# Patient Record
Sex: MALE | Race: WHITE | NOT HISPANIC OR LATINO | Employment: FULL TIME | URBAN - NONMETROPOLITAN AREA
[De-identification: names, ages, dates, MRNs, and addresses within clinical notes are randomized per-mention and may not be internally consistent; named-entity substitution may affect disease eponyms.]

---

## 2022-01-18 ENCOUNTER — OFFICE VISIT (OUTPATIENT)
Dept: FAMILY MEDICINE CLINIC | Facility: CLINIC | Age: 47
End: 2022-01-18

## 2022-01-18 VITALS
DIASTOLIC BLOOD PRESSURE: 75 MMHG | HEIGHT: 60 IN | OXYGEN SATURATION: 99 % | BODY MASS INDEX: 43.27 KG/M2 | RESPIRATION RATE: 18 BRPM | TEMPERATURE: 97.3 F | HEART RATE: 79 BPM | SYSTOLIC BLOOD PRESSURE: 119 MMHG | WEIGHT: 220.4 LBS

## 2022-01-18 DIAGNOSIS — Z00.00 ROUTINE GENERAL MEDICAL EXAMINATION AT A HEALTH CARE FACILITY: Primary | ICD-10-CM

## 2022-01-18 DIAGNOSIS — J45.30 MILD PERSISTENT REACTIVE AIRWAY DISEASE WITHOUT COMPLICATION: ICD-10-CM

## 2022-01-18 PROCEDURE — 99386 PREV VISIT NEW AGE 40-64: CPT | Performed by: FAMILY MEDICINE

## 2022-01-18 RX ORDER — ALBUTEROL SULFATE 90 UG/1
2 AEROSOL, METERED RESPIRATORY (INHALATION) EVERY 4 HOURS PRN
Qty: 18 G | Refills: 2 | Status: SHIPPED | OUTPATIENT
Start: 2022-01-18 | End: 2022-06-09

## 2022-01-18 RX ORDER — TRIAMCINOLONE ACETONIDE 1 MG/G
CREAM TOPICAL DAILY PRN
COMMUNITY
Start: 2021-12-23 | End: 2022-12-14

## 2022-01-18 RX ORDER — MONTELUKAST SODIUM 10 MG/1
10 TABLET ORAL NIGHTLY
Qty: 30 TABLET | Refills: 2 | Status: SHIPPED | OUTPATIENT
Start: 2022-01-18 | End: 2022-06-09

## 2022-01-18 NOTE — PATIENT INSTRUCTIONS
Preventive Care 21-39 Years Old, Male  Preventive care refers to lifestyle choices and visits with your health care provider that can promote health and wellness. This includes:  · A yearly physical exam. This is also called an annual wellness visit.  · Regular dental and eye exams.  · Immunizations.  · Screening for certain conditions.  · Healthy lifestyle choices, such as:  ? Eating a healthy diet.  ? Getting regular exercise.  ? Not using drugs or products that contain nicotine and tobacco.  ? Limiting alcohol use.  What can I expect for my preventive care visit?  Physical exam  Your health care provider may check your:  · Height and weight. These may be used to calculate your BMI (body mass index). BMI is a measurement that tells if you are at a healthy weight.  · Heart rate and blood pressure.  · Body temperature.  · Skin for abnormal spots.  Counseling  Your health care provider may ask you questions about your:  · Past medical problems.  · Family's medical history.  · Alcohol, tobacco, and drug use.  · Emotional well-being.  · Home life and relationship well-being.  · Sexual activity.  · Diet, exercise, and sleep habits.  · Work and work environment.  · Access to firearms.  What immunizations do I need?    Vaccines are usually given at various ages, according to a schedule. Your health care provider will recommend vaccines for you based on your age, medical history, and lifestyle or other factors, such as travel or where you work.  What tests do I need?  Blood tests  · Lipid and cholesterol levels. These may be checked every 5 years starting at age 20.  · Hepatitis C test.  · Hepatitis B test.  Screening    · Diabetes screening. This is done by checking your blood sugar (glucose) after you have not eaten for a while (fasting).  · Genital exam to check for testicular cancer or hernias.  · STD (sexually transmitted disease) testing, if you are at risk.  Talk with your health care provider about your test  results, treatment options, and if necessary, the need for more tests.  Follow these instructions at home:  Eating and drinking    · Eat a healthy diet that includes fresh fruits and vegetables, whole grains, lean protein, and low-fat dairy products.  · Drink enough fluid to keep your urine pale yellow.  · Take vitamin and mineral supplements as recommended by your health care provider.  · Do not drink alcohol if your health care provider tells you not to drink.  · If you drink alcohol:  ? Limit how much you have to 0-2 drinks a day.  ? Be aware of how much alcohol is in your drink. In the [place] one drink equals one 12 oz bottle of beer (355 mL), one 5 oz glass of wine (148 mL), or one 1½ oz glass of hard liquor (44 mL).    Lifestyle  · Take daily care of your teeth and gums. Brush your teeth every morning and night with fluoride toothpaste. Floss one time each day.  · Stay active. Exercise for at least 30 minutes 5 or more days each week.  · Do not use any products that contain nicotine or tobacco, such as cigarettes, e-cigarettes, and chewing tobacco. If you need help quitting, ask your health care provider.  · Do not use drugs.  · If you are sexually active, practice safe sex. Use a condom or other form of protection to prevent STIs (sexually transmitted infections).  · Find healthy ways to cope with stress, such as:  ? Meditation, yoga, or listening to music.  ? Journaling.  ? Talking to a trusted person.  ? Spending time with friends and family.  Safety  · Always wear your seat belt while driving or riding in a vehicle.  · Do not drive:  ? If you have been drinking alcohol. Do not ride with someone who has been drinking.  ? When you are tired or distracted.  ? While texting.  · Wear a helmet and other protective equipment during sports activities.  · If you have firearms in your house, make sure you follow all gun safety procedures.  · Seek help if you have been physically or sexually abused.  What's  next?  · Go to your health care provider once a year for an annual wellness visit.  · Ask your health care provider how often you should have your eyes and teeth checked.  · Stay up to date on all vaccines.  This information is not intended to replace advice given to you by your health care provider. Make sure you discuss any questions you have with your health care provider.  Document Revised: 09/02/2020 Document Reviewed: 12/12/2019  [employer] Patient Education © 2021 [employer]    Asthma, Adult    Asthma is a long-term (chronic) condition in which the airways get tight and narrow. The airways are the breathing passages that lead from the nose and mouth down into the lungs. A person with asthma will have times when symptoms get worse. These are called asthma attacks. They can cause coughing, whistling sounds when you breathe (wheezing), shortness of breath, and chest pain. They can make it hard to breathe. There is no cure for asthma, but medicines and lifestyle changes can help control it.  There are many things that can bring on an asthma attack or make asthma symptoms worse (triggers). Common triggers include:  · Mold.  · Dust.  · Cigarette smoke.  · Cockroaches.  · Things that can cause allergy symptoms (allergens). These include animal skin flakes (dander) and pollen from trees or grass.  · Things that pollute the air. These may include household , wood smoke, smog, or chemical odors.  · Cold air, weather changes, and wind.  · Crying or laughing hard.  · Stress.  · Certain medicines or drugs.  · Certain foods such as dried fruit, potato chips, and grape juice.  · Infections, such as a cold or the flu.  · Certain medical conditions or diseases.  · Exercise or tiring activities.  Asthma may be treated with medicines and by staying away from the things that cause asthma attacks. Types of medicines may include:  · Controller medicines. These help prevent asthma symptoms. They are usually taken every  day.  · Fast-acting reliever or rescue medicines. These quickly relieve asthma symptoms. They are used as needed and provide short-term relief.  · Allergy medicines if your attacks are brought on by allergens.  · Medicines to help control the body's defense (immune) system.  Follow these instructions at home:  Avoiding triggers in your home  · Change your heating and air conditioning filter often.  · Limit your use of fireplaces and wood stoves.  · Get rid of pests (such as roaches and mice) and their droppings.  · Throw away plants if you see mold on them.  · Clean your floors. Dust regularly. Use cleaning products that do not smell.  · Have someone vacuum when you are not home. Use a vacuum  with a HEPA filter if possible.  · Replace carpet with wood, tile, or vinyl mary alice. Carpet can trap animal skin flakes and dust.  · Use allergy-proof pillows, mattress covers, and box spring covers.  · Wash bed sheets and blankets every week in hot water. Dry them in a dryer.  · Keep your bedroom free of any triggers.  · Avoid pets and keep windows closed when things that cause allergy symptoms are in the air.  · Use blankets that are made of polyester or cotton.  · Clean bathrooms and viet with bleach. If possible, have someone repaint the walls in these rooms with mold-resistant paint. Keep out of the rooms that are being cleaned and painted.  · Wash your hands often with soap and water. If soap and water are not available, use hand .  · Do not allow anyone to smoke in your home.  General instructions  · Take over-the-counter and prescription medicines only as told by your doctor.  ? Talk with your doctor if you have questions about how or when to take your medicines.  ? Make note if you need to use your medicines more often than usual.  · Do not use any products that contain nicotine or tobacco, such as cigarettes and e-cigarettes. If you need help quitting, ask your doctor.  · Stay away from  secondhand smoke.  · Avoid doing things outdoors when allergen counts are high and when air quality is low.  · Wear a ski mask when doing outdoor activities in the winter. The mask should cover your nose and mouth. Exercise indoors on cold days if you can.  · Warm up before you exercise. Take time to cool down after exercise.  · Use a peak flow meter as told by your doctor. A peak flow meter is a tool that measures how well the lungs are working.  · Keep track of the peak flow meter's readings. Write them down.  · Follow your asthma action plan. This is a written plan for taking care of your asthma and treating your attacks.  · Make sure you get all the shots (vaccines) that your doctor recommends. Ask your doctor about a flu shot and a pneumonia shot.  · Keep all follow-up visits as told by your doctor. This is important.  Contact a doctor if:  · You have wheezing, shortness of breath, or a cough even while taking medicine to prevent attacks.  · The mucus you cough up (sputum) is thicker than usual.  · The mucus you cough up changes from clear or white to yellow, green, gray, or bloody.  · You have problems from the medicine you are taking, such as:  ? A rash.  ? Itching.  ? Swelling.  ? Trouble breathing.  · You need reliever medicines more than 2-3 times a week.  · Your peak flow reading is still at 50-79% of your personal best after following the action plan for 1 hour.  · You have a fever.  Get help right away if:  · You seem to be worse and are not responding to medicine during an asthma attack.  · You are short of breath even at rest.  · You get short of breath when doing very little activity.  · You have trouble eating, drinking, or talking.  · You have chest pain or tightness.  · You have a fast heartbeat.  · Your lips or fingernails start to turn blue.  · You are light-headed or dizzy, or you faint.  · Your peak flow is less than 50% of your personal best.  · You feel too tired to breathe  normally.  Summary  · Asthma is a long-term (chronic) condition in which the airways get tight and narrow. An asthma attack can make it hard to breathe.  · Asthma cannot be cured, but medicines and lifestyle changes can help control it.  · Make sure you understand how to avoid triggers and how and when to use your medicines.  This information is not intended to replace advice given to you by your health care provider. Make sure you discuss any questions you have with your health care provider.  Document Revised: 04/21/2021 Document Reviewed: 04/21/2021  [employer] Patient Education © 2021 [employer]

## 2022-01-18 NOTE — PROGRESS NOTES
"       New Patient History and Physical      Referring Physician: No ref. provider found    Chief Complaint:    Chief Complaint   Patient presents with   • Establish Care   • Annual Exam     William Maria is a 46-year-old male who is here today for an establishment with a new primary care provider, as well as routine annual/preventative healthcare needs.     The patient has consented to being recorded using [facility].    History of Present Illness:   The patient reports he has not seen a doctor since Dr. Tavarez who retired from [facility]. He states he has had a nagging cough since 05/2021 which comes and goes. The patient thinks its allergy related.  He reports to still running and training.    He states summer 2021 he had a eczema outbreak in which he saw Dr. Ayala at [facility] in [place]. The patient states from summer 2021 to September it was under control, but since then his skin has been real irritated. He has been using some topical medication as needed which helps. The patient reports occasionally his skin has what looks like \"chill bumps\" that gets reactive and when he goes to sleep he scratches them. He reports to having an allergic component but it unable to remember. The patient states he is not a huge dairy consumer. He reports he does not have a lot sugars to feed anything. The patient has been taking Zyrtec for 4 weeks which has helped.     The patient reports to having a health screening yearly.  He states he has lower cholesterol. The patient reports he has started eating real butter and staying away from vegetable oils. He denies any trouble with urination or bowel movements.      Subjective     Review of Systems     1. Constitutional: Negative for fever. Negative for chills, diaphoresis, fatigue and unexpected weight change.   2. HENT: No dysphagia; no changes to vision/hearing/smell/taste; no epistaxis  3. Eyes: Negative for redness and visual disturbance.   4. Respiratory: As per " "above.  5. Cardiovascular: Negative for chest pain and palpitations.   6. Gastrointestinal: Negative for abdominal distention, abdominal pain and blood in stool.   7. Endocrine: Negative for cold intolerance and heat intolerance.   8. Genitourinary: Negative for difficulty urinating, dysuria and frequency.   9. Musculoskeletal: Negative for arthralgias, back pain and myalgias.   10. Skin: As per above.  11. Neurological: Negative for syncope, weakness and headaches.   12. Hematological: Negative for adenopathy. Does not bruise/bleed easily.   13. Psychiatric/Behavioral: Negative for confusion. The patient is not nervous/anxious.     The following portions of the patient's history were reviewed and updated as appropriate: allergies, current medications, past family history, past medical history, past social history, past surgical history and problem list.    Past Medical History: History reviewed. No pertinent past medical history.    Past Surgical History:History reviewed. No pertinent surgical history.    Family History: family history includes No Known Problems in his father and mother.    Social History:  reports that he has never smoked. He has never used smokeless tobacco. He reports that he does not drink alcohol and does not use drugs.    Medications:    Current Outpatient Medications:   •  triamcinolone (KENALOG) 0.1 % cream, APPLY TO AFFECTED AREA TWO TIMES A DAY X2 WEEKS THEN JUST AS NEEDED FOR FLARES, Disp: , Rfl:   •  albuterol sulfate  (90 Base) MCG/ACT inhaler, Inhale 2 puffs Every 4 (Four) Hours As Needed for Wheezing or Shortness of Air., Disp: 18 g, Rfl: 2  •  montelukast (Singulair) 10 MG tablet, Take 1 tablet by mouth Every Night., Disp: 30 tablet, Rfl: 2    Allergies:  No Known Allergies    Objective     Physical Exam:  Vital Signs: /75   Pulse 79   Temp 97.3 °F (36.3 °C)   Resp 18   Ht (!) 15.7 cm (6.2\")   Wt 100 kg (220 lb 6.4 oz)   SpO2 99%   BMI 4031.17 kg/m²      General " Appearance: alert, oriented x 3, no acute distress.  Skin: warm and dry.  Areas of fine maculopapular rash to the upper extremities, consistent with patchy areas that demonstrate qualities of eczema.  Occasional areas of excoriation noted.  HEENT: Atraumatic.  pupils round and reactive to light and accommodation, oral mucosa pink and moist.  Nares patent without epistaxis.  External auditory canals are patent tympanic membranes intact.  Neck: supple, no JVD, trachea midline.  No thyromegaly  Lungs: CTA, unlabored breathing effort.  Heart: RRR, normal S1 and S2, no S3, no rub.  Abdomen: soft, non-tender, no palpable bladder, present bowel sounds to auscultation ×4.  No guarding or rigidity.  Extremities: no clubbing, cyanosis or edema.  Good range of motion actively and passively.  Symmetric muscle strength and development  Neuro: normal speech and mental status.  Cranial nerves II through XII intact.  No anosmia. DTR 2+; proprioception intact.  No focal motor/sensory deficits.    Advance Care Planning   ACP discussion was held with the patient during this visit. Patient does not have an advance directive, information provided.    Assessment / Plan     Assessment/Plan:   Diagnoses and all orders for this visit:    1. Routine general medical examination at a health care facility (Primary)    2. Mild persistent reactive airway disease without complication  -     montelukast (Singulair) 10 MG tablet; Take 1 tablet by mouth Every Night.  Dispense: 30 tablet; Refill: 2  -     albuterol sulfate  (90 Base) MCG/ACT inhaler; Inhale 2 puffs Every 4 (Four) Hours As Needed for Wheezing or Shortness of Air.  Dispense: 18 g; Refill: 2      I have recommended trial of once daily montelukast.  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  Also dealing with mild persistent reactive airway disease.  I am hopeful that montelukast will also aid in controlling the symptoms as well.    He is  provided a short acting bronchodilator for as needed use.  He verbalized understanding of the correct timing of this medication.  Should he develop any ill effects he is asked to notify the office immediately with either of the 2 new medications.    Vital signs demonstrate hemodynamic stability.    Discussed need for reduction in sodium/salt/caffeine intake; improve sleep habits as able; inc formal CV exercise program with goal of vigorous activity most, if not all, days of the week; goal of 250 min of sustained HR CV exercise total per week.    I have discussed age/gender specific preventative healthcare issues in detail with patient today.  I have answered all of the questions.      Discussion Summary:    I spent 30 minutes caring for William on this date of service. This time includes time spent by me in the following activities:preparing for the visit, performing a medically appropriate examination and/or evaluation , counseling and educating the patient/family/caregiver, ordering medications, tests, or procedures, documenting information in the medical record and care coordination    Discussed plan of care in detail with pt today; pt verb understanding and agrees.  Follow up:  No follow-ups on file.     Patient Instructions       Preventive Care 21-39 Years Old, Male  Preventive care refers to lifestyle choices and visits with your health care provider that can promote health and wellness. This includes:  · A yearly physical exam. This is also called an annual wellness visit.  · Regular dental and eye exams.  · Immunizations.  · Screening for certain conditions.  · Healthy lifestyle choices, such as:  ? Eating a healthy diet.  ? Getting regular exercise.  ? Not using drugs or products that contain nicotine and tobacco.  ? Limiting alcohol use.  What can I expect for my preventive care visit?  Physical exam  Your health care provider may check your:  · Height and weight. These may be used to calculate your BMI  (body mass index). BMI is a measurement that tells if you are at a healthy weight.  · Heart rate and blood pressure.  · Body temperature.  · Skin for abnormal spots.  Counseling  Your health care provider may ask you questions about your:  · Past medical problems.  · Family's medical history.  · Alcohol, tobacco, and drug use.  · Emotional well-being.  · Home life and relationship well-being.  · Sexual activity.  · Diet, exercise, and sleep habits.  · Work and work environment.  · Access to firearms.  What immunizations do I need?    Vaccines are usually given at various ages, according to a schedule. Your health care provider will recommend vaccines for you based on your age, medical history, and lifestyle or other factors, such as travel or where you work.  What tests do I need?  Blood tests  · Lipid and cholesterol levels. These may be checked every 5 years starting at age 20.  · Hepatitis C test.  · Hepatitis B test.  Screening    · Diabetes screening. This is done by checking your blood sugar (glucose) after you have not eaten for a while (fasting).  · Genital exam to check for testicular cancer or hernias.  · STD (sexually transmitted disease) testing, if you are at risk.  Talk with your health care provider about your test results, treatment options, and if necessary, the need for more tests.  Follow these instructions at home:  Eating and drinking    · Eat a healthy diet that includes fresh fruits and vegetables, whole grains, lean protein, and low-fat dairy products.  · Drink enough fluid to keep your urine pale yellow.  · Take vitamin and mineral supplements as recommended by your health care provider.  · Do not drink alcohol if your health care provider tells you not to drink.  · If you drink alcohol:  ? Limit how much you have to 0-2 drinks a day.  ? Be aware of how much alcohol is in your drink. In the [place] one drink equals one 12 oz bottle of beer (355 mL), one 5 oz glass of wine (148 mL), or one 1½  oz glass of hard liquor (44 mL).    Lifestyle  · Take daily care of your teeth and gums. Brush your teeth every morning and night with fluoride toothpaste. Floss one time each day.  · Stay active. Exercise for at least 30 minutes 5 or more days each week.  · Do not use any products that contain nicotine or tobacco, such as cigarettes, e-cigarettes, and chewing tobacco. If you need help quitting, ask your health care provider.  · Do not use drugs.  · If you are sexually active, practice safe sex. Use a condom or other form of protection to prevent STIs (sexually transmitted infections).  · Find healthy ways to cope with stress, such as:  ? Meditation, yoga, or listening to music.  ? Journaling.  ? Talking to a trusted person.  ? Spending time with friends and family.  Safety  · Always wear your seat belt while driving or riding in a vehicle.  · Do not drive:  ? If you have been drinking alcohol. Do not ride with someone who has been drinking.  ? When you are tired or distracted.  ? While texting.  · Wear a helmet and other protective equipment during sports activities.  · If you have firearms in your house, make sure you follow all gun safety procedures.  · Seek help if you have been physically or sexually abused.  What's next?  · Go to your health care provider once a year for an annual wellness visit.  · Ask your health care provider how often you should have your eyes and teeth checked.  · Stay up to date on all vaccines.  This information is not intended to replace advice given to you by your health care provider. Make sure you discuss any questions you have with your health care provider.  Document Revised: 09/02/2020 Document Reviewed: 12/12/2019  [email or web address] Patient Education © 2021 [employer]        Alex Flores DO  01/18/22  16:28 EST      Transcribed from ambient dictation for Alex Flores DO by Shayy Sultana.  01/18/22   18:20 EST    Patient verbalized consent to the visit recording.  I have  personally performed the services described in this document as transcribed by the above individual, and it is both accurate and complete.  Alex Flores, DO  1/18/2022  18:44 EST      Please note that portions of this note may have been completed with a voice recognition program. Efforts were made to edit the dictations, but occasionally words are mistranscribed.

## 2022-06-02 ENCOUNTER — OFFICE VISIT (OUTPATIENT)
Dept: FAMILY MEDICINE CLINIC | Facility: CLINIC | Age: 47
End: 2022-06-02

## 2022-06-02 VITALS
HEART RATE: 76 BPM | SYSTOLIC BLOOD PRESSURE: 120 MMHG | OXYGEN SATURATION: 96 % | WEIGHT: 224 LBS | RESPIRATION RATE: 15 BRPM | HEIGHT: 74 IN | BODY MASS INDEX: 28.75 KG/M2 | TEMPERATURE: 97.8 F | DIASTOLIC BLOOD PRESSURE: 72 MMHG

## 2022-06-02 DIAGNOSIS — D50.9 MICROCYTIC ANEMIA: Primary | ICD-10-CM

## 2022-06-02 DIAGNOSIS — D75.839 THROMBOCYTOSIS: ICD-10-CM

## 2022-06-02 DIAGNOSIS — D50.9 IRON DEFICIENCY ANEMIA, UNSPECIFIED IRON DEFICIENCY ANEMIA TYPE: ICD-10-CM

## 2022-06-02 PROCEDURE — 99214 OFFICE O/P EST MOD 30 MIN: CPT | Performed by: FAMILY MEDICINE

## 2022-06-03 LAB
ALBUMIN SERPL-MCNC: 3.2 G/DL (ref 3.5–5.2)
ALBUMIN/GLOB SERPL: 0.8 G/DL
ALP SERPL-CCNC: 79 U/L (ref 39–117)
ALT SERPL-CCNC: 16 U/L (ref 1–41)
ANA SER QL: NEGATIVE
AST SERPL-CCNC: 18 U/L (ref 1–40)
BILIRUB SERPL-MCNC: 0.3 MG/DL (ref 0–1.2)
BUN SERPL-MCNC: 16 MG/DL (ref 6–20)
BUN/CREAT SERPL: 18.2 (ref 7–25)
CALCIUM SERPL-MCNC: 9.1 MG/DL (ref 8.6–10.5)
CHLORIDE SERPL-SCNC: 99 MMOL/L (ref 98–107)
CO2 SERPL-SCNC: 25.5 MMOL/L (ref 22–29)
CREAT SERPL-MCNC: 0.88 MG/DL (ref 0.76–1.27)
CRP SERPL-MCNC: 13.43 MG/DL (ref 0–0.5)
EGFRCR SERPLBLD CKD-EPI 2021: 108.7 ML/MIN/1.73
GLOBULIN SER CALC-MCNC: 4.1 GM/DL
GLUCOSE SERPL-MCNC: 85 MG/DL (ref 65–99)
POTASSIUM SERPL-SCNC: 4.2 MMOL/L (ref 3.5–5.2)
PROT SERPL-MCNC: 7.3 G/DL (ref 6–8.5)
SODIUM SERPL-SCNC: 141 MMOL/L (ref 136–145)

## 2022-06-09 ENCOUNTER — OFFICE VISIT (OUTPATIENT)
Dept: GASTROENTEROLOGY | Facility: CLINIC | Age: 47
End: 2022-06-09

## 2022-06-09 VITALS
WEIGHT: 207.6 LBS | BODY MASS INDEX: 26.64 KG/M2 | TEMPERATURE: 97.7 F | DIASTOLIC BLOOD PRESSURE: 62 MMHG | HEIGHT: 74 IN | SYSTOLIC BLOOD PRESSURE: 116 MMHG

## 2022-06-09 DIAGNOSIS — Z01.812 PRE-PROCEDURE LAB EXAM: Primary | ICD-10-CM

## 2022-06-09 DIAGNOSIS — R63.4 WEIGHT LOSS: Chronic | ICD-10-CM

## 2022-06-09 DIAGNOSIS — D64.9 ANEMIA, UNSPECIFIED TYPE: Primary | Chronic | ICD-10-CM

## 2022-06-09 PROCEDURE — 99203 OFFICE O/P NEW LOW 30 MIN: CPT | Performed by: NURSE PRACTITIONER

## 2022-06-09 RX ORDER — POLYETHYLENE GLYCOL 3350, SODIUM SULFATE, SODIUM CHLORIDE, POTASSIUM CHLORIDE, ASCORBIC ACID, SODIUM ASCORBATE 140-9-5.2G
1 KIT ORAL TAKE AS DIRECTED
Qty: 1 EACH | Refills: 0 | Status: SHIPPED | OUTPATIENT
Start: 2022-06-09 | End: 2022-06-24 | Stop reason: HOSPADM

## 2022-06-09 RX ORDER — SODIUM CHLORIDE 9 MG/ML
70 INJECTION, SOLUTION INTRAVENOUS CONTINUOUS PRN
Status: CANCELLED | OUTPATIENT
Start: 2022-06-09

## 2022-06-09 NOTE — PROGRESS NOTES
New Patient Consult      Date: 2022   Patient Name: William Maria  MRN: [id number]  : 1975     Primary Care Provider: Alex Flores DO    Chief Complaint   Patient presents with   • Consult   • Anemia     History of Present Illness: William Maria is a 46 y.o. male who is here today to establish care with gastroenterology for anemia.    The patient was told to have anemia about 3 months ago and had low iron levels. Unfortunately, we do not have any lab results. He had labs drawn at [facility] in [place] off [address]. The patient denies GI bleeding. There is no history of hematuria. No nosebleeds. He is not vegetarian. He is not taking iron supplements. He has lost about 40 pounds in the past 10 months that started out intentional, but then he continued to lose weight after he stopped trying.      The patient denies recent change in bowel habits. There is no diarrhea or constipation. There is no history of abdominal pain. There is no history of overt GI bleed (hematemesis melena or hematochezia). The patient denies nausea or vomiting. There is no history of reflux. The patient denies dysphagia or odynophagia. There is no history of liver disease in the past. There is no family history of GI malignancy. The patient has not had a colonoscopy in the past.    Subjective      Past Medical History:   Diagnosis Date   • Anemia      History reviewed. No pertinent surgical history.  Family History   Problem Relation Age of Onset   • No Known Problems Mother    • No Known Problems Father    • Diverticulitis Maternal Grandmother    • Colon cancer Neg Hx      Social History     Socioeconomic History   • Marital status:    Tobacco Use   • Smoking status: Never Smoker   • Smokeless tobacco: Never Used   Vaping Use   • Vaping Use: Never used   Substance and Sexual Activity   • Alcohol use: Never   • Drug use: Never   • Sexual activity: Defer       Current Outpatient Medications:   •   "PEG-KCl-NaCl-NaSulf-Na Asc-C (Plenvu) 140 g reconstituted solution solution, Take 140 g by mouth Take As Directed. Take 1 kit orally as directed for colonoscopy prep., Disp: 1 each, Rfl: 0  •  triamcinolone (KENALOG) 0.1 % cream, APPLY TO AFFECTED AREA TWO TIMES A DAY X2 WEEKS THEN JUST AS NEEDED FOR FLARES, Disp: , Rfl:      No Known Allergies     The following portions of the patient's history were reviewed and updated as appropriate: allergies, current medications, past family history, past medical history, past social history, past surgical history and problem list.    Objective     Physical Exam  Vitals and nursing note reviewed.   Constitutional:       General: He is not in acute distress.     Appearance: Normal appearance. He is well-developed.   HENT:      Head: Normocephalic and atraumatic.      Mouth/Throat:      Mouth: Mucous membranes are not pale, not dry and not cyanotic.   Eyes:      General: Lids are normal.   Neck:      Trachea: Trachea normal.   Cardiovascular:      Rate and Rhythm: Normal rate.   Pulmonary:      Effort: Pulmonary effort is normal. No respiratory distress.      Breath sounds: Normal breath sounds.   Abdominal:      General: Bowel sounds are normal.      Palpations: Abdomen is soft. There is no mass.      Tenderness: There is no abdominal tenderness.      Hernia: No hernia is present.   Skin:     General: Skin is warm and dry.   Neurological:      Mental Status: He is alert and oriented to person, place, and time.   Psychiatric:         Mood and Affect: Mood normal.         Speech: Speech normal.         Behavior: Behavior normal. Behavior is cooperative.       Vitals:    06/09/22 0854   BP: 116/62   Temp: 97.7 °F (36.5 °C)   TempSrc: Infrared   Weight: 94.2 kg (207 lb 9.6 oz)   Height: 188 cm (74\")     Body mass index is 26.65 kg/m².     Results Review:   I have reviewed the patient's new clinical and imaging results.    Office Visit on 06/02/2022   Component Date Value Ref Range " Status   • C-Reactive Protein 06/02/2022 13.43 (A) 0.00 - 0.50 mg/dL Final   • ETHEL Direct 06/02/2022 Negative  Negative Final   • Glucose 06/02/2022 85  65 - 99 mg/dL Final   • BUN 06/02/2022 16  6 - 20 mg/dL Final   • Creatinine 06/02/2022 0.88  0.76 - 1.27 mg/dL Final   • EGFR Result 06/02/2022 108.7  >60.0 mL/min/1.73 Final    Comment: National Kidney Foundation and American Society of  Nephrology (ASN) Task Force recommended calculation based  on the Chronic Kidney Disease Epidemiology Collaboration  (CKD-EPI) equation refit without adjustment for race.  GFR Normal >60  Chronic Kidney Disease <60  Kidney Failure <15     • BUN/Creatinine Ratio 06/02/2022 18.2  7.0 - 25.0 Final   • Sodium 06/02/2022 141  136 - 145 mmol/L Final   • Potassium 06/02/2022 4.2  3.5 - 5.2 mmol/L Final   • Chloride 06/02/2022 99  98 - 107 mmol/L Final   • Total CO2 06/02/2022 25.5  22.0 - 29.0 mmol/L Final   • Calcium 06/02/2022 9.1  8.6 - 10.5 mg/dL Final   • Total Protein 06/02/2022 7.3  6.0 - 8.5 g/dL Final   • Albumin 06/02/2022 3.20 (A) 3.50 - 5.20 g/dL Final   • Globulin 06/02/2022 4.1  gm/dL Final   • A/G Ratio 06/02/2022 0.8  g/dL Final   • Total Bilirubin 06/02/2022 0.3  0.0 - 1.2 mg/dL Final   • Alkaline Phosphatase 06/02/2022 79  39 - 117 U/L Final   • AST (SGOT) 06/02/2022 18  1 - 40 U/L Final   • ALT (SGPT) 06/02/2022 16  1 - 41 U/L Final      Dated 7/27/2021 TSH 1.98, vitamin D 28    No radiology results for the last 90 days.     Assessment / Plan      1. Anemia, unspecified type  2. Weight loss  He was told to have anemia with low iron levels about 3 months ago. Unfortunately, we do not have lab results. Denies GI bleeding. No history of hematuria or nosebleeds. He is not vegetarian. He has lost about 40 pounds in the past 10 months that started out intentional, bur he continued to lose weight after he stopped trying. He has not had colonoscopy or EGD in the past. There is no family history of GI malignancy. Labs that are  available show normal liver enzymes. CRP elevated at 13.43, which is nonspecific.  Will obtain copies of labs from [facility].   Colonoscopy and EGD to rule out GI source of anemia.    - PEG-KCl-NaCl-NaSulf-Na Asc-C (Plenvu) 140 g reconstituted solution solution; Take 140 g by mouth Take As Directed. Take 1 kit orally as directed for colonoscopy prep.  Dispense: 1 each; Refill: 0  - Case Request; Standing  - Case Request    Patient Instructions   1. Obtain copies of labs from [facility].   2. Upper endoscopy-EGD: The indications, technique, alternatives and potential risk and complications were discussed with the patient including but not limited to bleeding, perforations, missing lesions and anesthetic complications. The patient understands and wishes to proceed with the procedure and has given their verbal consent. Written patient education information was given to the patient.   3. COVID-19 testing prior to procedure. The patient will need to self-quarantine after testing until the procedure. Instructions given to patient.   4. Colonoscopy: The indications, technique, alternatives and potential risk and complications were discussed with the patient including but not limited to bleeding, perforations, missing lesions and anesthetic complications. The patient understands and wishes to proceed with the procedure and has given their verbal consent. Written patient education information was given to the patient.   5. The patient will call if they have further questions before procedure.       Luke Randolph, APRN  6/9/2022    Please note that portions of this note may have been completed with a voice recognition program. Efforts were made to edit the dictations, but occasionally words are mistranscribed.

## 2022-06-09 NOTE — PATIENT INSTRUCTIONS
Obtain copies of labs from [facility].   Upper endoscopy-EGD: The indications, technique, alternatives and potential risk and complications were discussed with the patient including but not limited to bleeding, perforations, missing lesions and anesthetic complications. The patient understands and wishes to proceed with the procedure and has given their verbal consent. Written patient education information was given to the patient.   COVID-19 testing prior to procedure. The patient will need to self-quarantine after testing until the procedure. Instructions given to patient.   Colonoscopy: The indications, technique, alternatives and potential risk and complications were discussed with the patient including but not limited to bleeding, perforations, missing lesions and anesthetic complications. The patient understands and wishes to proceed with the procedure and has given their verbal consent. Written patient education information was given to the patient.   The patient will call if they have further questions before procedure.

## 2022-06-17 ENCOUNTER — HOSPITAL ENCOUNTER (OUTPATIENT)
Dept: ULTRASOUND IMAGING | Facility: HOSPITAL | Age: 47
Discharge: HOME | End: 2022-06-17
Admitting: FAMILY MEDICINE

## 2022-06-17 ENCOUNTER — TELEPHONE (OUTPATIENT)
Dept: GASTROENTEROLOGY | Facility: CLINIC | Age: 47
End: 2022-06-17

## 2022-06-17 PROCEDURE — 76700 US EXAM ABDOM COMPLETE: CPT

## 2022-06-17 NOTE — TELEPHONE ENCOUNTER
Called patient to confirm colonoscopy and upper endoscopy for 06/24/22. Reached voicemail. Left detailed message for return call.

## 2022-06-23 ENCOUNTER — LAB (OUTPATIENT)
Dept: LAB | Facility: HOSPITAL | Age: 47
End: 2022-06-23

## 2022-06-23 DIAGNOSIS — Z01.812 PRE-PROCEDURE LAB EXAM: ICD-10-CM

## 2022-06-23 LAB — SARS-COV-2 RNA PNL SPEC NAA+PROBE: NOT DETECTED

## 2022-06-23 PROCEDURE — 87635 SARS-COV-2 COVID-19 AMP PRB: CPT

## 2022-06-23 NOTE — PRE-PROCEDURE INSTRUCTIONS
PAT phone history completed with pt for upcoming procedure on 6/24/22, with Dr. Steinberg.    PAT PASS GIVEN/REVIEWED WITH PT.  VERBALIZED UNDERSTANDING OF THE FOLLOWING:  DO NOT EAT, DRINK, SMOKE, USE SMOKELESS TOBACCO OR CHEW GUM AFTER MIDNIGHT THE NIGHT BEFORE SURGERY.  THIS ALSO INCLUDES HARD CANDIES AND MINTS.    DO NOT SHAVE THE AREA TO BE OPERATED ON AT LEAST 48 HOURS PRIOR TO THE PROCEDURE.  DO NOT WEAR MAKE UP OR NAIL POLISH.  DO NOT LEAVE IN ANY PIERCING OR WEAR JEWELRY THE DAY OF SURGERY.      DO NOT USE ADHESIVES IF YOU WEAR DENTURES.    DO NOT WEAR EYE CONTACTS; BRING IN YOUR GLASSES.    ONLY TAKE MEDICATION THE MORNING OF YOUR PROCEDURE IF INSTRUCTED BY YOUR SURGEON WITH ENOUGH WATER TO SWALLOW THE MEDICATION.  IF YOUR SURGEON DID NOT SPECIFY WHICH MEDICATIONS TO TAKE, YOU WILL NEED TO CALL THEIR OFFICE FOR FURTHER INSTRUCTIONS AND DO AS THEY INSTRUCT.    LEAVE ANYTHING YOU CONSIDER VALUABLE AT HOME.    YOU WILL NEED TO ARRANGE FOR SOMEONE TO DRIVE YOU HOME AFTER SURGERY.  IT IS RECOMMENDED THAT YOU DO NOT DRIVE, WORK, DRINK ALCOHOL OR MAKE MAJOR DECISIONS FOR AT LEAST 24 HOURS AFTER YOUR PROCEDURE IS COMPLETE.      THE DAY OF YOUR PROCEDURE, BRING IN THE FOLLOWING IF APPLICABLE:   PICTURE ID AND INSURANCE/MEDICARE OR MEDICAID CARDS/ANY CO-PAY THAT MAY BE DUE   COPY OF ADVANCED DIRECTIVE/LIVING WILL/POWER OR    CPAP/BIPAP/INHALERS   SKIN PREP SHEET   YOUR PREADMISSION TESTING PASS (IF NOT A PHONE HISTORY)           COVID self-quarantine instructions reviewed with the pt.  Verbalized understanding.

## 2022-06-24 ENCOUNTER — ANESTHESIA (OUTPATIENT)
Dept: GASTROENTEROLOGY | Facility: HOSPITAL | Age: 47
End: 2022-06-24

## 2022-06-24 ENCOUNTER — HOSPITAL ENCOUNTER (OUTPATIENT)
Facility: HOSPITAL | Age: 47
Setting detail: HOSPITAL OUTPATIENT SURGERY
Discharge: HOME | End: 2022-06-24
Attending: INTERNAL MEDICINE | Admitting: INTERNAL MEDICINE

## 2022-06-24 ENCOUNTER — ANESTHESIA EVENT (OUTPATIENT)
Dept: GASTROENTEROLOGY | Facility: HOSPITAL | Age: 47
End: 2022-06-24

## 2022-06-24 VITALS
SYSTOLIC BLOOD PRESSURE: 140 MMHG | TEMPERATURE: 97.6 F | RESPIRATION RATE: 20 BRPM | OXYGEN SATURATION: 99 % | BODY MASS INDEX: 25.93 KG/M2 | HEIGHT: 74 IN | HEART RATE: 91 BPM | DIASTOLIC BLOOD PRESSURE: 89 MMHG | WEIGHT: 202 LBS

## 2022-06-24 DIAGNOSIS — R63.4 WEIGHT LOSS: ICD-10-CM

## 2022-06-24 DIAGNOSIS — D50.9 IRON DEFICIENCY ANEMIA, UNSPECIFIED IRON DEFICIENCY ANEMIA TYPE: Primary | ICD-10-CM

## 2022-06-24 DIAGNOSIS — D64.9 ANEMIA, UNSPECIFIED TYPE: ICD-10-CM

## 2022-06-24 PROCEDURE — 25010000002 PROPOFOL 200 MG/20ML EMULSION: Performed by: NURSE ANESTHETIST, CERTIFIED REGISTERED

## 2022-06-24 PROCEDURE — 45385 COLONOSCOPY W/LESION REMOVAL: CPT | Performed by: INTERNAL MEDICINE

## 2022-06-24 PROCEDURE — 43239 EGD BIOPSY SINGLE/MULTIPLE: CPT | Performed by: INTERNAL MEDICINE

## 2022-06-24 PROCEDURE — 45380 COLONOSCOPY AND BIOPSY: CPT | Performed by: INTERNAL MEDICINE

## 2022-06-24 RX ORDER — KETAMINE HYDROCHLORIDE 50 MG/ML
INJECTION, SOLUTION, CONCENTRATE INTRAMUSCULAR; INTRAVENOUS AS NEEDED
Status: DISCONTINUED | OUTPATIENT
Start: 2022-06-24 | End: 2022-06-24 | Stop reason: SURG

## 2022-06-24 RX ORDER — PROPOFOL 10 MG/ML
INJECTION, EMULSION INTRAVENOUS AS NEEDED
Status: DISCONTINUED | OUTPATIENT
Start: 2022-06-24 | End: 2022-06-24 | Stop reason: SURG

## 2022-06-24 RX ORDER — SODIUM CHLORIDE 9 MG/ML
70 INJECTION, SOLUTION INTRAVENOUS CONTINUOUS PRN
Status: DISCONTINUED | OUTPATIENT
Start: 2022-06-24 | End: 2022-06-24 | Stop reason: HOSPADM

## 2022-06-24 RX ORDER — PROPOFOL 10 MG/ML
INJECTION, EMULSION INTRAVENOUS AS NEEDED
Status: DISCONTINUED | OUTPATIENT
Start: 2022-06-24 | End: 2022-06-24

## 2022-06-24 RX ORDER — SODIUM CHLORIDE 9 MG/ML
INJECTION, SOLUTION INTRAVENOUS CONTINUOUS PRN
Status: DISCONTINUED | OUTPATIENT
Start: 2022-06-24 | End: 2022-06-24 | Stop reason: SURG

## 2022-06-24 RX ORDER — PANTOPRAZOLE SODIUM 40 MG/1
40 TABLET, DELAYED RELEASE ORAL DAILY
Qty: 30 TABLET | Refills: 2 | Status: SHIPPED | OUTPATIENT
Start: 2022-06-24 | End: 2022-07-12

## 2022-06-24 RX ORDER — SIMETHICONE 20 MG/.3ML
EMULSION ORAL AS NEEDED
Status: DISCONTINUED | OUTPATIENT
Start: 2022-06-24 | End: 2022-06-24 | Stop reason: HOSPADM

## 2022-06-24 RX ADMIN — PROPOFOL 100 MG: 10 INJECTION, EMULSION INTRAVENOUS at 14:35

## 2022-06-24 RX ADMIN — PROPOFOL 50 MG: 10 INJECTION, EMULSION INTRAVENOUS at 14:50

## 2022-06-24 RX ADMIN — PROPOFOL 100 MG: 10 INJECTION, EMULSION INTRAVENOUS at 14:44

## 2022-06-24 RX ADMIN — SODIUM CHLORIDE: 9 INJECTION, SOLUTION INTRAVENOUS at 14:12

## 2022-06-24 RX ADMIN — PROPOFOL 100 MG: 10 INJECTION, EMULSION INTRAVENOUS at 14:29

## 2022-06-24 RX ADMIN — SODIUM CHLORIDE 70 ML/HR: 9 INJECTION, SOLUTION INTRAVENOUS at 12:01

## 2022-06-24 RX ADMIN — PROPOFOL 100 MG: 10 INJECTION, EMULSION INTRAVENOUS at 14:20

## 2022-06-24 RX ADMIN — KETAMINE HYDROCHLORIDE 50 MG: 50 INJECTION, SOLUTION INTRAMUSCULAR; INTRAVENOUS at 14:20

## 2022-06-24 NOTE — H&P
[facility]  HISTORY AND PHYSICAL    Patient Name: William Maria  : 1975  MRN: [id number]    Chief Complaint:   For EGD/ colonoscopy    History Of Presenting Illness:    Iron deficiency anemia  Wt loss    Past Medical History:   Diagnosis Date   • Anemia        History reviewed. No pertinent surgical history.    Social History     Socioeconomic History   • Marital status:    Tobacco Use   • Smoking status: Never Smoker   • Smokeless tobacco: Never Used   Vaping Use   • Vaping Use: Never used   Substance and Sexual Activity   • Alcohol use: Never   • Drug use: Never   • Sexual activity: Defer       Family History   Problem Relation Age of Onset   • No Known Problems Mother    • No Known Problems Father    • Diverticulitis Maternal Grandmother    • Colon cancer Neg Hx        Prior to Admission Medications:  Medications Prior to Admission   Medication Sig Dispense Refill Last Dose   • PEG-KCl-NaCl-NaSulf-Na Asc-C (Plenvu) 140 g reconstituted solution solution Take 140 g by mouth Take As Directed. Take 1 kit orally as directed for colonoscopy prep. 1 each 0 2022 at 0600   • triamcinolone (KENALOG) 0.1 % cream Daily As Needed.          Allergies:  No Known Allergies     Vitals: Temp:  [97.8 °F (36.6 °C)] 97.8 °F (36.6 °C)  Heart Rate:  [86] 86  Resp:  [16] 16  BP: (131)/(74) 131/74    Review Of Systems:  Constitutional:  Negative for chills, fever, and unexpected weight change.  Respiratory:  Negative for cough, chest tightness, shortness of breath, and wheezing.  Cardiovascular:  Negative for chest pain, palpitations, and leg swelling.  Gastrointestinal:  Negative for abdominal distention, abdominal pain, Nausea, vomiting.  Neurological:  Negative for Weakness, numbness, and headaches.     Physical Exam:    General Appearance:  Alert, cooperative, in no acute distress.   Lungs:   Clear to auscultation, respirations regular, even and                 unlabored.   Heart:  Regular rhythm  and normal rate.   Abdomen:   Normal bowel sounds, no masses, no organomegaly. Soft, non-tender, non-distended   Neurologic: Alert and oriented x 3. Moves all four limbs equally       Plan: ESOPHAGOGASTRODUODENOSCOPY (N/A), COLONOSCOPY (N/A)     Lc Steinberg MD  6/24/2022

## 2022-06-24 NOTE — ANESTHESIA PREPROCEDURE EVALUATION
Anesthesia Evaluation     Patient summary reviewed and Nursing notes reviewed   no history of anesthetic complications:  NPO Solid Status: > 8 hours  NPO Liquid Status: > 8 hours           Airway   Mallampati: I  TM distance: >3 FB  Neck ROM: full  no difficulty expected  Dental - normal exam     Pulmonary - negative pulmonary ROS and normal exam   Cardiovascular - negative cardio ROS and normal exam        Neuro/Psych- negative ROS  GI/Hepatic/Renal/Endo - negative ROS     Musculoskeletal (-) negative ROS    Abdominal    Substance History - negative use     OB/GYN negative ob/gyn ROS         Other - negative ROS                       Anesthesia Plan    ASA 1     MAC     intravenous induction     Anesthetic plan, risks, benefits, and alternatives have been provided, discussed and informed consent has been obtained with: patient.        CODE STATUS:

## 2022-06-24 NOTE — ANESTHESIA POSTPROCEDURE EVALUATION
Patient: William Maria    Procedure Summary     Date: 06/24/22 Room / Location: [facility] ENDOSCOPY 2 / [facility] ENDOSCOPY    Anesthesia Start: 1412 Anesthesia Stop: 1452    Procedures:       ESOPHAGOGASTRODUODENOSCOPY with biopsy (N/A Esophagus)      COLONOSCOPY with biopsy and polypectomy (N/A Anus) Diagnosis:       Anemia, unspecified type      (Anemia, unspecified type [D64.9])    Surgeons: Lc Steinberg MD Provider: Zion Rowley CRNA    Anesthesia Type: MAC ASA Status: 1          Anesthesia Type: MAC    Vitals  Vitals Value Taken Time   /89 06/24/22 1525   Temp 97.6 °F (36.4 °C) 06/24/22 1455   Pulse 91 06/24/22 1525   Resp 20 06/24/22 1525   SpO2 99 % 06/24/22 1525           Post Anesthesia Care and Evaluation    Patient location during evaluation: PHASE II  Patient participation: complete - patient participated  Level of consciousness: awake and alert  Pain score: 0  Pain management: adequate    Airway patency: patent  Anesthetic complications: No anesthetic complications  PONV Status: none  Cardiovascular status: acceptable  Respiratory status: acceptable  Hydration status: acceptable  No anesthesia care post op

## 2022-06-24 NOTE — DISCHARGE INSTRUCTIONS
- Discharge patient to home (ambulatory).   - Resume previous diet.   - Continue present medications.   - Avoid NSAIDS  - Await pathology results.   - Proceed with colonoscopy    No pushing, pulling, tugging,  heavy lifting, or strenuous activity.  No major decision making, driving, or drinking alcoholic beverages for 24 hours. ( due to the medications you have  received)  Always use good hand hygiene/washing techniques.  NO driving while taking pain medications.    Rest today    To assist you in voiding:  Drink plenty of fluids  Listen to running water while attempting to void.    If you are unable to urinate and you have an uncomfortable urge to void or it has been   6 hours since you were discharged, return to the Emergency Room

## 2022-06-29 LAB — REF LAB TEST METHOD: NORMAL

## 2022-06-30 ENCOUNTER — TELEPHONE (OUTPATIENT)
Dept: GASTROENTEROLOGY | Facility: CLINIC | Age: 47
End: 2022-06-30

## 2022-06-30 NOTE — TELEPHONE ENCOUNTER
Spoke with patient's wife regarding a follow-up appointment.  At this point, they do not wish to schedule an appointment but will call back in the future if needed.

## 2022-07-09 NOTE — PROGRESS NOTES
"  Subjective     PROBLEM LIST:  1. Iron deficiency anemia      CHIEF COMPLAINT: anemia      HISTORY OF PRESENT ILLNESS:  The patient is a 46 y.o. male, referred for evaluation of anemia.    Labs 22: hgb 10.3, mcv 74.2, plt 457, wbc 12.7, iron 11, tibc 225, iron sat 5%, ferritin 726    22: , hgb 10.5, mcv 74.2, iron 18, iron sat 6%, testosterone free 60.2    Upper and lower endoscopy were done on 2022.  A few small erosions with stigmata of recent bleeding found in gastric fundus.   Biopsies of gastric antrum showed reactive gastropathy.  Capsule study has been ordered.    He reports that his grandmother  in 2021 and he spent some time cleaning out her basement.  About 6 weeks after this he developed a persistent cough and then a rash that was mainly on his legs.  In July of that year he saw his primary care provider who prescribed a steroid cream which did help.  Over the past year he has lost about 43 pounds.  Some of this was when they had some increased stress at home.  His weight has been stable for the past 2 or 2-1/2 months.  The rash is improved in the past few months as well and his energy seems to improve.  He has been taking an over-the-counter iron supplement.  He has had some night sweats and chills as well as fatigue but this seems to be improving as well.    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        REVIEW OF SYSTEMS:  A 14 point review of systems was performed and is negative except as noted above.    Past Medical History:   Diagnosis Date   • Anemia              Objective     /66   Pulse 75   Temp 98.6 °F (37 °C) (Temporal)   Resp 12   Ht 188 cm (74\")   Wt 92.1 kg (203 lb)   SpO2 100%   BMI 26.06 kg/m²   Performance Status:0              General: well appearing male in " no acute distress  Neuro: alert and oriented  HEENT: sclerae anicteric, oropharynx clear  Lymphatics: no cervical, supraclavicular, or axillary adenopathy  Cardiovascular: regular rate and rhythm, no murmurs  Lungs: clear to auscultation bilaterally  Abdomen: soft, nontender, nondistended.  No palpable organomegaly  Extremities: no lower extremity edema  Skin: no rashes, lesions, bruising, or petechiae  Psych: mood and affect appropriate    No results found for: WBC, HGB, HCT, MCV, PLT  Lab Results   Component Value Date    GLUCOSE 85 06/02/2022    BUN 16 06/02/2022    CREATININE 0.88 06/02/2022    BCR 18.2 06/02/2022    K 4.2 06/02/2022    CO2 25.5 06/02/2022    CALCIUM 9.1 06/02/2022    PROTENTOTREF 7.3 06/02/2022    ALBUMIN 3.20 (L) 06/02/2022    LABIL2 0.8 06/02/2022    AST 18 06/02/2022    ALT 16 06/02/2022       US Abdomen Complete  Narrative: PROCEDURE: US ABDOMEN COMPLETE-     HISTORY: Thombocytosis/iron def anemia; D50.9-Iron deficiency anemia,  unspecified; D50.9-Iron deficiency anemia, unspecified;  D75.839-Thrombocytosis, unspecified     PROCEDURE: Ultrasound images of the abdomen were obtained.     FINDINGS:   The pancreas is obscured by bowel gas. The liver parenchyma  is of proper echogenicity. The gall bladder proper size with no wall  thickening or pericholecystic fluid. The common duct 4.70 mm. The right  kidney measures 11.2 cm in length and is normal in echogenicity without  hydronephrosis. No cortical thinning identified. The left kidney  measures 12.7 cm in length and is normal in echogenicity without  hydronephrosis.   No cortical thinning noted.  Spleen is upper limits of  normal in size at 12.4 cm with no focal mass. The aorta is normal in  caliber. The vena cava is unremarkable.     Impression: Unremarkable ultrasound of the abdomen.           This report was signed and finalized on 6/18/2022 11:11 AM by Lara España MD.            ASSESSMENT AND PLAN:     William Maria is a 46 y.o. male  with microcytic anemia and weight loss.    His labs do show low iron and iron saturation levels, but elevated ferritin.  TIBC is low.  This pattern suggests at least some component of anemia of inflammation or chronic disease, although there could also be superimposed iron deficiency.  We will recheck labs today to see if his iron levels and anemia are improving given the improvement in his energy levels.  He is also at some risk for heavy metal toxicity so I will screen for this via lab work as well.    It is encouraging that his symptoms and weight have stabilized over the past few months.  We did discuss that autoimmune disease was another possible category of illness that could be associated with anemia as well as other systemic symptoms.    Follow-up in 2 weeks to review lab results.           A total greater than 60 mins minutes was spent in face to face patient time, examination, counseling, charting, reviewing test results, and reviewing outside records.    Tamie Mccullough MD    7/12/2022

## 2022-07-12 ENCOUNTER — CONSULT (OUTPATIENT)
Dept: ONCOLOGY | Facility: CLINIC | Age: 47
End: 2022-07-12

## 2022-07-12 ENCOUNTER — LAB (OUTPATIENT)
Dept: LAB | Facility: HOSPITAL | Age: 47
End: 2022-07-12

## 2022-07-12 VITALS
HEIGHT: 74 IN | BODY MASS INDEX: 26.05 KG/M2 | OXYGEN SATURATION: 100 % | SYSTOLIC BLOOD PRESSURE: 133 MMHG | TEMPERATURE: 98.6 F | WEIGHT: 203 LBS | DIASTOLIC BLOOD PRESSURE: 66 MMHG | RESPIRATION RATE: 12 BRPM | HEART RATE: 75 BPM

## 2022-07-12 DIAGNOSIS — D64.9 ANEMIA, UNSPECIFIED TYPE: Primary | ICD-10-CM

## 2022-07-12 LAB
ALBUMIN SERPL-MCNC: 3.7 G/DL (ref 3.5–5.2)
ALBUMIN/GLOB SERPL: 0.9 G/DL
ALP SERPL-CCNC: 86 U/L (ref 39–117)
ALT SERPL W P-5'-P-CCNC: 14 U/L (ref 1–41)
ANION GAP SERPL CALCULATED.3IONS-SCNC: 14.3 MMOL/L (ref 5–15)
ANISOCYTOSIS BLD QL: NORMAL
AST SERPL-CCNC: 18 U/L (ref 1–40)
BASOPHILS # BLD AUTO: 0.05 10*3/MM3 (ref 0–0.2)
BASOPHILS NFR BLD AUTO: 0.4 % (ref 0–1.5)
BILIRUB SERPL-MCNC: 0.2 MG/DL (ref 0–1.2)
BUN SERPL-MCNC: 15 MG/DL (ref 6–20)
BUN/CREAT SERPL: 16.1 (ref 7–25)
CALCIUM SPEC-SCNC: 9.3 MG/DL (ref 8.6–10.5)
CHLORIDE SERPL-SCNC: 100 MMOL/L (ref 98–107)
CO2 SERPL-SCNC: 25.7 MMOL/L (ref 22–29)
CREAT SERPL-MCNC: 0.93 MG/DL (ref 0.76–1.27)
CRP SERPL-MCNC: 13.75 MG/DL (ref 0–0.5)
DEPRECATED RDW RBC AUTO: 43.8 FL (ref 37–54)
EGFRCR SERPLBLD CKD-EPI 2021: 103.8 ML/MIN/1.73
EOSINOPHIL # BLD AUTO: 0.34 10*3/MM3 (ref 0–0.4)
EOSINOPHIL NFR BLD AUTO: 2.8 % (ref 0.3–6.2)
ERYTHROCYTE [DISTWIDTH] IN BLOOD BY AUTOMATED COUNT: 16.7 % (ref 12.3–15.4)
ERYTHROCYTE [SEDIMENTATION RATE] IN BLOOD: 55 MM/HR (ref 0–15)
FERRITIN SERPL-MCNC: 917.9 NG/ML (ref 30–400)
GLOBULIN UR ELPH-MCNC: 4.1 GM/DL
GLUCOSE SERPL-MCNC: 94 MG/DL (ref 65–99)
HCT VFR BLD AUTO: 34.1 % (ref 37.5–51)
HGB BLD-MCNC: 10.1 G/DL (ref 13–17.7)
HYPOCHROMIA BLD QL: NORMAL
IMM GRANULOCYTES # BLD AUTO: 0.06 10*3/MM3 (ref 0–0.05)
IMM GRANULOCYTES NFR BLD AUTO: 0.5 % (ref 0–0.5)
IRON 24H UR-MRATE: 14 MCG/DL (ref 59–158)
IRON SATN MFR SERPL: 5 % (ref 20–50)
LDH SERPL-CCNC: 235 U/L (ref 135–225)
LYMPHOCYTES # BLD AUTO: 1.13 10*3/MM3 (ref 0.7–3.1)
LYMPHOCYTES NFR BLD AUTO: 9.2 % (ref 19.6–45.3)
MCH RBC QN AUTO: 21.9 PG (ref 26.6–33)
MCHC RBC AUTO-ENTMCNC: 29.6 G/DL (ref 31.5–35.7)
MCV RBC AUTO: 74 FL (ref 79–97)
MICROCYTES BLD QL: NORMAL
MONOCYTES # BLD AUTO: 1.11 10*3/MM3 (ref 0.1–0.9)
MONOCYTES NFR BLD AUTO: 9 % (ref 5–12)
NEUTROPHILS NFR BLD AUTO: 78.1 % (ref 42.7–76)
NEUTROPHILS NFR BLD AUTO: 9.65 10*3/MM3 (ref 1.7–7)
NRBC BLD AUTO-RTO: 0 /100 WBC (ref 0–0.2)
PLATELET # BLD AUTO: 444 10*3/MM3 (ref 140–450)
PMV BLD AUTO: 10.5 FL (ref 6–12)
POTASSIUM SERPL-SCNC: 4.2 MMOL/L (ref 3.5–5.2)
PROT SERPL-MCNC: 7.8 G/DL (ref 6–8.5)
RBC # BLD AUTO: 4.61 10*6/MM3 (ref 4.14–5.8)
RETICS # AUTO: 0.08 10*6/MM3 (ref 0.02–0.13)
RETICS/RBC NFR AUTO: 1.73 % (ref 0.7–1.9)
SMALL PLATELETS BLD QL SMEAR: ADEQUATE
SODIUM SERPL-SCNC: 140 MMOL/L (ref 136–145)
TIBC SERPL-MCNC: 255 MCG/DL (ref 298–536)
TRANSFERRIN SERPL-MCNC: 171 MG/DL (ref 200–360)
WBC MORPH BLD: NORMAL
WBC NRBC COR # BLD: 12.34 10*3/MM3 (ref 3.4–10.8)

## 2022-07-12 PROCEDURE — 84630 ASSAY OF ZINC: CPT | Performed by: INTERNAL MEDICINE

## 2022-07-12 PROCEDURE — 84165 PROTEIN E-PHORESIS SERUM: CPT | Performed by: INTERNAL MEDICINE

## 2022-07-12 PROCEDURE — 86140 C-REACTIVE PROTEIN: CPT | Performed by: INTERNAL MEDICINE

## 2022-07-12 PROCEDURE — 83615 LACTATE (LD) (LDH) ENZYME: CPT | Performed by: INTERNAL MEDICINE

## 2022-07-12 PROCEDURE — 82607 VITAMIN B-12: CPT | Performed by: INTERNAL MEDICINE

## 2022-07-12 PROCEDURE — 82525 ASSAY OF COPPER: CPT | Performed by: INTERNAL MEDICINE

## 2022-07-12 PROCEDURE — 83540 ASSAY OF IRON: CPT | Performed by: INTERNAL MEDICINE

## 2022-07-12 PROCEDURE — 86334 IMMUNOFIX E-PHORESIS SERUM: CPT | Performed by: INTERNAL MEDICINE

## 2022-07-12 PROCEDURE — 84466 ASSAY OF TRANSFERRIN: CPT | Performed by: INTERNAL MEDICINE

## 2022-07-12 PROCEDURE — 83655 ASSAY OF LEAD: CPT | Performed by: INTERNAL MEDICINE

## 2022-07-12 PROCEDURE — 82746 ASSAY OF FOLIC ACID SERUM: CPT | Performed by: INTERNAL MEDICINE

## 2022-07-12 PROCEDURE — 82784 ASSAY IGA/IGD/IGG/IGM EACH: CPT | Performed by: INTERNAL MEDICINE

## 2022-07-12 PROCEDURE — 80053 COMPREHEN METABOLIC PANEL: CPT | Performed by: INTERNAL MEDICINE

## 2022-07-12 PROCEDURE — 82728 ASSAY OF FERRITIN: CPT | Performed by: INTERNAL MEDICINE

## 2022-07-12 PROCEDURE — 85652 RBC SED RATE AUTOMATED: CPT | Performed by: INTERNAL MEDICINE

## 2022-07-12 PROCEDURE — 99205 OFFICE O/P NEW HI 60 MIN: CPT | Performed by: INTERNAL MEDICINE

## 2022-07-12 PROCEDURE — 85025 COMPLETE CBC W/AUTO DIFF WBC: CPT | Performed by: INTERNAL MEDICINE

## 2022-07-12 PROCEDURE — 85045 AUTOMATED RETICULOCYTE COUNT: CPT | Performed by: INTERNAL MEDICINE

## 2022-07-12 PROCEDURE — 36415 COLL VENOUS BLD VENIPUNCTURE: CPT | Performed by: INTERNAL MEDICINE

## 2022-07-12 PROCEDURE — 85007 BL SMEAR W/DIFF WBC COUNT: CPT | Performed by: INTERNAL MEDICINE

## 2022-07-13 LAB
ALBUMIN SERPL ELPH-MCNC: 3 G/DL (ref 2.9–4.4)
ALBUMIN/GLOB SERPL: 0.8 {RATIO} (ref 0.7–1.7)
ALPHA1 GLOB SERPL ELPH-MCNC: 0.5 G/DL (ref 0–0.4)
ALPHA2 GLOB SERPL ELPH-MCNC: 1.2 G/DL (ref 0.4–1)
B-GLOBULIN SERPL ELPH-MCNC: 1.1 G/DL (ref 0.7–1.3)
FOLATE SERPL-MCNC: 16.8 NG/ML (ref 4.78–24.2)
GAMMA GLOB SERPL ELPH-MCNC: 1.4 G/DL (ref 0.4–1.8)
GLOBULIN SER-MCNC: 4.1 G/DL (ref 2.2–3.9)
IGA SERPL-MCNC: 282 MG/DL (ref 90–386)
IGG SERPL-MCNC: 1397 MG/DL (ref 603–1613)
IGM SERPL-MCNC: 93 MG/DL (ref 20–172)
INTERPRETATION SERPL IEP-IMP: ABNORMAL
LABORATORY COMMENT REPORT: ABNORMAL
M PROTEIN SERPL ELPH-MCNC: ABNORMAL G/DL
PROT SERPL-MCNC: 7.1 G/DL (ref 6–8.5)
VIT B12 BLD-MCNC: 678 PG/ML (ref 211–946)

## 2022-07-14 LAB
LEAD BLDV-MCNC: <1 UG/DL (ref 0–4)
ZPP RBC-MCNC: 60 UG/DL (ref 0–99)

## 2022-07-16 LAB
COPPER SERPL-MCNC: 159 UG/DL (ref 69–132)
ZINC SERPL-MCNC: 59 UG/DL (ref 44–115)

## 2022-07-18 ENCOUNTER — OFFICE VISIT (OUTPATIENT)
Dept: FAMILY MEDICINE CLINIC | Facility: CLINIC | Age: 47
End: 2022-07-18

## 2022-07-18 VITALS
RESPIRATION RATE: 16 BRPM | BODY MASS INDEX: 26.05 KG/M2 | WEIGHT: 203 LBS | SYSTOLIC BLOOD PRESSURE: 108 MMHG | DIASTOLIC BLOOD PRESSURE: 58 MMHG | HEART RATE: 77 BPM | TEMPERATURE: 98 F | HEIGHT: 74 IN | OXYGEN SATURATION: 98 %

## 2022-07-18 DIAGNOSIS — R53.81 MALAISE AND FATIGUE: ICD-10-CM

## 2022-07-18 DIAGNOSIS — D50.9 IRON DEFICIENCY ANEMIA, UNSPECIFIED IRON DEFICIENCY ANEMIA TYPE: Primary | ICD-10-CM

## 2022-07-18 DIAGNOSIS — R53.83 MALAISE AND FATIGUE: ICD-10-CM

## 2022-07-18 DIAGNOSIS — D72.829 LEUKOCYTOSIS, UNSPECIFIED TYPE: ICD-10-CM

## 2022-07-18 PROCEDURE — 99214 OFFICE O/P EST MOD 30 MIN: CPT | Performed by: FAMILY MEDICINE

## 2022-07-18 NOTE — PROGRESS NOTES
"                      Established Patient        Chief Complaint:   Chief Complaint   Patient presents with   • Follow-up        William Maria is a 46 y.o. male    History of Present Illness:   Here today in scheduled follow-up visit of his iron deficiency anemia, malaise/fatigue and leukocytosis.    Patient has been seen by hematology.  An assortment of labs were ordered for further evaluation, and hopeful definition of etiology for abnormalities.  Patient states he has been able to continue his daily activities of living, including work-related obligations; he has also made efforts to try to return to exercise program, and although he has improved considerably, still describes \"something is not quite right\".  He denies any focal episodes of night sweats since last visit.    He has undergone upper and lower endoscopy without any significant/clinically relevant abnormalities.    He reports balanced dietary intake, good hydration; continues to utilize nutritional supplements.      Subjective     The following portions of the patient's history were reviewed and updated as appropriate: allergies, current medications, past family history, past medical history, past social history, past surgical history and problem list.    No Known Allergies    Review of Systems  1. Constitutional: Negative for fever. Negative for chills, diaphoresis: Improved malaise/fatigue, however still present.  Relative weight stability.  2. HENT: No dysphagia; no changes to vision/hearing/smell/taste; no epistaxis  3. Eyes: Negative for redness and visual disturbance.   4. Respiratory: negative for shortness of breath. Negative for chest pain . Negative for cough and chest tightness.   5. Cardiovascular: Negative for chest pain and palpitations.   6. Gastrointestinal: Negative for abdominal distention, abdominal pain and blood in stool.   7. Endocrine: Negative for cold intolerance and heat intolerance.   8. Genitourinary: Negative for " "difficulty urinating, dysuria and frequency.   9. Musculoskeletal: Negative for arthralgias, back pain and myalgias.   10. Skin: Negative for color change, rash and wound.   11. Neurological: Negative for syncope, weakness and headaches.   12. Hematological: Negative for adenopathy. Does not bruise/bleed easily.   13. Psychiatric/Behavioral: Negative for confusion. The patient is not nervous/anxious.    Objective     Physical Exam   Vital Signs: /58   Pulse 77   Temp 98 °F (36.7 °C)   Resp 16   Ht 188 cm (74\")   Wt 92.1 kg (203 lb)   SpO2 98%   BMI 26.06 kg/m²   BMI is >= 25 and <30. (Overweight) The following options were offered after discussion;: none (medical contraindication)    General Appearance: alert, oriented x 3, no acute distress.  Skin: warm and dry.   HEENT: Atraumatic.  pupils round and reactive to light and accommodation, oral mucosa pink and moist.  Nares patent without epistaxis.  External auditory canals are patent tympanic membranes intact.  Neck: supple, no JVD, trachea midline.  No thyromegaly  Lungs: CTA, unlabored breathing effort.  Heart: RRR, normal S1 and S2, no S3, no rub.  Abdomen: soft, non-tender, no palpable bladder, present bowel sounds to auscultation ×4.  No guarding or rigidity.  Extremities: no clubbing, cyanosis or edema.  Good range of motion actively and passively.  Symmetric muscle strength and development  Neuro: normal speech and mental status.  Cranial nerves II through XII intact.  No anosmia. DTR 2+; proprioception intact.  No focal motor/sensory deficits.    Assessment and Plan      Assessment/Plan:   Diagnoses and all orders for this visit:    1. Iron deficiency anemia, unspecified iron deficiency anemia type (Primary)    2. Leukocytosis, unspecified type    3. Malaise and fatigue        Keep appt to see hematology next week.  His most recent labs demonstrate continued microcytic anemia, with low TIBC and iron level, however his ferritin level remains " "elevated.  This could be due to acute phase reactant nature of ferritin.  Continues to demonstrate a mild leukocytosis additionally.    Most recent ultrasound demonstrated upper end of normal size of spleen; I am concerned over the possibility of autoimmune inflammatory condition versus ill-defined lymphoma as being causes of his spleen size, anemia/leukocytosis and clinical symptoms of malaise/fatigue, night sweats and weight loss.  His weight has relatively stabilized on review today.  Although patient states he does not feel as bad as he previously was, he still describes \"something is not quite right\".    I think it would be reasonable to consider an enhanced CT scan of the chest.  Patient will need surveillance labs to monitor labs, as he may require bone marrow biopsy.    I have reviewed results of most recent labs with him today.    Discussion Summary:    I spent 30 minutes caring for William on this date of service. This time includes time spent by me in the following activities:preparing for the visit, reviewing tests, performing a medically appropriate examination and/or evaluation , counseling and educating the patient/family/caregiver, ordering medications, tests, or procedures, documenting information in the medical record and care coordination    Discussed plan of care in detail with pt today; pt verb understanding and agrees.  Follow up:  Return in about 6 weeks (around 8/29/2022) for Recheck, Med Change/New Meds.     There are no Patient Instructions on file for this visit.    Alex Flores,   07/20/22  13:37 EDT          Please note that portions of this note may have been completed with a voice recognition program. Efforts were made to edit the dictations, but occasionally words are mistranscribed.  "

## 2022-07-20 PROBLEM — R53.81 MALAISE AND FATIGUE: Status: ACTIVE | Noted: 2022-07-20

## 2022-07-20 PROBLEM — R53.83 MALAISE AND FATIGUE: Status: ACTIVE | Noted: 2022-07-20

## 2022-07-20 PROBLEM — D72.829 LEUKOCYTOSIS: Status: ACTIVE | Noted: 2022-07-20

## 2022-07-26 ENCOUNTER — OFFICE VISIT (OUTPATIENT)
Dept: ONCOLOGY | Facility: CLINIC | Age: 47
End: 2022-07-26

## 2022-07-26 VITALS
HEART RATE: 89 BPM | WEIGHT: 204 LBS | OXYGEN SATURATION: 99 % | TEMPERATURE: 98.6 F | RESPIRATION RATE: 12 BRPM | BODY MASS INDEX: 26.18 KG/M2 | SYSTOLIC BLOOD PRESSURE: 131 MMHG | DIASTOLIC BLOOD PRESSURE: 68 MMHG | HEIGHT: 74 IN

## 2022-07-26 DIAGNOSIS — D64.9 ANEMIA, UNSPECIFIED TYPE: Primary | ICD-10-CM

## 2022-07-26 PROCEDURE — 99214 OFFICE O/P EST MOD 30 MIN: CPT | Performed by: INTERNAL MEDICINE

## 2022-07-26 NOTE — PROGRESS NOTES
"      PROBLEM LIST:  1. Microcytic anemia  2. Weight loss    Subjective     CHIEF COMPLAINT: anemia    HISTORY OF PRESENT ILLNESS:   William Maria returns for follow-up.   He presents to review his lab results.      Objective      /68   Pulse 89   Temp 98.6 °F (37 °C) (Temporal)   Resp 12   Ht 188 cm (74\")   Wt 92.5 kg (204 lb)   SpO2 99%   BMI 26.19 kg/m²      Performance Status:                General: well appearing male in no acute distress  Neuro: alert and oriented  HEENT: sclera anicteric, oropharynx clear  Extremeties: no lower extremity edema  Skin: faint nonerythematous rash on the lower extremeties  Psych: mood and affect appropriate          RECENT LABS:  Lab Results   Component Value Date    WBC 12.34 (H) 07/12/2022    HGB 10.1 (L) 07/12/2022    HCT 34.1 (L) 07/12/2022    MCV 74.0 (L) 07/12/2022     07/12/2022       Lab Results   Component Value Date    GLUCOSE 94 07/12/2022    BUN 15 07/12/2022    CREATININE 0.93 07/12/2022    BCR 16.1 07/12/2022    K 4.2 07/12/2022    CO2 25.7 07/12/2022    CALCIUM 9.3 07/12/2022    PROTENTOTREF 7.1 07/12/2022    ALBUMIN 3.70 07/12/2022    ALBUMIN 3.0 07/12/2022    LABIL2 0.8 07/12/2022    AST 18 07/12/2022    ALT 14 07/12/2022                   ASSESSMENT AND PLAN:     William Maria is a 46 y.o. male with microcytic anemia and weight loss, as well as elevated inflammatory markers.    We reviewed his lab results which showed no evidence of any heavy metal toxicity or zinc deficiency.  His labs are consistent with anemia of chronic disease, although there may be a component of iron deficiency as well.  We discussed options for further evaluation including a bone marrow biopsy, or potentially CT scans to evaluate for other reasons for his weight loss.  I think it may be reasonable to move forward with a bone marrow biopsy to evaluate his anemia and mild leukocytosis.  He wants to think about this and will call the office with with his decision.  " From my standpoint it would be reasonable to do either a bone marrow biopsy, or CT scan chest abdomen pelvis w contrast as the next step in evaluation.    We will schedule followup once we hear back from him.      Total time of patient care on day of service including time prior to, face to face with patient, and following visit spent in reviewing records, lab results, discussion with patient, and documentation/charting was > 33 minutes.                    Tamie Mccullough MD  [facility] Hematology and Oncology    7/26/2022          CC:

## 2022-08-08 ENCOUNTER — TELEPHONE (OUTPATIENT)
Dept: ONCOLOGY | Facility: CLINIC | Age: 47
End: 2022-08-08

## 2022-08-08 DIAGNOSIS — D64.9 ANEMIA, UNSPECIFIED TYPE: Primary | ICD-10-CM

## 2022-08-08 DIAGNOSIS — R63.4 WEIGHT LOSS: ICD-10-CM

## 2022-08-08 NOTE — TELEPHONE ENCOUNTER
Caller: MEREDITH    Relationship to patient: WIFE    Best call back number: [phone]    Patient is needing: TO SCHEDULE BIOPSY.

## 2022-08-08 NOTE — TELEPHONE ENCOUNTER
Per Dr. Rivera note, I put in order for bone marrow biopsy and sent msg to [facility] to schedule it.  I told Velvet to expect a call from [facility] or scheduling.  She verbalized understanding.

## 2022-08-12 ENCOUNTER — HOSPITAL ENCOUNTER (OUTPATIENT)
Dept: CT IMAGING | Facility: HOSPITAL | Age: 47
Discharge: HOME | End: 2022-08-12
Admitting: PHYSICIAN ASSISTANT

## 2022-08-12 VITALS
SYSTOLIC BLOOD PRESSURE: 104 MMHG | RESPIRATION RATE: 18 BRPM | OXYGEN SATURATION: 96 % | TEMPERATURE: 97.7 F | HEART RATE: 80 BPM | DIASTOLIC BLOOD PRESSURE: 61 MMHG

## 2022-08-12 DIAGNOSIS — D64.9 ANEMIA, UNSPECIFIED TYPE: ICD-10-CM

## 2022-08-12 DIAGNOSIS — R63.4 WEIGHT LOSS: ICD-10-CM

## 2022-08-12 LAB
APTT PPP: 37.2 SECONDS (ref 23.5–35.5)
DEPRECATED RDW RBC AUTO: 44.2 FL (ref 37–54)
ERYTHROCYTE [DISTWIDTH] IN BLOOD BY AUTOMATED COUNT: 16.8 % (ref 12.3–15.4)
HCT VFR BLD AUTO: 33.3 % (ref 37.5–51)
HGB BLD-MCNC: 10 G/DL (ref 13–17.7)
INR PPP: 1.3 (ref 0.9–1.1)
MCH RBC QN AUTO: 22 PG (ref 26.6–33)
MCHC RBC AUTO-ENTMCNC: 30 G/DL (ref 31.5–35.7)
MCV RBC AUTO: 73.3 FL (ref 79–97)
PLATELET # BLD AUTO: 432 10*3/MM3 (ref 140–450)
PMV BLD AUTO: 9.9 FL (ref 6–12)
PROTHROMBIN TIME: 16.6 SECONDS (ref 12.5–14.5)
RBC # BLD AUTO: 4.54 10*6/MM3 (ref 4.14–5.8)
WBC NRBC COR # BLD: 15.14 10*3/MM3 (ref 3.4–10.8)

## 2022-08-12 PROCEDURE — 77012 CT SCAN FOR NEEDLE BIOPSY: CPT

## 2022-08-12 PROCEDURE — 85610 PROTHROMBIN TIME: CPT | Performed by: RADIOLOGY

## 2022-08-12 PROCEDURE — 85730 THROMBOPLASTIN TIME PARTIAL: CPT | Performed by: RADIOLOGY

## 2022-08-12 PROCEDURE — 25010000002 MIDAZOLAM PER 1MG: Performed by: PHYSICIAN ASSISTANT

## 2022-08-12 PROCEDURE — 85027 COMPLETE CBC AUTOMATED: CPT | Performed by: RADIOLOGY

## 2022-08-12 PROCEDURE — 25010000002 FENTANYL CITRATE (PF) 50 MCG/ML SOLUTION: Performed by: PHYSICIAN ASSISTANT

## 2022-08-12 RX ORDER — FENTANYL CITRATE 50 UG/ML
INJECTION, SOLUTION INTRAMUSCULAR; INTRAVENOUS
Status: COMPLETED | OUTPATIENT
Start: 2022-08-12 | End: 2022-08-12

## 2022-08-12 RX ORDER — SODIUM CHLORIDE 9 MG/ML
30 INJECTION, SOLUTION INTRAVENOUS CONTINUOUS
Status: DISCONTINUED | OUTPATIENT
Start: 2022-08-12 | End: 2022-08-13 | Stop reason: HOSPADM

## 2022-08-12 RX ORDER — MIDAZOLAM HYDROCHLORIDE 2 MG/2ML
INJECTION, SOLUTION INTRAMUSCULAR; INTRAVENOUS
Status: COMPLETED | OUTPATIENT
Start: 2022-08-12 | End: 2022-08-12

## 2022-08-12 RX ADMIN — SODIUM CHLORIDE 30 ML/HR: 9 INJECTION, SOLUTION INTRAVENOUS at 08:34

## 2022-08-12 RX ADMIN — FENTANYL CITRATE 50 MCG: 50 INJECTION INTRAMUSCULAR; INTRAVENOUS at 10:03

## 2022-08-12 RX ADMIN — MIDAZOLAM HYDROCHLORIDE 1 MG: 1 INJECTION, SOLUTION INTRAMUSCULAR; INTRAVENOUS at 10:08

## 2022-08-12 RX ADMIN — MIDAZOLAM HYDROCHLORIDE 1 MG: 1 INJECTION, SOLUTION INTRAMUSCULAR; INTRAVENOUS at 10:03

## 2022-08-12 RX ADMIN — FENTANYL CITRATE 50 MCG: 50 INJECTION INTRAMUSCULAR; INTRAVENOUS at 10:08

## 2022-08-12 NOTE — DISCHARGE INSTRUCTIONS

## 2022-08-17 ENCOUNTER — TELEPHONE (OUTPATIENT)
Dept: ONCOLOGY | Facility: CLINIC | Age: 47
End: 2022-08-17

## 2022-08-17 DIAGNOSIS — R63.4 WEIGHT LOSS: Primary | ICD-10-CM

## 2022-08-17 NOTE — TELEPHONE ENCOUNTER
Called patient re: bone marrow results which are normal.  Discussed doing ct c/a/p for ongoing workup of weight loss.  Will try to schedule prior to appt next week.

## 2022-08-23 ENCOUNTER — OFFICE VISIT (OUTPATIENT)
Dept: ONCOLOGY | Facility: CLINIC | Age: 47
End: 2022-08-23

## 2022-08-23 VITALS
DIASTOLIC BLOOD PRESSURE: 65 MMHG | SYSTOLIC BLOOD PRESSURE: 118 MMHG | TEMPERATURE: 98.2 F | HEART RATE: 71 BPM | RESPIRATION RATE: 12 BRPM | BODY MASS INDEX: 26.18 KG/M2 | OXYGEN SATURATION: 99 % | HEIGHT: 74 IN | WEIGHT: 204 LBS

## 2022-08-23 DIAGNOSIS — D64.9 ANEMIA, UNSPECIFIED TYPE: Primary | ICD-10-CM

## 2022-08-23 PROCEDURE — 99215 OFFICE O/P EST HI 40 MIN: CPT | Performed by: INTERNAL MEDICINE

## 2022-08-23 NOTE — PROGRESS NOTES
"      PROBLEM LIST:  1. Microcytic anemia  A) bone marrow biopsy 8/12/2022 showed a normocellular bone marrow with maturing trilineage hematopoiesis, increased stainable marrow iron, no evidence of dysplasia, fibrosis, or increased blasts.  Labs showed no evidence of nutrient deficiency, or heavy metal toxicity.  2. Weight loss    Subjective     CHIEF COMPLAINT: anemia    HISTORY OF PRESENT ILLNESS:   William Maria returns for follow-up.  He had a bone marrow biopsy done and is here to discuss the results.    He says that he has been feeling okay.  He rates his energy as a 6 out of 10.  He definitely does not feel like his normal self, but he feels better than he did last winter.  Weight has been stable for the past few months.      Objective      /65   Pulse 71   Temp 98.2 °F (36.8 °C) (Temporal)   Resp 12   Ht 188 cm (74\")   Wt 92.5 kg (204 lb)   SpO2 99%   BMI 26.19 kg/m²      Performance Status:0                General: well appearing male in no acute distress  Neuro: alert and oriented  HEENT: sclera anicteric, oropharynx clear  Extremeties: no lower extremity edema  Skin: faint nonerythematous rash on the lower extremeties  Psych: mood and affect appropriate          RECENT LABS:  Lab Results   Component Value Date    WBC 15.14 (H) 08/12/2022    HGB 10.0 (L) 08/12/2022    HCT 33.3 (L) 08/12/2022    MCV 73.3 (L) 08/12/2022     08/12/2022       Lab Results   Component Value Date    GLUCOSE 94 07/12/2022    BUN 15 07/12/2022    CREATININE 0.93 07/12/2022    BCR 16.1 07/12/2022    K 4.2 07/12/2022    CO2 25.7 07/12/2022    CALCIUM 9.3 07/12/2022    PROTENTOTREF 7.1 07/12/2022    ALBUMIN 3.70 07/12/2022    ALBUMIN 3.0 07/12/2022    LABIL2 0.8 07/12/2022    AST 18 07/12/2022    ALT 14 07/12/2022                   ASSESSMENT AND PLAN:     William Maria is a 46 y.o. male with microcytic anemia and weight loss, as well as elevated inflammatory markers.    Anemia of chronic disease: No bone marrow " abnormalities.  Of note he did have increased iron in the bone marrow, arguing against iron deficiency as the cause of his microcytic anemia.  I spoke to him on the phone about these results and I had discussed doing scans just to rule out going to malignancy as a cause for his microcytic anemia as well as unexplained weight loss.  Because he does not have any history of routine blood work, it is not clear that the microcytosis is new.  When he comes for his scans I will also check labs to evaluate for a possible thalassemia or other hemoglobinopathy.    Follow-up in about 1 month.          Total time of patient care on day of service including time prior to, face to face with patient, and following visit spent in reviewing records, lab results, discussion with patient, and documentation/charting was > 41 minutes.                    Tamie Mccullough MD  [facility] Hematology and Oncology    8/23/2022          CC:

## 2022-08-24 LAB — REF LAB TEST METHOD: NORMAL

## 2022-09-07 ENCOUNTER — LAB (OUTPATIENT)
Dept: LAB | Facility: HOSPITAL | Age: 47
End: 2022-09-07

## 2022-09-07 ENCOUNTER — HOSPITAL ENCOUNTER (OUTPATIENT)
Dept: CT IMAGING | Facility: HOSPITAL | Age: 47
Discharge: HOME | End: 2022-09-07

## 2022-09-07 DIAGNOSIS — D64.9 ANEMIA, UNSPECIFIED TYPE: ICD-10-CM

## 2022-09-07 DIAGNOSIS — R63.4 WEIGHT LOSS: ICD-10-CM

## 2022-09-07 LAB
BASOPHILS # BLD AUTO: 0.04 10*3/MM3 (ref 0–0.2)
BASOPHILS NFR BLD AUTO: 0.4 % (ref 0–1.5)
DEPRECATED RDW RBC AUTO: 45.1 FL (ref 37–54)
EOSINOPHIL # BLD AUTO: 0.63 10*3/MM3 (ref 0–0.4)
EOSINOPHIL NFR BLD AUTO: 6.1 % (ref 0.3–6.2)
ERYTHROCYTE [DISTWIDTH] IN BLOOD BY AUTOMATED COUNT: 17.4 % (ref 12.3–15.4)
FERRITIN SERPL-MCNC: 920.2 NG/ML (ref 30–400)
HCT VFR BLD AUTO: 37.1 % (ref 37.5–51)
HGB BLD-MCNC: 11 G/DL (ref 13–17.7)
HYPOCHROMIA BLD QL: NORMAL
IMM GRANULOCYTES # BLD AUTO: 0.06 10*3/MM3 (ref 0–0.05)
IMM GRANULOCYTES NFR BLD AUTO: 0.6 % (ref 0–0.5)
IRON 24H UR-MRATE: 13 MCG/DL (ref 59–158)
IRON SATN MFR SERPL: 5 % (ref 20–50)
LYMPHOCYTES # BLD AUTO: 0.97 10*3/MM3 (ref 0.7–3.1)
LYMPHOCYTES NFR BLD AUTO: 9.3 % (ref 19.6–45.3)
MCH RBC QN AUTO: 21.7 PG (ref 26.6–33)
MCHC RBC AUTO-ENTMCNC: 29.6 G/DL (ref 31.5–35.7)
MCV RBC AUTO: 73.3 FL (ref 79–97)
MICROCYTES BLD QL: NORMAL
MONOCYTES # BLD AUTO: 1.28 10*3/MM3 (ref 0.1–0.9)
MONOCYTES NFR BLD AUTO: 12.3 % (ref 5–12)
NEUTROPHILS NFR BLD AUTO: 7.41 10*3/MM3 (ref 1.7–7)
NEUTROPHILS NFR BLD AUTO: 71.3 % (ref 42.7–76)
NRBC BLD AUTO-RTO: 0 /100 WBC (ref 0–0.2)
PLATELET # BLD AUTO: 377 10*3/MM3 (ref 140–450)
PMV BLD AUTO: 10.4 FL (ref 6–12)
RBC # BLD AUTO: 5.06 10*6/MM3 (ref 4.14–5.8)
SMALL PLATELETS BLD QL SMEAR: ADEQUATE
TIBC SERPL-MCNC: 270 MCG/DL (ref 298–536)
TRANSFERRIN SERPL-MCNC: 181 MG/DL (ref 200–360)
WBC MORPH BLD: NORMAL
WBC NRBC COR # BLD: 10.39 10*3/MM3 (ref 3.4–10.8)

## 2022-09-07 PROCEDURE — 81257 HBA1/HBA2 GENE: CPT

## 2022-09-07 PROCEDURE — 83540 ASSAY OF IRON: CPT

## 2022-09-07 PROCEDURE — 82728 ASSAY OF FERRITIN: CPT

## 2022-09-07 PROCEDURE — 74177 CT ABD & PELVIS W/CONTRAST: CPT

## 2022-09-07 PROCEDURE — 84466 ASSAY OF TRANSFERRIN: CPT

## 2022-09-07 PROCEDURE — 85007 BL SMEAR W/DIFF WBC COUNT: CPT

## 2022-09-07 PROCEDURE — 71260 CT THORAX DX C+: CPT

## 2022-09-07 PROCEDURE — 25010000002 IOPAMIDOL 61 % SOLUTION: Performed by: INTERNAL MEDICINE

## 2022-09-07 PROCEDURE — 81364 HBB FULL GENE SEQUENCE: CPT

## 2022-09-07 PROCEDURE — 36415 COLL VENOUS BLD VENIPUNCTURE: CPT

## 2022-09-07 PROCEDURE — 85025 COMPLETE CBC W/AUTO DIFF WBC: CPT

## 2022-09-07 PROCEDURE — 83020 HEMOGLOBIN ELECTROPHORESIS: CPT

## 2022-09-07 RX ADMIN — IOPAMIDOL 100 ML: 612 INJECTION, SOLUTION INTRAVENOUS at 14:55

## 2022-09-08 ENCOUNTER — TELEPHONE (OUTPATIENT)
Dept: ONCOLOGY | Facility: CLINIC | Age: 47
End: 2022-09-08

## 2022-09-08 DIAGNOSIS — R59.1 LYMPHADENOPATHY: Primary | ICD-10-CM

## 2022-09-08 LAB
HGB A MFR BLD ELPH: 97.9 % (ref 96.4–98.8)
HGB A2 MFR BLD ELPH: 2.1 % (ref 1.8–3.2)
HGB F MFR BLD ELPH: 0 % (ref 0–2)
HGB FRACT BLD-IMP: NORMAL
HGB S MFR BLD ELPH: 0 %

## 2022-09-08 NOTE — TELEPHONE ENCOUNTER
Called patient re: ct scan results.  This shows fairly extensive upper mediastinal and supraclavicular adenopathy, measuring up to 3.8 cm.    He reports he was having some fever and tested positive for covid19 yesterday.    I spoke with Dr. Isaura Alonzo to request excisional biopsy of LN.  We will get him scheduled to see her, likely the week after next after his quarantine period has ended.

## 2022-09-13 ENCOUNTER — TELEPHONE (OUTPATIENT)
Dept: ONCOLOGY | Facility: CLINIC | Age: 47
End: 2022-09-13

## 2022-09-13 NOTE — TELEPHONE ENCOUNTER
Caller: JOSÉ    Relationship: NURSE  [facility]    Best call back number: [phone]     What is the best time to reach you: ANYTIME    Who are you requesting to speak with (clinical staff, provider,  specific staff member): DR GONZALEZ OR NURSE    What was the call regarding: JOSÉ WAS JUST CALLING TO CONF THAT PT HAD HIS CT SCANS AND WANTED AN UPDATE ON RESULTS. CAN FAX OR CALL.    FAX NUMBER: [phone]    Do you require a callback: CALL OR FAX

## 2022-09-14 LAB — HBA1 GENE MUT ANL BLD/T: NORMAL

## 2022-09-16 LAB — HBB GENE MUT ANL BLD/T: NORMAL

## 2022-09-19 NOTE — PROGRESS NOTES
Subjective   William Maria is a 46 y.o. male.   Chief Complaint   Patient presents with   • Mass     Lymphoma          History of Present Illness   Mr. Maria is a 46-year-old gentleman referred by Dr. Tamie Mccullough from oncology to be evaluated for excisional lymph node biopsy for further investigation of microcytic anemia, weight loss, elevated inflammatory markers, with recent imaging demonstrating significant mediastinal and supraclavicular adenopathy concerning for lymphoma.  He previously underwent a bone marrow biopsy in August 2022 which demonstrated normocellular bone marrow with maturing trilineage hematopoiesis, increased stainable marrow iron, no dysplasia, fibrosis, or blasts.  Labs did not demonstrate any nutrient deficiency, or heavy metal toxicity.  CT imaging of the chest, abdomen, and pelvis on 9/7/2022 demonstrated extensive intrathoracic and supraclavicular adenopathy with lymph nodes measuring up to 3.8 cm.        The following portions of the patient's history were reviewed and updated as appropriate: allergies, current medications, past family history, past medical history, past social history, past surgical history and problem list.     Patient Active Problem List   Diagnosis   • Iron deficiency anemia   • Anemia   • Weight loss   • Leukocytosis   • Malaise and fatigue   • Lymphadenopathy, supraclavicular       Past Medical History:   Diagnosis Date   • Anemia    • Cough    • COVID-19        Past Surgical History:   Procedure Laterality Date   • BONE MARROW BIOPSY  08/12/2022    Adin Molina   • COLONOSCOPY N/A 06/24/2022    Procedure: COLONOSCOPY with biopsy and polypectomy;  Surgeon: Lc Steinberg MD;  Location: [facility] ENDOSCOPY;  Service: Gastroenterology;  Laterality: N/A;   • ENDOSCOPY N/A 06/24/2022    Procedure: ESOPHAGOGASTRODUODENOSCOPY with biopsy;  Surgeon: Lc Steinberg MD;  Location: [facility] ENDOSCOPY;  Service: Gastroenterology;  Laterality: N/A;        Medications:   No current facility-administered medications on file prior to visit.     Current Outpatient Medications on File Prior to Visit   Medication Sig   • albuterol sulfate  (90 Base) MCG/ACT inhaler Inhale 2 puffs As Needed for Wheezing.   • triamcinolone (KENALOG) 0.1 % cream Daily As Needed.       Allergies:   No Known Allergies      Family History   Problem Relation Age of Onset   • No Known Problems Mother    • No Known Problems Father    • Diverticulitis Maternal Grandmother    • Colon cancer Paternal Great-Grandfather        Social History     Socioeconomic History   • Marital status:    Tobacco Use   • Smoking status: Never Smoker   • Smokeless tobacco: Never Used   Vaping Use   • Vaping Use: Never used   Substance and Sexual Activity   • Alcohol use: Never   • Drug use: Never   • Sexual activity: Defer         Review of Systems   Constitutional: Negative for activity change, chills, fever and unexpected weight change.   HENT: Negative for hearing loss, trouble swallowing and voice change.    Eyes: Negative for visual disturbance.   Respiratory: Negative for apnea, cough, chest tightness, shortness of breath and wheezing.    Cardiovascular: Negative for chest pain, palpitations and leg swelling.   Gastrointestinal: Negative for abdominal distention, abdominal pain, anal bleeding, blood in stool, constipation, diarrhea, nausea, rectal pain and vomiting.   Endocrine: Negative for cold intolerance and heat intolerance.   Genitourinary: Negative for difficulty urinating, dysuria and flank pain.   Musculoskeletal: Negative for back pain and gait problem.   Skin: Negative for color change, rash and wound.   Neurological: Negative for dizziness, syncope, speech difficulty, weakness, light-headedness, numbness and headaches.   Hematological: Negative for adenopathy. Does not bruise/bleed easily.   Psychiatric/Behavioral: Negative for confusion. The patient is not nervous/anxious.   "      Objective    /76   Pulse 95   Temp 98.9 °F (37.2 °C) (Temporal)   Resp 16   Ht 188 cm (74\")   Wt 92.2 kg (203 lb 3.2 oz)   SpO2 99%   BMI 26.09 kg/m²     Physical Exam  Constitutional:       Appearance: He is well-developed.   HENT:      Head: Normocephalic and atraumatic.   Neck:      Comments: Supraclavicular adenopathy L>R  Cardiovascular:      Rate and Rhythm: Regular rhythm.   Pulmonary:      Effort: Pulmonary effort is normal.   Abdominal:      General: There is no distension.      Palpations: Abdomen is soft.      Tenderness: There is no abdominal tenderness.   Musculoskeletal:      Cervical back: Neck supple.   Skin:     General: Skin is warm and dry.   Neurological:      Mental Status: He is alert and oriented to person, place, and time.   Psychiatric:         Behavior: Behavior normal.         Results Review:  I have personally reviewed the relevant patient imaging.     CT SCAN OF THE CHEST, ABDOMEN AND PELVIS WITH CONTRAST    09/07/2022  2:52 PM      HISTORY:   weight loss; R63.4-Abnormal weight loss .     PROCEDURE:   Axial CT images were obtained from the lung apex to the pubic symphysis  following IV contrast administration. Coronal and sagittal reformatted  images were generated from the axial data set and provided for  interpretation. This study was performed with techniques to keep  radiation doses as low as reasonably achievable, (ALARA). Individualized  dose reduction techniques using automated exposure control or adjustment  of mA and/or kV according to the patient size were employed. 1426.60  mGy.cm     COMPARISON:   Abdominal ultrasound 06/17/2022.     FINDINGS:      CHEST:   Lungs/Pleura:  Clear lungs without suspicious pulmonary nodules or consolidation.  No  pneumothorax or pleural effusion.     Vessels:  No large central pulmonary embolus. Thoracic aorta is normal in caliber.  No significant coronary artery calcifications.     Heart/mediastinum:  The heart is normal in " size. No pericardial effusion.      Lymph nodes:  Extensive intrathoracic and supraclavicular lymphadenopathy with  multiple lymph juan conglomerates, largest superior mediastinal lymph  node measuring up to 3.8 cm (series 2, images 26) and left  supraclavicular lymph node measuring up to 2.8 cm (series 2, image 5,  and the right supraclavicular lymph node measuring up to 2.7 cm (series  2, image 10).     Chest wall:  No acute findings.     Bones:  No acute fracture. No aggressive osseous sclerotic or lucent lesions.     ABDOMEN/PELVIS:  Liver, gallbladder and bile ducts:  The liver demonstrates homogeneous enhancement. There are scattered  multiple indeterminate hypodense lesions throughout the liver, most  likely benign finding such as cysts. Unremarkable gallbladder. No  biliary ductal dilatation.      Adrenal glands:  The adrenal glands are morphologically unremarkable without suspicious  lesion.     Kidneys, ureters and urinary bladder:  No suspicious renal lesions. No hydronephrosis. Unremarkable urinary  bladder.     Spleen:  Mild splenomegaly, measuring up to 14 cm in craniocaudal dimension.     Pancreas:  The pancreas is unremarkable.      Gastrointestinal system and mesentery:  There is no evidence of bowel obstruction. The appendix is visualized  and unremarkable. No significant mesenteric inflammation.     Lymph nodes:  No pathologically enlarged abdominal or pelvic lymph nodes are present.     Vessels:  The abdominal aorta is normal in caliber. The celiac trunk, superior  mesenteric artery, inferior mesenteric artery and their branch vessels  appear grossly patent. The superior mesenteric vein, splenic vein and  main portal veins are patent. The inferior vena cava is unremarkable.     Peritoneum:  No free intraperitoneal fluid or pneumoperitoneum.     Pelvic viscera:  No acute findings.     Body wall:  No acute findings. No significant body wall hernias.     Bones:  No acute fracture. No aggressive  osseous sclerotic or lucent lesions.     IMPRESSION:  Extensive intrathoracic and bilateral supraclavicular lymphadenopathy.  Findings are concerning for most likely lymphoma. Other differential  considerations include metastasis, however no primary identified on this  exam. The supraclavicular lymph nodes appears to be amenable for biopsy.     No intra-abdominal or pelvic lymphadenopathy identified. Mild  splenomegaly, measuring up to 14 cm in craniocaudal dimension.     Indeterminate multiple hypodense lesions scattered throughout the liver,  most likely benign findings such as cysts. Possible metastatic lesions  cannot be excluded. Correlate clinically.     Consider further evaluation with PET/CT and referral to tumor board.           Images personally reviewed, interpreted and dictated by LORENZO Brizuela.                       This report was signed and finalized on 9/8/2022 9:13 AM by LORENZO Brizuela.    Assessment & Plan   Diagnoses and all orders for this visit:    1. Lymphadenopathy, supraclavicular (Primary)  -     Case Request; Standing  -     Case Request    Other orders  -     Follow Anesthesia Guidelines / Standing Orders; Future  -     Obtain Informed Consent; Future  -     Provide NPO Instructions to Patient; Future  -     Chlorhexidine Skin Prep; Future      On exam, Mr. Maria has prominent supraclavicular adenopathy, left greater than right.  We discussed excisional biopsy of a supraclavicular lymph node in its entirety for further diagnostic investigation.  The target node is on the left as it is more superficial, more easily palpable, and slightly larger.  We discussed the risk, benefits, and alternatives associated with this procedure.  We also discussed the risks of bleeding, infection, neurovascular injury, lymphatic leak, and nondiagnostic specimens.  He understands, and wishes to proceed.  He understands that he will need to be n.p.o. after midnight evening prior to the  scheduled surgical procedure.  My office will assist him in making arrangements for scheduling, and preadmission testing.

## 2022-09-21 ENCOUNTER — OFFICE VISIT (OUTPATIENT)
Dept: SURGERY | Facility: CLINIC | Age: 47
End: 2022-09-21

## 2022-09-21 VITALS
HEIGHT: 74 IN | BODY MASS INDEX: 26.08 KG/M2 | TEMPERATURE: 98.9 F | DIASTOLIC BLOOD PRESSURE: 76 MMHG | RESPIRATION RATE: 16 BRPM | OXYGEN SATURATION: 99 % | HEART RATE: 95 BPM | WEIGHT: 203.2 LBS | SYSTOLIC BLOOD PRESSURE: 122 MMHG

## 2022-09-21 DIAGNOSIS — R59.0 LYMPHADENOPATHY, SUPRACLAVICULAR: Primary | ICD-10-CM

## 2022-09-21 PROCEDURE — 99203 OFFICE O/P NEW LOW 30 MIN: CPT | Performed by: SURGERY

## 2022-09-21 RX ORDER — ALBUTEROL SULFATE 90 UG/1
2 AEROSOL, METERED RESPIRATORY (INHALATION) AS NEEDED
COMMUNITY
End: 2022-12-14

## 2022-09-22 RX ORDER — SODIUM CHLORIDE 0.9 % (FLUSH) 0.9 %
10 SYRINGE (ML) INJECTION EVERY 12 HOURS SCHEDULED
Status: CANCELLED | OUTPATIENT
Start: 2022-09-22

## 2022-09-22 RX ORDER — SODIUM CHLORIDE, SODIUM LACTATE, POTASSIUM CHLORIDE, CALCIUM CHLORIDE 600; 310; 30; 20 MG/100ML; MG/100ML; MG/100ML; MG/100ML
50 INJECTION, SOLUTION INTRAVENOUS CONTINUOUS
Status: CANCELLED | OUTPATIENT
Start: 2022-09-22

## 2022-09-22 RX ORDER — HEPARIN SODIUM 5000 [USP'U]/ML
5000 INJECTION, SOLUTION INTRAVENOUS; SUBCUTANEOUS ONCE
Status: CANCELLED | OUTPATIENT
Start: 2022-09-22 | End: 2022-09-22

## 2022-09-22 RX ORDER — SODIUM CHLORIDE 0.9 % (FLUSH) 0.9 %
10 SYRINGE (ML) INJECTION AS NEEDED
Status: CANCELLED | OUTPATIENT
Start: 2022-09-22

## 2022-09-23 PROBLEM — R59.0 LYMPHADENOPATHY, SUPRACLAVICULAR: Status: ACTIVE | Noted: 2022-09-23

## 2022-09-27 ENCOUNTER — OFFICE VISIT (OUTPATIENT)
Dept: ONCOLOGY | Facility: CLINIC | Age: 47
End: 2022-09-27

## 2022-09-27 ENCOUNTER — TELEPHONE (OUTPATIENT)
Dept: SURGERY | Facility: CLINIC | Age: 47
End: 2022-09-27

## 2022-09-27 VITALS
BODY MASS INDEX: 25.8 KG/M2 | OXYGEN SATURATION: 99 % | TEMPERATURE: 98.6 F | WEIGHT: 201 LBS | SYSTOLIC BLOOD PRESSURE: 128 MMHG | HEART RATE: 90 BPM | DIASTOLIC BLOOD PRESSURE: 72 MMHG | HEIGHT: 74 IN

## 2022-09-27 DIAGNOSIS — R59.0 LYMPHADENOPATHY, SUPRACLAVICULAR: Primary | ICD-10-CM

## 2022-09-27 PROCEDURE — 99214 OFFICE O/P EST MOD 30 MIN: CPT | Performed by: INTERNAL MEDICINE

## 2022-09-27 NOTE — PROGRESS NOTES
"      PROBLEM LIST:  1.  Lymphadenopathy  A) bone marrow biopsy done for chronic anemia 8/12/2022 showed a normocellular bone marrow with maturing trilineage hematopoiesis, increased stainable marrow iron, no evidence of dysplasia, fibrosis, or increased blasts.  Labs showed no evidence of nutrient deficiency, or heavy metal toxicity.  B) CT chest abdomen pelvis on 9/7/2022 showed extensive intrathoracic and supraclavicular lymphadenopathy, largest superior mediastinal lymph node 3.8 cm, left supraclavicular lymph node 2.8 cm, right supraclavicular lymph node 2.7 cm.  Hypodense lesions in the liver most likely benign cysts.  2. Weight loss    Subjective     CHIEF COMPLAINT: anemia    HISTORY OF PRESENT ILLNESS:   William Maria returns for follow-up.  He presents to review his scan results.  He did meet with Dr. Alonzo and has a lymph node biopsy scheduled for Friday.      Objective      /72   Pulse 90   Temp 98.6 °F (37 °C)   Ht 188 cm (74\")   Wt 91.2 kg (201 lb)   SpO2 99%   BMI 25.81 kg/m²      Performance Status:0                General: well appearing male in no acute distress  Neuro: alert and oriented  HEENT: sclera anicteric, oropharynx clear  Lymphatics: Left supraclavicular lymph node, firm approximately 1.5 cm palpable  Extremeties: no lower extremity edema  Skin: Healing rash on left forearm  Psych: mood and affect appropriate          RECENT LABS:  Lab Results   Component Value Date    WBC 10.39 09/07/2022    HGB 11.0 (L) 09/07/2022    HCT 37.1 (L) 09/07/2022    MCV 73.3 (L) 09/07/2022     09/07/2022       Lab Results   Component Value Date    GLUCOSE 94 07/12/2022    BUN 15 07/12/2022    CREATININE 0.93 07/12/2022    BCR 16.1 07/12/2022    K 4.2 07/12/2022    CO2 25.7 07/12/2022    CALCIUM 9.3 07/12/2022    PROTENTOTREF 7.1 07/12/2022    ALBUMIN 3.70 07/12/2022    ALBUMIN 3.0 07/12/2022    LABIL2 0.8 07/12/2022    AST 18 07/12/2022    ALT 14 07/12/2022         CT Abdomen Pelvis With " Contrast, CT Chest With Contrast Diagnostic  Narrative: CT SCAN OF THE CHEST, ABDOMEN AND PELVIS WITH CONTRAST    09/07/2022  2:52 PM      HISTORY:   weight loss; R63.4-Abnormal weight loss .     PROCEDURE:   Axial CT images were obtained from the lung apex to the pubic symphysis  following IV contrast administration. Coronal and sagittal reformatted  images were generated from the axial data set and provided for  interpretation. This study was performed with techniques to keep  radiation doses as low as reasonably achievable, (ALARA). Individualized  dose reduction techniques using automated exposure control or adjustment  of mA and/or kV according to the patient size were employed. 1426.60  mGy.cm     COMPARISON:   Abdominal ultrasound 06/17/2022.     FINDINGS:      CHEST:   Lungs/Pleura:  Clear lungs without suspicious pulmonary nodules or consolidation.  No  pneumothorax or pleural effusion.     Vessels:  No large central pulmonary embolus. Thoracic aorta is normal in caliber.  No significant coronary artery calcifications.     Heart/mediastinum:  The heart is normal in size. No pericardial effusion.      Lymph nodes:  Extensive intrathoracic and supraclavicular lymphadenopathy with  multiple lymph juan conglomerates, largest superior mediastinal lymph  node measuring up to 3.8 cm (series 2, images 26) and left  supraclavicular lymph node measuring up to 2.8 cm (series 2, image 5,  and the right supraclavicular lymph node measuring up to 2.7 cm (series  2, image 10).     Chest wall:  No acute findings.     Bones:  No acute fracture. No aggressive osseous sclerotic or lucent lesions.     ABDOMEN/PELVIS:  Liver, gallbladder and bile ducts:  The liver demonstrates homogeneous enhancement. There are scattered  multiple indeterminate hypodense lesions throughout the liver, most  likely benign finding such as cysts. Unremarkable gallbladder. No  biliary ductal dilatation.      Adrenal glands:  The adrenal glands are  morphologically unremarkable without suspicious  lesion.     Kidneys, ureters and urinary bladder:  No suspicious renal lesions. No hydronephrosis. Unremarkable urinary  bladder.     Spleen:  Mild splenomegaly, measuring up to 14 cm in craniocaudal dimension.     Pancreas:  The pancreas is unremarkable.      Gastrointestinal system and mesentery:  There is no evidence of bowel obstruction. The appendix is visualized  and unremarkable. No significant mesenteric inflammation.     Lymph nodes:  No pathologically enlarged abdominal or pelvic lymph nodes are present.     Vessels:  The abdominal aorta is normal in caliber. The celiac trunk, superior  mesenteric artery, inferior mesenteric artery and their branch vessels  appear grossly patent. The superior mesenteric vein, splenic vein and  main portal veins are patent. The inferior vena cava is unremarkable.     Peritoneum:  No free intraperitoneal fluid or pneumoperitoneum.     Pelvic viscera:  No acute findings.     Body wall:  No acute findings. No significant body wall hernias.     Bones:  No acute fracture. No aggressive osseous sclerotic or lucent lesions.     Impression: Extensive intrathoracic and bilateral supraclavicular lymphadenopathy.  Findings are concerning for most likely lymphoma. Other differential  considerations include metastasis, however no primary identified on this  exam. The supraclavicular lymph nodes appears to be amenable for biopsy.     No intra-abdominal or pelvic lymphadenopathy identified. Mild  splenomegaly, measuring up to 14 cm in craniocaudal dimension.     Indeterminate multiple hypodense lesions scattered throughout the liver,  most likely benign findings such as cysts. Possible metastatic lesions  cannot be excluded. Correlate clinically.     Consider further evaluation with PET/CT and referral to tumor board.           Images personally reviewed, interpreted and dictated by LORENZO Brizuela.                       This report  was signed and finalized on 9/8/2022 9:13 AM by LORENZO Brizueal.    I personally reviewed the imaging studies          ASSESSMENT AND PLAN:     William Maria is a 46 y.o. male with microcytic anemia and weight loss, as well as elevated inflammatory markers.    Lymphadenopathy: I reviewed the imaging studies with the patient.  He has upper mediastinal and supraclavicular adenopathy but no other obvious disease elsewhere.  Given his presentation and imaging findings, lymphoma is high on the differential.  He has had a recent COVID-19 infection which can be associated with lymphadenopathy, but given the chronicity of his symptoms and persistence of lymph nodes on exam today I do not think this can be only attributed to the recent infection.  Biopsy is scheduled for Friday.  We will make plans regarding further imaging and testing/treatment based on these results.    I will tentatively put him on my schedule for next Tuesday afternoon in the hopes that we have pathology results by then.    Follow-up in about 1 week.          Total time of patient care on day of service including time prior to, face to face with patient, and following visit spent in reviewing records, lab results, imaging studies, discussion with patient, and documentation/charting was > 33 minutes.                    Tamie Mccullough MD  [facility]    9/27/2022          CC:

## 2022-09-30 ENCOUNTER — HOSPITAL ENCOUNTER (OUTPATIENT)
Facility: HOSPITAL | Age: 47
Setting detail: HOSPITAL OUTPATIENT SURGERY
Discharge: HOME | End: 2022-09-30
Attending: SURGERY | Admitting: SURGERY

## 2022-09-30 ENCOUNTER — ANESTHESIA EVENT (OUTPATIENT)
Dept: PERIOP | Facility: HOSPITAL | Age: 47
End: 2022-09-30

## 2022-09-30 ENCOUNTER — ANESTHESIA (OUTPATIENT)
Dept: PERIOP | Facility: HOSPITAL | Age: 47
End: 2022-09-30

## 2022-09-30 VITALS
HEART RATE: 83 BPM | OXYGEN SATURATION: 96 % | RESPIRATION RATE: 18 BRPM | TEMPERATURE: 97.6 F | DIASTOLIC BLOOD PRESSURE: 78 MMHG | SYSTOLIC BLOOD PRESSURE: 131 MMHG

## 2022-09-30 DIAGNOSIS — R59.0 LYMPHADENOPATHY, SUPRACLAVICULAR: ICD-10-CM

## 2022-09-30 PROCEDURE — 38510 BIOPSY/REMOVAL LYMPH NODES: CPT | Performed by: SURGERY

## 2022-09-30 PROCEDURE — 88342 IMHCHEM/IMCYTCHM 1ST ANTB: CPT

## 2022-09-30 PROCEDURE — 25010000002 HEPARIN (PORCINE) PER 1000 UNITS: Performed by: SURGERY

## 2022-09-30 PROCEDURE — 0 CEFAZOLIN SODIUM-DEXTROSE 2-3 GM-%(50ML) RECONSTITUTED SOLUTION: Performed by: SURGERY

## 2022-09-30 PROCEDURE — 25010000002 PROPOFOL 10 MG/ML EMULSION: Performed by: NURSE ANESTHETIST, CERTIFIED REGISTERED

## 2022-09-30 PROCEDURE — 25010000002 FENTANYL CITRATE (PF) 50 MCG/ML SOLUTION: Performed by: NURSE ANESTHETIST, CERTIFIED REGISTERED

## 2022-09-30 PROCEDURE — 88365 INSITU HYBRIDIZATION (FISH): CPT

## 2022-09-30 PROCEDURE — 88305 TISSUE EXAM BY PATHOLOGIST: CPT

## 2022-09-30 PROCEDURE — 25010000002 ONDANSETRON PER 1 MG: Performed by: NURSE ANESTHETIST, CERTIFIED REGISTERED

## 2022-09-30 PROCEDURE — 25010000002 MIDAZOLAM PER 1MG: Performed by: NURSE ANESTHETIST, CERTIFIED REGISTERED

## 2022-09-30 PROCEDURE — 0 LIDOCAINE 1 % SOLUTION: Performed by: SURGERY

## 2022-09-30 PROCEDURE — 25010000002 DEXAMETHASONE PER 1 MG: Performed by: NURSE ANESTHETIST, CERTIFIED REGISTERED

## 2022-09-30 PROCEDURE — 88341 IMHCHEM/IMCYTCHM EA ADD ANTB: CPT

## 2022-09-30 DEVICE — LIGACLIP MCA MULTIPLE CLIP APPLIERS, 20 SMALL CLIPS
Type: IMPLANTABLE DEVICE | Site: NECK | Status: FUNCTIONAL
Brand: LIGACLIP

## 2022-09-30 RX ORDER — HYDROCODONE BITARTRATE AND ACETAMINOPHEN 5; 325 MG/1; MG/1
1 TABLET ORAL EVERY 4 HOURS PRN
Qty: 8 TABLET | Refills: 0 | Status: SHIPPED | OUTPATIENT
Start: 2022-09-30

## 2022-09-30 RX ORDER — ONDANSETRON 2 MG/ML
4 INJECTION INTRAMUSCULAR; INTRAVENOUS ONCE AS NEEDED
Status: DISCONTINUED | OUTPATIENT
Start: 2022-09-30 | End: 2022-09-30 | Stop reason: HOSPADM

## 2022-09-30 RX ORDER — BUPIVACAINE HYDROCHLORIDE 2.5 MG/ML
INJECTION, SOLUTION EPIDURAL; INFILTRATION; INTRACAUDAL AS NEEDED
Status: DISCONTINUED | OUTPATIENT
Start: 2022-09-30 | End: 2022-09-30 | Stop reason: HOSPADM

## 2022-09-30 RX ORDER — SODIUM CHLORIDE, SODIUM LACTATE, POTASSIUM CHLORIDE, CALCIUM CHLORIDE 600; 310; 30; 20 MG/100ML; MG/100ML; MG/100ML; MG/100ML
50 INJECTION, SOLUTION INTRAVENOUS CONTINUOUS
Status: DISCONTINUED | OUTPATIENT
Start: 2022-09-30 | End: 2022-09-30 | Stop reason: HOSPADM

## 2022-09-30 RX ORDER — DEXAMETHASONE SODIUM PHOSPHATE 4 MG/ML
INJECTION, SOLUTION INTRA-ARTICULAR; INTRALESIONAL; INTRAMUSCULAR; INTRAVENOUS; SOFT TISSUE AS NEEDED
Status: DISCONTINUED | OUTPATIENT
Start: 2022-09-30 | End: 2022-09-30 | Stop reason: SURG

## 2022-09-30 RX ORDER — ONDANSETRON 2 MG/ML
INJECTION INTRAMUSCULAR; INTRAVENOUS AS NEEDED
Status: DISCONTINUED | OUTPATIENT
Start: 2022-09-30 | End: 2022-09-30 | Stop reason: SURG

## 2022-09-30 RX ORDER — HEPARIN SODIUM 5000 [USP'U]/ML
5000 INJECTION, SOLUTION INTRAVENOUS; SUBCUTANEOUS ONCE
Status: COMPLETED | OUTPATIENT
Start: 2022-09-30 | End: 2022-09-30

## 2022-09-30 RX ORDER — MIDAZOLAM HYDROCHLORIDE 2 MG/2ML
INJECTION, SOLUTION INTRAMUSCULAR; INTRAVENOUS AS NEEDED
Status: DISCONTINUED | OUTPATIENT
Start: 2022-09-30 | End: 2022-09-30 | Stop reason: SURG

## 2022-09-30 RX ORDER — LIDOCAINE HYDROCHLORIDE 20 MG/ML
INJECTION, SOLUTION INTRAVENOUS AS NEEDED
Status: DISCONTINUED | OUTPATIENT
Start: 2022-09-30 | End: 2022-09-30 | Stop reason: SURG

## 2022-09-30 RX ORDER — KETAMINE HYDROCHLORIDE 50 MG/ML
INJECTION, SOLUTION, CONCENTRATE INTRAMUSCULAR; INTRAVENOUS AS NEEDED
Status: DISCONTINUED | OUTPATIENT
Start: 2022-09-30 | End: 2022-09-30 | Stop reason: SURG

## 2022-09-30 RX ORDER — SODIUM CHLORIDE 0.9 % (FLUSH) 0.9 %
10 SYRINGE (ML) INJECTION EVERY 12 HOURS SCHEDULED
Status: DISCONTINUED | OUTPATIENT
Start: 2022-09-30 | End: 2022-09-30 | Stop reason: HOSPADM

## 2022-09-30 RX ORDER — LIDOCAINE HYDROCHLORIDE 10 MG/ML
INJECTION, SOLUTION INFILTRATION; PERINEURAL AS NEEDED
Status: DISCONTINUED | OUTPATIENT
Start: 2022-09-30 | End: 2022-09-30 | Stop reason: HOSPADM

## 2022-09-30 RX ORDER — HYDROCODONE BITARTRATE AND ACETAMINOPHEN 7.5; 325 MG/1; MG/1
1 TABLET ORAL ONCE AS NEEDED
Status: DISCONTINUED | OUTPATIENT
Start: 2022-09-30 | End: 2022-09-30 | Stop reason: HOSPADM

## 2022-09-30 RX ORDER — MAGNESIUM HYDROXIDE 1200 MG/15ML
LIQUID ORAL AS NEEDED
Status: DISCONTINUED | OUTPATIENT
Start: 2022-09-30 | End: 2022-09-30 | Stop reason: HOSPADM

## 2022-09-30 RX ORDER — FENTANYL CITRATE 50 UG/ML
INJECTION, SOLUTION INTRAMUSCULAR; INTRAVENOUS AS NEEDED
Status: DISCONTINUED | OUTPATIENT
Start: 2022-09-30 | End: 2022-09-30 | Stop reason: SURG

## 2022-09-30 RX ORDER — SODIUM CHLORIDE 0.9 % (FLUSH) 0.9 %
10 SYRINGE (ML) INJECTION AS NEEDED
Status: DISCONTINUED | OUTPATIENT
Start: 2022-09-30 | End: 2022-09-30 | Stop reason: HOSPADM

## 2022-09-30 RX ORDER — CEFAZOLIN SODIUM 2 G/50ML
2 SOLUTION INTRAVENOUS ONCE
Status: COMPLETED | OUTPATIENT
Start: 2022-09-30 | End: 2022-09-30

## 2022-09-30 RX ADMIN — KETAMINE HYDROCHLORIDE 50 MG: 50 INJECTION, SOLUTION INTRAMUSCULAR; INTRAVENOUS at 16:07

## 2022-09-30 RX ADMIN — LIDOCAINE HYDROCHLORIDE 60 MG: 20 INJECTION, SOLUTION INTRAVENOUS at 15:54

## 2022-09-30 RX ADMIN — ONDANSETRON 4 MG: 2 INJECTION INTRAMUSCULAR; INTRAVENOUS at 15:54

## 2022-09-30 RX ADMIN — KETAMINE HYDROCHLORIDE 25 MG: 50 INJECTION, SOLUTION INTRAMUSCULAR; INTRAVENOUS at 15:54

## 2022-09-30 RX ADMIN — DEXAMETHASONE SODIUM PHOSPHATE 4 MG: 4 INJECTION, SOLUTION INTRAMUSCULAR; INTRAVENOUS at 15:54

## 2022-09-30 RX ADMIN — CEFAZOLIN SODIUM 2 G: 2 SOLUTION INTRAVENOUS at 16:02

## 2022-09-30 RX ADMIN — MIDAZOLAM HYDROCHLORIDE 2 MG: 1 INJECTION, SOLUTION INTRAMUSCULAR; INTRAVENOUS at 15:54

## 2022-09-30 RX ADMIN — FENTANYL CITRATE 100 MCG: 50 INJECTION INTRAMUSCULAR; INTRAVENOUS at 15:54

## 2022-09-30 RX ADMIN — SODIUM CHLORIDE, POTASSIUM CHLORIDE, SODIUM LACTATE AND CALCIUM CHLORIDE 50 ML/HR: 600; 310; 30; 20 INJECTION, SOLUTION INTRAVENOUS at 12:09

## 2022-09-30 RX ADMIN — PROPOFOL 140 MCG/KG/MIN: 10 INJECTION, EMULSION INTRAVENOUS at 15:54

## 2022-09-30 RX ADMIN — HEPARIN SODIUM 5000 UNITS: 5000 INJECTION INTRAVENOUS; SUBCUTANEOUS at 15:43

## 2022-09-30 NOTE — ANESTHESIA POSTPROCEDURE EVALUATION
Patient: William Maria    Procedure Summary     Date: 09/30/22 Room / Location: [facility] OR 3 /  [facility]    Anesthesia Start: 1552 Anesthesia Stop: 1652    Procedure: BIOPSY SUPRACLAVICULAR LYMPH NODE (Left ) Diagnosis:       Lymphadenopathy, supraclavicular      (Lymphadenopathy, supraclavicular [R59.0])    Surgeons: Isaura Alonzo MD Provider: Aneesh Burgos CRNA    Anesthesia Type: MAC ASA Status: 1          Anesthesia Type: MAC    Vitals  No vitals data found for the desired time range.          Post Anesthesia Care and Evaluation    Patient location during evaluation: bedside  Patient participation: complete - patient participated  Level of consciousness: awake  Pain score: 0  Pain management: adequate    Airway patency: patent  Anesthetic complications: No anesthetic complications  PONV Status: controlled  Cardiovascular status: acceptable and stable  Respiratory status: acceptable and room air  Hydration status: acceptable    Comments: Vital signs as noted in nursing documentation as per protocol

## 2022-09-30 NOTE — ANESTHESIA PREPROCEDURE EVALUATION
Anesthesia Evaluation     Patient summary reviewed and Nursing notes reviewed   no history of anesthetic complications:  NPO Solid Status: > 8 hours  NPO Liquid Status: > 8 hours           Airway   Mallampati: I  TM distance: >3 FB  Neck ROM: full  Possible difficult intubation  Dental - normal exam     Pulmonary - negative pulmonary ROS and normal exam   (-) not a smoker  Cardiovascular - negative cardio ROS and normal exam        Neuro/Psych- negative ROS  GI/Hepatic/Renal/Endo - negative ROS     Musculoskeletal (-) negative ROS    Abdominal    Substance History - negative use  (-) alcohol use, drug use     OB/GYN negative ob/gyn ROS         Other   blood dyscrasia anemia,                       Anesthesia Plan    ASA 1     MAC     (Risks and benefits discussed including risk of aspiration, recall and dental damage. All patient questions answered.    Will continue with plan of care.)  intravenous induction     Anesthetic plan, risks, benefits, and alternatives have been provided, discussed and informed consent has been obtained with: patient.  Pre-procedure education provided  Plan discussed with CRNA.        CODE STATUS:

## 2022-10-03 ENCOUNTER — TELEPHONE (OUTPATIENT)
Dept: ONCOLOGY | Facility: CLINIC | Age: 47
End: 2022-10-03

## 2022-10-03 NOTE — TELEPHONE ENCOUNTER
Caller: JOSÉ    Relationship: [employer]    Best call back number: [phone]    What is the best time to reach you: ANY    Who are you requesting to speak with (clinical staff, provider,  specific staff member): CLINICAL STAFF    What was the call regarding: REQUESTING A CLINICAL UPDATE ON PT FROM HIS VISIT ON 09/27/22.     Do you require a callback: YES OR UPDATE CAN BE FAXED TO HER    FX:[phone]

## 2022-10-04 ENCOUNTER — OFFICE VISIT (OUTPATIENT)
Dept: ONCOLOGY | Facility: CLINIC | Age: 47
End: 2022-10-04

## 2022-10-04 VITALS
TEMPERATURE: 98.9 F | OXYGEN SATURATION: 99 % | HEART RATE: 85 BPM | BODY MASS INDEX: 25.8 KG/M2 | HEIGHT: 74 IN | DIASTOLIC BLOOD PRESSURE: 71 MMHG | WEIGHT: 201 LBS | SYSTOLIC BLOOD PRESSURE: 133 MMHG | RESPIRATION RATE: 12 BRPM

## 2022-10-04 DIAGNOSIS — C85.92 LYMPHOMA OF INTRATHORACIC LYMPH NODES, UNSPECIFIED LYMPHOMA TYPE: ICD-10-CM

## 2022-10-04 DIAGNOSIS — R59.0 LYMPHADENOPATHY, SUPRACLAVICULAR: Primary | ICD-10-CM

## 2022-10-04 DIAGNOSIS — T45.1X5A CARDIOMYOPATHY DUE TO ANTHRACYCLINE: ICD-10-CM

## 2022-10-04 DIAGNOSIS — I42.7 CARDIOMYOPATHY DUE TO ANTHRACYCLINE: ICD-10-CM

## 2022-10-04 PROCEDURE — 99215 OFFICE O/P EST HI 40 MIN: CPT | Performed by: INTERNAL MEDICINE

## 2022-10-04 NOTE — PROGRESS NOTES
PROBLEM LIST:  1.  Lymphadenopathy  A) bone marrow biopsy done for chronic anemia 8/12/2022 showed a normocellular bone marrow with maturing trilineage hematopoiesis, increased stainable marrow iron, no evidence of dysplasia, fibrosis, or increased blasts.  Labs showed no evidence of nutrient deficiency, or heavy metal toxicity.  B) CT chest abdomen pelvis on 9/7/2022 showed extensive intrathoracic and supraclavicular lymphadenopathy, largest superior mediastinal lymph node 3.8 cm, left supraclavicular lymph node 2.8 cm, right supraclavicular lymph node 2.7 cm.  Hypodense lesions in the liver most likely benign cysts.  2. Weight loss    Subjective     CHIEF COMPLAINT: anemia    HISTORY OF PRESENT ILLNESS:   William Maria returns for follow-up.       Objective      There were no vitals taken for this visit.     Performance Status:0                General: well appearing male in no acute distress  Neuro: alert and oriented  HEENT: sclera anicteric, oropharynx clear  Lymphatics: Left supraclavicular lymph node, firm approximately 1.5 cm palpable  Extremeties: no lower extremity edema  Skin: Healing rash on left forearm  Psych: mood and affect appropriate          RECENT LABS:            CT Abdomen Pelvis With Contrast, CT Chest With Contrast Diagnostic  Narrative: CT SCAN OF THE CHEST, ABDOMEN AND PELVIS WITH CONTRAST    09/07/2022  2:52 PM      HISTORY:   weight loss; R63.4-Abnormal weight loss .     PROCEDURE:   Axial CT images were obtained from the lung apex to the pubic symphysis  following IV contrast administration. Coronal and sagittal reformatted  images were generated from the axial data set and provided for  interpretation. This study was performed with techniques to keep  radiation doses as low as reasonably achievable, (ALARA). Individualized  dose reduction techniques using automated exposure control or adjustment  of mA and/or kV according to the patient size were employed. 1426.60  mGy.cm      COMPARISON:   Abdominal ultrasound 06/17/2022.     FINDINGS:      CHEST:   Lungs/Pleura:  Clear lungs without suspicious pulmonary nodules or consolidation.  No  pneumothorax or pleural effusion.     Vessels:  No large central pulmonary embolus. Thoracic aorta is normal in caliber.  No significant coronary artery calcifications.     Heart/mediastinum:  The heart is normal in size. No pericardial effusion.      Lymph nodes:  Extensive intrathoracic and supraclavicular lymphadenopathy with  multiple lymph juan conglomerates, largest superior mediastinal lymph  node measuring up to 3.8 cm (series 2, images 26) and left  supraclavicular lymph node measuring up to 2.8 cm (series 2, image 5,  and the right supraclavicular lymph node measuring up to 2.7 cm (series  2, image 10).     Chest wall:  No acute findings.     Bones:  No acute fracture. No aggressive osseous sclerotic or lucent lesions.     ABDOMEN/PELVIS:  Liver, gallbladder and bile ducts:  The liver demonstrates homogeneous enhancement. There are scattered  multiple indeterminate hypodense lesions throughout the liver, most  likely benign finding such as cysts. Unremarkable gallbladder. No  biliary ductal dilatation.      Adrenal glands:  The adrenal glands are morphologically unremarkable without suspicious  lesion.     Kidneys, ureters and urinary bladder:  No suspicious renal lesions. No hydronephrosis. Unremarkable urinary  bladder.     Spleen:  Mild splenomegaly, measuring up to 14 cm in craniocaudal dimension.     Pancreas:  The pancreas is unremarkable.      Gastrointestinal system and mesentery:  There is no evidence of bowel obstruction. The appendix is visualized  and unremarkable. No significant mesenteric inflammation.     Lymph nodes:  No pathologically enlarged abdominal or pelvic lymph nodes are present.     Vessels:  The abdominal aorta is normal in caliber. The celiac trunk, superior  mesenteric artery, inferior mesenteric artery and their  branch vessels  appear grossly patent. The superior mesenteric vein, splenic vein and  main portal veins are patent. The inferior vena cava is unremarkable.     Peritoneum:  No free intraperitoneal fluid or pneumoperitoneum.     Pelvic viscera:  No acute findings.     Body wall:  No acute findings. No significant body wall hernias.     Bones:  No acute fracture. No aggressive osseous sclerotic or lucent lesions.     Impression: Extensive intrathoracic and bilateral supraclavicular lymphadenopathy.  Findings are concerning for most likely lymphoma. Other differential  considerations include metastasis, however no primary identified on this  exam. The supraclavicular lymph nodes appears to be amenable for biopsy.     No intra-abdominal or pelvic lymphadenopathy identified. Mild  splenomegaly, measuring up to 14 cm in craniocaudal dimension.     Indeterminate multiple hypodense lesions scattered throughout the liver,  most likely benign findings such as cysts. Possible metastatic lesions  cannot be excluded. Correlate clinically.     Consider further evaluation with PET/CT and referral to tumor board.           Images personally reviewed, interpreted and dictated by LORENZO Brizuela.                       This report was signed and finalized on 9/8/2022 9:13 AM by LORENZO Brizuela.    I personally reviewed the imaging studies          ASSESSMENT AND PLAN:     William Maria is a 46 y.o. male with microcytic anemia and weight loss, as well as elevated inflammatory markers.    Lymphadenopathy: I have spoken with pathology, and so far this does appear to be a lymphoma.  Hodgkin lymphoma is the most likely but awaiting confirmatory tests.  We discussed that treatment for Hodgkin as well as other types of lymphoma typically involves chemotherapy.  I will go ahead and order a staging PET scan, echocardiogram, and pulmonary function tests.  If this is Hodgkin lymphoma he would be classified as a stage IIb-II, with  B symptoms based on his weight loss of 40 pounds, and sed rate of 55.  This would be stage IIb unfavorable, and we will likely plan to start treatment with ABVD for 2 cycles followed by interim PET scan.    He and his wife state that they do not wish to pursue fertility preservation.    We will hope to start treatment within the next 2 weeks.  I will asked Dr. Alonzo to place a port prior to starting treatment.    Follow-up in about 1 week.          Total time of patient care on day of service including time prior to, face to face with patient, and following visit spent in reviewing records, lab results, imaging studies, discussion with patient, and documentation/charting was > 46 minutes.                    Tamie Mccullough MD  [facility] On[facility]    10/4/2022          CC:

## 2022-10-05 ENCOUNTER — TELEPHONE (OUTPATIENT)
Dept: ONCOLOGY | Facility: CLINIC | Age: 47
End: 2022-10-05

## 2022-10-05 PROBLEM — C81.04: Status: ACTIVE | Noted: 2022-10-05

## 2022-10-05 LAB — REF LAB TEST METHOD: NORMAL

## 2022-10-07 ENCOUNTER — HOSPITAL ENCOUNTER (OUTPATIENT)
Dept: PULMONOLOGY | Facility: HOSPITAL | Age: 47
Discharge: HOME | End: 2022-10-07

## 2022-10-07 ENCOUNTER — HOSPITAL ENCOUNTER (OUTPATIENT)
Dept: CARDIOLOGY | Facility: HOSPITAL | Age: 47
Discharge: HOME | End: 2022-10-07

## 2022-10-07 VITALS — WEIGHT: 201.06 LBS | HEIGHT: 74 IN | BODY MASS INDEX: 25.8 KG/M2

## 2022-10-07 DIAGNOSIS — I42.7 CARDIOMYOPATHY DUE TO ANTHRACYCLINE: ICD-10-CM

## 2022-10-07 DIAGNOSIS — C85.92 LYMPHOMA OF INTRATHORACIC LYMPH NODES, UNSPECIFIED LYMPHOMA TYPE: ICD-10-CM

## 2022-10-07 DIAGNOSIS — T45.1X5A CARDIOMYOPATHY DUE TO ANTHRACYCLINE: ICD-10-CM

## 2022-10-07 LAB
BH CV ECHO LEFT VENTRICLE GLOBAL LONGITUDINAL STRAIN: -18.9 %
BH CV ECHO MEAS - AO MAX PG: 5.6 MMHG
BH CV ECHO MEAS - AO MEAN PG: 3 MMHG
BH CV ECHO MEAS - AO ROOT DIAM: 3.3 CM
BH CV ECHO MEAS - AO V2 MAX: 118 CM/SEC
BH CV ECHO MEAS - AO V2 VTI: 22.8 CM
BH CV ECHO MEAS - AVA(I,D): 2.6 CM2
BH CV ECHO MEAS - EDV(CUBED): 157.5 ML
BH CV ECHO MEAS - EDV(MOD-SP2): 76.5 ML
BH CV ECHO MEAS - EDV(MOD-SP4): 112 ML
BH CV ECHO MEAS - EF(MOD-BP): 57.3 %
BH CV ECHO MEAS - EF(MOD-SP2): 61.6 %
BH CV ECHO MEAS - EF(MOD-SP4): 57.5 %
BH CV ECHO MEAS - ESV(CUBED): 59.3 ML
BH CV ECHO MEAS - ESV(MOD-SP2): 29.4 ML
BH CV ECHO MEAS - ESV(MOD-SP4): 47.6 ML
BH CV ECHO MEAS - FS: 27.8 %
BH CV ECHO MEAS - IVS/LVPW: 1 CM
BH CV ECHO MEAS - IVSD: 0.8 CM
BH CV ECHO MEAS - LA DIMENSION: 3.6 CM
BH CV ECHO MEAS - LAT PEAK E' VEL: 15.6 CM/SEC
BH CV ECHO MEAS - LV DIASTOLIC VOL/BSA (35-75): 51.4 CM2
BH CV ECHO MEAS - LV MASS(C)D: 155 GRAMS
BH CV ECHO MEAS - LV MAX PG: 3.9 MMHG
BH CV ECHO MEAS - LV MEAN PG: 2 MMHG
BH CV ECHO MEAS - LV SYSTOLIC VOL/BSA (12-30): 21.9 CM2
BH CV ECHO MEAS - LV V1 MAX: 98.5 CM/SEC
BH CV ECHO MEAS - LV V1 VTI: 19 CM
BH CV ECHO MEAS - LVIDD: 5.4 CM
BH CV ECHO MEAS - LVIDS: 3.9 CM
BH CV ECHO MEAS - LVOT AREA: 3.1 CM2
BH CV ECHO MEAS - LVOT DIAM: 2 CM
BH CV ECHO MEAS - LVPWD: 0.8 CM
BH CV ECHO MEAS - MED PEAK E' VEL: 16 CM/SEC
BH CV ECHO MEAS - MV A MAX VEL: 45.1 CM/SEC
BH CV ECHO MEAS - MV DEC SLOPE: 484 CM/SEC2
BH CV ECHO MEAS - MV DEC TIME: 0.22 MSEC
BH CV ECHO MEAS - MV E MAX VEL: 57.6 CM/SEC
BH CV ECHO MEAS - MV E/A: 1.28
BH CV ECHO MEAS - MV MAX PG: 1.45 MMHG
BH CV ECHO MEAS - MV MEAN PG: 1 MMHG
BH CV ECHO MEAS - MV P1/2T: 41.3 MSEC
BH CV ECHO MEAS - MV V2 VTI: 21.6 CM
BH CV ECHO MEAS - MVA(P1/2T): 5.3 CM2
BH CV ECHO MEAS - MVA(VTI): 2.8 CM2
BH CV ECHO MEAS - PA ACC TIME: 0.14 SEC
BH CV ECHO MEAS - PA PR(ACCEL): 15.5 MMHG
BH CV ECHO MEAS - RAP SYSTOLE: 8 MMHG
BH CV ECHO MEAS - RVSP: 13 MMHG
BH CV ECHO MEAS - SI(MOD-SP2): 21.6 ML/M2
BH CV ECHO MEAS - SI(MOD-SP4): 29.6 ML/M2
BH CV ECHO MEAS - SV(LVOT): 59.7 ML
BH CV ECHO MEAS - SV(MOD-SP2): 47.1 ML
BH CV ECHO MEAS - SV(MOD-SP4): 64.4 ML
BH CV ECHO MEAS - TAPSE (>1.6): 2.13 CM
BH CV ECHO MEAS - TR MAX PG: 4.8 MMHG
BH CV ECHO MEAS - TR MAX VEL: 109 CM/SEC
BH CV ECHO MEASUREMENTS AVERAGE E/E' RATIO: 3.65
BH CV VAS BP RIGHT ARM: NORMAL MMHG
BH CV XLRA - RV BASE: 3.7 CM
BH CV XLRA - RV LENGTH: 7.8 CM
BH CV XLRA - RV MID: 3.2 CM
BH CV XLRA - TDI S': 17.2 CM/SEC
LEFT ATRIUM VOLUME INDEX: 20.3 ML/M2
MAXIMAL PREDICTED HEART RATE: 176 BPM
STRESS TARGET HR: 150 BPM

## 2022-10-07 PROCEDURE — 94727 GAS DIL/WSHOT DETER LNG VOL: CPT | Performed by: INTERNAL MEDICINE

## 2022-10-07 PROCEDURE — 94729 DIFFUSING CAPACITY: CPT

## 2022-10-07 PROCEDURE — 93356 MYOCRD STRAIN IMG SPCKL TRCK: CPT | Performed by: INTERNAL MEDICINE

## 2022-10-07 PROCEDURE — 93306 TTE W/DOPPLER COMPLETE: CPT

## 2022-10-07 PROCEDURE — 94729 DIFFUSING CAPACITY: CPT | Performed by: INTERNAL MEDICINE

## 2022-10-07 PROCEDURE — 94010 BREATHING CAPACITY TEST: CPT

## 2022-10-07 PROCEDURE — 94727 GAS DIL/WSHOT DETER LNG VOL: CPT

## 2022-10-07 PROCEDURE — 94010 BREATHING CAPACITY TEST: CPT | Performed by: INTERNAL MEDICINE

## 2022-10-07 PROCEDURE — 93356 MYOCRD STRAIN IMG SPCKL TRCK: CPT

## 2022-10-07 PROCEDURE — 93306 TTE W/DOPPLER COMPLETE: CPT | Performed by: INTERNAL MEDICINE

## 2022-10-07 NOTE — PROGRESS NOTES
"CHEMOTHERAPY PREPARATION    William Maria  [id number]  1975    Subjective   Chief Complaint: Treatment Preparation and Needs Assessment    History of present illness:  William Maria is a 46 y.o. year old male who is here today for chemotherapy preparation and needs assessment. The patient has been diagnosed with Hodgkin's lymphoma and is scheduled to begin  IV treatment with ABVD.     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     Past Medical History, Past Surgical History, Social History, Family History have been reviewed and are without significant changes except as mentioned.      Review of Systems   Constitutional: Positive for fatigue.   All other systems reviewed and are negative.      Objective   Physical Exam  Vital Signs: /65   Pulse 75   Temp 98.3 °F (36.8 °C) (Temporal)   Resp 12   Ht 188 cm (74\")   Wt 90.7 kg (200 lb)   SpO2 99%   BMI 25.68 kg/m²    General Appearance:  alert, cooperative, no apparent distress and appears stated age   Neurologic/Psychiatric: A&O x 3, gait steady, appropriate affect   HEENT:  Normocephalic, without obvious abnormality, mucous membranes moist   Lungs:   Clear to auscultation bilaterally; respirations regular, even, and unlabored bilaterally   Heart:  Regular rate and rhythm, no murmurs appreciated   Extremities: Normal, atraumatic; no clubbing, cyanosis, or edema    Skin: No rashes, lesions, or abnormal coloration noted     ECOG Performance Status: 1 - Symptomatic but completely ambulatory            NEEDS ASSESSMENTS    Genetics  The patient's new " diagnosis and family history have been reviewed for genetic counseling needs. A genetic referral is not recommended.     Psychosocial  The patient has completed a PHQ-9 Depression Screening and the Distress Thermometer (DT) today.   PHQ-9 results show 0 (No Depression). The patient scored their distress today as 0 on a scale of 0-10 with 0 being no distress and 10 being extreme distress.   Problems marked as being an issue for him within the last week include no problems.   Results were reviewed along with psychosocial resources offered by our cancer center. Our oncology social worker will be flagged for a DT score of 4 or above, and a same day call will be made for a score of 9 or 10. A mental health referral is not recommended at this time. The patient is not accepting of a referral to [facility].   Copies of patient's questionnaires will be scanned into EMR for details and further reference.    Barriers to care  A barriers form was also completed by the patient today. We discussed services offered by our facility to help him have adequate access to care. The patient was given the name and card for our Oncology Social Worker, Mackenzie Hidalgo. Based upon barriers assessment today, the patient will not require a follow-up call from the  to further discuss needs.   A copy of the barriers form will also be scanned into EMR for details and further reference.     VAD Assessment  The patient and I discussed planned intervenous chemotherapy as well as other IV treatments that are often needed throughout the course of treatment. These may include, but are not limited to blood transfusions, antibiotics, and IV hydration. The patient's vasculature does not appear to be adequate for multiple peripheral IVs throughout their treatment course. Discussed risks and benefits of VADs. The patient would like to pursue Port-A-Cath insertion prior to initiation of treatment.       Advance Care Planning   The patient  "and I discussed advanced care planning, \"Conversations that Matter\".   This service was offered, free of charge, for development of advance directives with a certified ACP facilitator.  The patient does not have an up-to-date advanced directive. This document is not on file with our office. The patient is not interested in an appointment with one of our facilitators to create or update their advanced directives.         Palliative Care  The patient and I discussed palliative care services. Palliative care is not the same as Hospice care. This is specialized medical care for people living with serious illness with the goal of improving quality of life for the patient and their family. [facility] has partnered with [facility] to offer our patients outpatient palliative care early along with their treatment to assist in coordination of care, symptom management, pain management, and medical decision making.  Oncology criteria for palliative care referral is not met at this time. The patient is not interested in a palliative care consultation.     Additional Referral needs  Nutrition      IV CHEMOTHERAPY EDUCATION    Booklets Given: Chemotherapy and You [x]  Eating Hints [x]    Sexuality/Fertility Books []      Chemotherapy/Biotherapy Education Sheets: (list all that apply)  nausea management, acid reflux management, diarrhea management, Cancer resourse contacts information, skin and mouth care and vaccination information                                                                                                                                                                 Chemotherapy Regimen:   Treatment Plans     Name Type Plan Dates Plan Provider         Active    OP HODGKIN LYMPHOMA ABVD DOXOrubicin / Dacarbazine / VinBLAStine / Bleomycin ONCOLOGY TREATMENT  10/17/2022 - Present Tamie Mccullough MD                                Chemotherapy education comprehension reviewed. Questions answered and " additional information discussed on topics including:  Anemia, Thrombocytopenia, Neutropenia, Nutrition and appetite changes, Constipation, Diarrhea, Nausea & vomiting, Mouth sores, Alopecia, Infertility & sexuality, Nervous system changes, Pain, Skin & nail changes, Organ toxicities, Survivorship, Home care and Vaccinations        Assessment and Plan:    Diagnoses and all orders for this visit:    1. 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 Take 1 tablet at night on Days 2, 3 and 4 and Days 16, 17 and 18.  Dispense: 6 tablet; Refill: 5  -     ondansetron (ZOFRAN) 8 MG tablet; Take 1 tablet by mouth 3 (Three) Times a Day As Needed for Nausea or Vomiting.  Dispense: 30 tablet; Refill: 5        This was a 60 minute face-to-face visit with 50 minutes spent in  counseling and coordination of care as documented above.   The patient and I have reviewed their new cancer diagnosis and scheduled treatment plan. Needs assessment was completed including genetics, psychosocial needs, barriers to care, VAD evaluation, advanced care planning, and palliative care services. Referrals have been ordered as appropriate based upon our evaluation and patient desires.     Nutrition will see patient on day 1 of treatment.  Declines referral to social work, pastoral care, and mental health provider.    Echo scheduled for 10/7/2022 showed EF of 57.3.  We will continue to monitor.      PET scan is scheduled for today.  We will follow-up with results.    Patient scheduled to see Mya Alonzo for follow-up for biopsy on Wednesday.  Plan as to do consult for port at that time.    We discussed Zofran was sent to the pharmacy.  He will take 1 tablet by mouth every 8 hours as needed for nausea and vomiting.  We also did discuss that Zyprexa was sent to his  pharmacy.  He will take 1 tablet by mouth every night on days 2, 3, and 4 as well as a 16, 17, and 18 after each treatment.    IV  chemotherapy teaching was also completed today as documented above. Adequate time was given to answer all questions to his satisfaction. Patient and family are aware of their care team members and contact information if they have questions or problems throughout the treatment course. Needs assessments and education has been completed. The patient is adequately prepared to begin treatment as scheduled.     Electronically signed by ASHLYN Becker on 10/10/22 at 11:13 EDT.

## 2022-10-10 ENCOUNTER — HOSPITAL ENCOUNTER (OUTPATIENT)
Dept: PET IMAGING | Facility: HOSPITAL | Age: 47
Discharge: HOME | End: 2022-10-10

## 2022-10-10 ENCOUNTER — LAB (OUTPATIENT)
Dept: LAB | Facility: HOSPITAL | Age: 47
End: 2022-10-10

## 2022-10-10 ENCOUNTER — OFFICE VISIT (OUTPATIENT)
Dept: ONCOLOGY | Facility: CLINIC | Age: 47
End: 2022-10-10

## 2022-10-10 ENCOUNTER — TELEPHONE (OUTPATIENT)
Dept: ONCOLOGY | Facility: CLINIC | Age: 47
End: 2022-10-10

## 2022-10-10 VITALS
SYSTOLIC BLOOD PRESSURE: 110 MMHG | BODY MASS INDEX: 25.67 KG/M2 | WEIGHT: 200 LBS | OXYGEN SATURATION: 99 % | TEMPERATURE: 98.3 F | HEIGHT: 74 IN | HEART RATE: 75 BPM | DIASTOLIC BLOOD PRESSURE: 65 MMHG | RESPIRATION RATE: 12 BRPM

## 2022-10-10 DIAGNOSIS — C81.04 NODULAR LYMPHOCYTE PREDOMINANT HODGKIN LYMPHOMA OF LYMPH NODES OF AXILLA: Primary | ICD-10-CM

## 2022-10-10 DIAGNOSIS — C85.92 LYMPHOMA OF INTRATHORACIC LYMPH NODES, UNSPECIFIED LYMPHOMA TYPE: ICD-10-CM

## 2022-10-10 LAB
ERYTHROCYTE [SEDIMENTATION RATE] IN BLOOD: 46 MM/HR (ref 0–15)
GLUCOSE BLDC GLUCOMTR-MCNC: 89 MG/DL (ref 70–130)
HAV IGM SERPL QL IA: NORMAL
HBV CORE IGM SERPL QL IA: NORMAL
HBV SURFACE AG SERPL QL IA: NORMAL
HCV AB SER DONR QL: NORMAL
HIV1 P24 AG SER QL: NORMAL
HIV1+2 AB SER QL: NORMAL

## 2022-10-10 PROCEDURE — G0475 HIV COMBINATION ASSAY: HCPCS

## 2022-10-10 PROCEDURE — 85652 RBC SED RATE AUTOMATED: CPT

## 2022-10-10 PROCEDURE — 80074 ACUTE HEPATITIS PANEL: CPT

## 2022-10-10 PROCEDURE — 36415 COLL VENOUS BLD VENIPUNCTURE: CPT

## 2022-10-10 PROCEDURE — 0 FLUDEOXYGLUCOSE F18 SOLUTION: Performed by: INTERNAL MEDICINE

## 2022-10-10 PROCEDURE — 82962 GLUCOSE BLOOD TEST: CPT

## 2022-10-10 PROCEDURE — 99215 OFFICE O/P EST HI 40 MIN: CPT | Performed by: NURSE PRACTITIONER

## 2022-10-10 PROCEDURE — 78815 PET IMAGE W/CT SKULL-THIGH: CPT

## 2022-10-10 PROCEDURE — A9552 F18 FDG: HCPCS | Performed by: INTERNAL MEDICINE

## 2022-10-10 RX ORDER — OLANZAPINE 5 MG/1
5 TABLET ORAL NIGHTLY
Qty: 6 TABLET | Refills: 5 | Status: SHIPPED | OUTPATIENT
Start: 2022-10-10

## 2022-10-10 RX ORDER — ONDANSETRON HYDROCHLORIDE 8 MG/1
8 TABLET, FILM COATED ORAL 3 TIMES DAILY PRN
Qty: 30 TABLET | Refills: 5 | Status: SHIPPED | OUTPATIENT
Start: 2022-10-10

## 2022-10-10 RX ADMIN — FLUDEOXYGLUCOSE F18 1 DOSE: 300 INJECTION INTRAVENOUS at 13:45

## 2022-10-11 ENCOUNTER — TELEPHONE (OUTPATIENT)
Dept: ONCOLOGY | Facility: CLINIC | Age: 47
End: 2022-10-11

## 2022-10-13 ENCOUNTER — PREP FOR SURGERY (OUTPATIENT)
Dept: OTHER | Facility: HOSPITAL | Age: 47
End: 2022-10-13

## 2022-10-13 ENCOUNTER — TELEPHONE (OUTPATIENT)
Dept: SURGERY | Facility: CLINIC | Age: 47
End: 2022-10-13

## 2022-10-13 DIAGNOSIS — C85.92 LYMPHOMA OF INTRATHORACIC LYMPH NODES, UNSPECIFIED LYMPHOMA TYPE: Primary | ICD-10-CM

## 2022-10-13 RX ORDER — HEPARIN SODIUM 5000 [USP'U]/ML
5000 INJECTION, SOLUTION INTRAVENOUS; SUBCUTANEOUS ONCE
Status: CANCELLED | OUTPATIENT
Start: 2022-10-13

## 2022-10-13 RX ORDER — SODIUM CHLORIDE, SODIUM LACTATE, POTASSIUM CHLORIDE, CALCIUM CHLORIDE 600; 310; 30; 20 MG/100ML; MG/100ML; MG/100ML; MG/100ML
50 INJECTION, SOLUTION INTRAVENOUS CONTINUOUS
Status: CANCELLED | OUTPATIENT
Start: 2022-10-13

## 2022-10-13 RX ORDER — CEFAZOLIN SODIUM 2 G/50ML
2 SOLUTION INTRAVENOUS ONCE
Status: CANCELLED | OUTPATIENT
Start: 2022-10-13 | End: 2022-10-13

## 2022-10-13 RX ORDER — SODIUM CHLORIDE 0.9 % (FLUSH) 0.9 %
10 SYRINGE (ML) INJECTION EVERY 12 HOURS SCHEDULED
Status: CANCELLED | OUTPATIENT
Start: 2022-10-13

## 2022-10-13 RX ORDER — SODIUM CHLORIDE 0.9 % (FLUSH) 0.9 %
10 SYRINGE (ML) INJECTION AS NEEDED
Status: CANCELLED | OUTPATIENT
Start: 2022-10-13

## 2022-10-13 NOTE — PRE-PROCEDURE INSTRUCTIONS
PAT phone history completed with pt for upcoming procedure on 10/14/22 with Dr. Alonzo.     PAT PASS GIVEN/REVIEWED WITH PT.  VERBALIZED UNDERSTANDING OF THE FOLLOWING:  DO NOT EAT, DRINK, SMOKE, USE SMOKELESS TOBACCO OR CHEW GUM AFTER MIDNIGHT THE NIGHT BEFORE SURGERY.  THIS ALSO INCLUDES HARD CANDIES AND MINTS.    DO NOT SHAVE THE AREA TO BE OPERATED ON AT LEAST 48 HOURS PRIOR TO THE PROCEDURE.  DO NOT WEAR MAKE UP OR NAIL POLISH.  DO NOT LEAVE IN ANY PIERCING OR WEAR JEWELRY THE DAY OF SURGERY.      DO NOT USE ADHESIVES IF YOU WEAR DENTURES.    DO NOT WEAR EYE CONTACTS; BRING IN YOUR GLASSES.    ONLY TAKE MEDICATION THE MORNING OF YOUR PROCEDURE IF INSTRUCTED BY YOUR SURGEON WITH ENOUGH WATER TO SWALLOW THE MEDICATION.  IF YOUR SURGEON DID NOT SPECIFY WHICH MEDICATIONS TO TAKE, YOU WILL NEED TO CALL THEIR OFFICE FOR FURTHER INSTRUCTIONS AND DO AS THEY INSTRUCT.    LEAVE ANYTHING YOU CONSIDER VALUABLE AT HOME.    YOU WILL NEED TO ARRANGE FOR SOMEONE TO DRIVE YOU HOME AFTER SURGERY.  IT IS RECOMMENDED THAT YOU DO NOT DRIVE, WORK, DRINK ALCOHOL OR MAKE MAJOR DECISIONS FOR AT LEAST 24 HOURS AFTER YOUR PROCEDURE IS COMPLETE.      THE DAY OF YOUR PROCEDURE, BRING IN THE FOLLOWING IF APPLICABLE:   PICTURE ID AND INSURANCE/MEDICARE OR MEDICAID CARDS/ANY CO-PAY THAT MAY BE DUE   COPY OF ADVANCED DIRECTIVE/LIVING WILL/POWER OR    CPAP/BIPAP/INHALERS   SKIN PREP SHEET   YOUR PREADMISSION TESTING PASS (IF NOT A PHONE HISTORY)

## 2022-10-13 NOTE — TELEPHONE ENCOUNTER
Called patient, spoke to his wife Velvet regarding port placement for chemo therapy tomorrow at [facility]. She confirmed tomorrow and will be added to Dr. Alonzo's OR schedule as last case.

## 2022-10-14 ENCOUNTER — ANESTHESIA (OUTPATIENT)
Dept: PERIOP | Facility: HOSPITAL | Age: 47
End: 2022-10-14

## 2022-10-14 ENCOUNTER — ANESTHESIA EVENT (OUTPATIENT)
Dept: PERIOP | Facility: HOSPITAL | Age: 47
End: 2022-10-14

## 2022-10-14 ENCOUNTER — HOSPITAL ENCOUNTER (OUTPATIENT)
Facility: HOSPITAL | Age: 47
Setting detail: HOSPITAL OUTPATIENT SURGERY
Discharge: HOME | End: 2022-10-14
Attending: SURGERY | Admitting: SURGERY

## 2022-10-14 ENCOUNTER — APPOINTMENT (OUTPATIENT)
Dept: GENERAL RADIOLOGY | Facility: HOSPITAL | Age: 47
End: 2022-10-14

## 2022-10-14 VITALS
TEMPERATURE: 97.6 F | BODY MASS INDEX: 25.15 KG/M2 | HEIGHT: 74 IN | HEART RATE: 70 BPM | RESPIRATION RATE: 16 BRPM | WEIGHT: 196 LBS | SYSTOLIC BLOOD PRESSURE: 121 MMHG | OXYGEN SATURATION: 97 % | DIASTOLIC BLOOD PRESSURE: 75 MMHG

## 2022-10-14 DIAGNOSIS — C85.92 LYMPHOMA OF INTRATHORACIC LYMPH NODES, UNSPECIFIED LYMPHOMA TYPE: ICD-10-CM

## 2022-10-14 PROCEDURE — 77001 FLUOROGUIDE FOR VEIN DEVICE: CPT | Performed by: SURGERY

## 2022-10-14 PROCEDURE — 36561 INSERT TUNNELED CV CATH: CPT | Performed by: SURGERY

## 2022-10-14 PROCEDURE — 71045 X-RAY EXAM CHEST 1 VIEW: CPT

## 2022-10-14 PROCEDURE — 76937 US GUIDE VASCULAR ACCESS: CPT | Performed by: SURGERY

## 2022-10-14 PROCEDURE — 25010000002 FENTANYL CITRATE (PF) 50 MCG/ML SOLUTION: Performed by: NURSE ANESTHETIST, CERTIFIED REGISTERED

## 2022-10-14 PROCEDURE — 25010000002 MIDAZOLAM PER 1MG: Performed by: NURSE ANESTHETIST, CERTIFIED REGISTERED

## 2022-10-14 PROCEDURE — C1788 PORT, INDWELLING, IMP: HCPCS | Performed by: SURGERY

## 2022-10-14 PROCEDURE — 0 CEFAZOLIN SODIUM-DEXTROSE 2-3 GM-%(50ML) RECONSTITUTED SOLUTION: Performed by: SURGERY

## 2022-10-14 PROCEDURE — 25010000002 PROPOFOL 10 MG/ML EMULSION: Performed by: NURSE ANESTHETIST, CERTIFIED REGISTERED

## 2022-10-14 PROCEDURE — 76000 FLUOROSCOPY <1 HR PHYS/QHP: CPT

## 2022-10-14 PROCEDURE — 25010000002 HEPARIN (PORCINE) PER 1000 UNITS: Performed by: SURGERY

## 2022-10-14 DEVICE — PRT INTRO VASC/INTERV VORTEX FILL/HL DETACH/POLYURET/CATH 8F: Type: IMPLANTABLE DEVICE | Site: INTERNAL JUGULAR | Status: FUNCTIONAL

## 2022-10-14 RX ORDER — SODIUM CHLORIDE 0.9 % (FLUSH) 0.9 %
10 SYRINGE (ML) INJECTION AS NEEDED
Status: DISCONTINUED | OUTPATIENT
Start: 2022-10-14 | End: 2022-10-14 | Stop reason: HOSPADM

## 2022-10-14 RX ORDER — KETAMINE HCL IN NACL, ISO-OSM 100MG/10ML
SYRINGE (ML) INJECTION AS NEEDED
Status: DISCONTINUED | OUTPATIENT
Start: 2022-10-14 | End: 2022-10-14 | Stop reason: SURG

## 2022-10-14 RX ORDER — CEFAZOLIN SODIUM 2 G/50ML
2 SOLUTION INTRAVENOUS ONCE
Status: COMPLETED | OUTPATIENT
Start: 2022-10-14 | End: 2022-10-14

## 2022-10-14 RX ORDER — FENTANYL CITRATE 50 UG/ML
INJECTION, SOLUTION INTRAMUSCULAR; INTRAVENOUS AS NEEDED
Status: DISCONTINUED | OUTPATIENT
Start: 2022-10-14 | End: 2022-10-14 | Stop reason: SURG

## 2022-10-14 RX ORDER — SODIUM CHLORIDE 0.9 % (FLUSH) 0.9 %
10 SYRINGE (ML) INJECTION EVERY 12 HOURS SCHEDULED
Status: DISCONTINUED | OUTPATIENT
Start: 2022-10-14 | End: 2022-10-14 | Stop reason: HOSPADM

## 2022-10-14 RX ORDER — SODIUM CHLORIDE, SODIUM LACTATE, POTASSIUM CHLORIDE, CALCIUM CHLORIDE 600; 310; 30; 20 MG/100ML; MG/100ML; MG/100ML; MG/100ML
50 INJECTION, SOLUTION INTRAVENOUS CONTINUOUS
Status: DISCONTINUED | OUTPATIENT
Start: 2022-10-14 | End: 2022-10-14 | Stop reason: HOSPADM

## 2022-10-14 RX ORDER — HYDROCODONE BITARTRATE AND ACETAMINOPHEN 7.5; 325 MG/1; MG/1
1 TABLET ORAL ONCE AS NEEDED
Status: DISCONTINUED | OUTPATIENT
Start: 2022-10-14 | End: 2022-10-14 | Stop reason: HOSPADM

## 2022-10-14 RX ORDER — MIDAZOLAM HYDROCHLORIDE 2 MG/2ML
INJECTION, SOLUTION INTRAMUSCULAR; INTRAVENOUS AS NEEDED
Status: DISCONTINUED | OUTPATIENT
Start: 2022-10-14 | End: 2022-10-14 | Stop reason: SURG

## 2022-10-14 RX ORDER — HEPARIN SODIUM 5000 [USP'U]/ML
5000 INJECTION, SOLUTION INTRAVENOUS; SUBCUTANEOUS ONCE
Status: COMPLETED | OUTPATIENT
Start: 2022-10-14 | End: 2022-10-14

## 2022-10-14 RX ORDER — SODIUM CHLORIDE, SODIUM LACTATE, POTASSIUM CHLORIDE, CALCIUM CHLORIDE 600; 310; 30; 20 MG/100ML; MG/100ML; MG/100ML; MG/100ML
INJECTION, SOLUTION INTRAVENOUS CONTINUOUS PRN
Status: DISCONTINUED | OUTPATIENT
Start: 2022-10-14 | End: 2022-10-14 | Stop reason: SURG

## 2022-10-14 RX ORDER — ONDANSETRON 2 MG/ML
4 INJECTION INTRAMUSCULAR; INTRAVENOUS ONCE AS NEEDED
Status: DISCONTINUED | OUTPATIENT
Start: 2022-10-14 | End: 2022-10-14 | Stop reason: HOSPADM

## 2022-10-14 RX ADMIN — MIDAZOLAM HYDROCHLORIDE 2 MG: 1 INJECTION, SOLUTION INTRAMUSCULAR; INTRAVENOUS at 13:34

## 2022-10-14 RX ADMIN — CEFAZOLIN SODIUM 2 G: 2 SOLUTION INTRAVENOUS at 13:34

## 2022-10-14 RX ADMIN — PROPOFOL 120 MCG/KG/MIN: 10 INJECTION, EMULSION INTRAVENOUS at 13:36

## 2022-10-14 RX ADMIN — FENTANYL CITRATE 50 MCG: 50 INJECTION INTRAMUSCULAR; INTRAVENOUS at 13:35

## 2022-10-14 RX ADMIN — FENTANYL CITRATE 50 MCG: 50 INJECTION INTRAMUSCULAR; INTRAVENOUS at 13:55

## 2022-10-14 RX ADMIN — HEPARIN SODIUM 5000 UNITS: 5000 INJECTION INTRAVENOUS; SUBCUTANEOUS at 13:38

## 2022-10-14 RX ADMIN — SODIUM CHLORIDE, POTASSIUM CHLORIDE, SODIUM LACTATE AND CALCIUM CHLORIDE: 600; 310; 30; 20 INJECTION, SOLUTION INTRAVENOUS at 14:26

## 2022-10-14 RX ADMIN — SODIUM CHLORIDE, POTASSIUM CHLORIDE, SODIUM LACTATE AND CALCIUM CHLORIDE: 600; 310; 30; 20 INJECTION, SOLUTION INTRAVENOUS at 12:18

## 2022-10-14 RX ADMIN — Medication 25 MG: at 13:37

## 2022-10-14 RX ADMIN — SODIUM CHLORIDE, POTASSIUM CHLORIDE, SODIUM LACTATE AND CALCIUM CHLORIDE 50 ML/HR: 600; 310; 30; 20 INJECTION, SOLUTION INTRAVENOUS at 11:19

## 2022-10-14 NOTE — ANESTHESIA POSTPROCEDURE EVALUATION
Patient: William Maria    Procedure Summary     Date: 10/14/22 Room / [facility]    Anesthesia Start: 1334 Anesthesia Stop: 1448    Procedure: INSERTION OF PORTACATH Diagnosis:       Lymphoma of intrathoracic lymph nodes, unspecified lymphoma type (HCC)      (Lymphoma of intrathoracic lymph nodes, unspecified lymphoma type (HCC) [C85.92])    Surgeons: Isaura Alonzo MD Provider: Yohan Johnson CRNA    Anesthesia Type: MAC ASA Status: 2          Anesthesia Type: MAC    Vitals  No vitals data found for the desired time range.          Post Anesthesia Care and Evaluation    Patient location during evaluation: PHASE II  Patient participation: complete - patient participated  Level of consciousness: awake  Pain score: 0  Pain management: adequate    Airway patency: patent  Anesthetic complications: No anesthetic complications  PONV Status: none  Cardiovascular status: acceptable  Respiratory status: acceptable and nasal cannula  Hydration status: acceptable    Comments: vsss resp spont, reflexes intact, responsive, report given to pacu nurse.  See R.N. note for postop vital signs.

## 2022-10-14 NOTE — ANESTHESIA PREPROCEDURE EVALUATION
Anesthesia Evaluation     Patient summary reviewed and Nursing notes reviewed   no history of anesthetic complications:  NPO Solid Status: > 8 hours  NPO Liquid Status: > 8 hours           Airway   Mallampati: I  TM distance: >3 FB  Neck ROM: full  Possible difficult intubation  Dental - normal exam     Pulmonary - negative pulmonary ROS and normal exam   (-) not a smoker  Cardiovascular - negative cardio ROS and normal exam  Exercise tolerance: good (4-7 METS)    ECG reviewed      ROS comment: ECHO 10/7/22-  Calculated left ventricular EF = 57.3% Left ventricular ejection fraction appears to be 56 - 60%. Left ventricular systolic function is normal. Normal global longitudinal LV strain (GLS) = -18.9%. Left ventricle strain data was reviewed by the physician and found to be accurate. Normal left ventricular cavity size and wall thickness noted.    Neuro/Psych- negative ROS  GI/Hepatic/Renal/Endo - negative ROS     Musculoskeletal (-) negative ROS    Abdominal    Substance History - negative use  (-) alcohol use, drug use     OB/GYN negative ob/gyn ROS         Other   blood dyscrasia anemia,   history of cancer (hodgkins lymphoma)                      Anesthesia Plan    ASA 2     MAC     (Risks and benefits discussed including risk of aspiration, recall and dental damage. All patient questions answered.    Will continue with plan of care.)  intravenous induction     Anesthetic plan, risks, benefits, and alternatives have been provided, discussed and informed consent has been obtained with: patient.  Pre-procedure education provided  Plan discussed with CRNA.        CODE STATUS:

## 2022-10-14 NOTE — INTERVAL H&P NOTE
H&P updated. The patient was examined and the following changes are noted:  Since the patient was last seen, lymph node biopsy performed on 9/30/2022 demonstrated classic Hodgkin's lymphoma, nodular sclerosis type.  The patient was seen in follow-up by oncology, and a plan to initiate chemotherapy with ABVD has been established.  For this reason, the patient requires insertion of a Port-A-Cath.  He is scheduled to begin therapy next week.  As such, he was counseled regarding the Port-A-Cath procedure along with its risk, benefits, and alternatives.  We specifically discussed the risks of bleeding, infection, Port-A-Cath malfunction requiring revision or replacement, hemothorax, pneumothorax, neurovascular injury, and the risks related to anesthesia.  The patient understands these, and is willing to proceed.  He has provided informed consent.

## 2022-10-14 NOTE — OP NOTE
PROCEDURE DATE: 10/14/2022    SURGEON: Isaura Alonzo MD, FACS    PREOPERATIVE DIAGNOSIS: Hodgkin's lymphoma    POSTOPERATIVE DIAGNOSIS: Same    PROCEDURE: Insertion of the left internal jugular Port-A-Cath with ultrasound and fluoroscopic guidance    ANESTHESIA: MAC    EBL: Minimal    SPECIMENS: None    INDICATIONS FOR THE PROCEDURE: Mr. Maria is a 46-year-old gentleman recently diagnosed with Hodgkin's lymphoma for which he is scheduled to initiate chemotherapy next week.  For this reason, a Port-A-Cath has been requested for long-term venous access.  He was counseled regarding the procedure along with its risks, benefits, and alternatives.  He understood these, and provided his informed consent to proceed.  He is now being brought to the operating room for the same.    DESCRIPTION OF THE PROCEDURE: The patient was seen and examined in the preoperative holding area on the day of surgical procedure.  His history and physical examination was updated as appropriate.  He received preoperative antibiotics, and subcutaneous heparin for DVT prophylaxis.  He was then taken to the operating room placed supine on the operating table, where a timeout was performed using the WHO checklist.  Following the satisfactory induction of anesthesia, shoulder roll was placed, and the patient's neck and chest was prepped and draped in the usual sterile fashion.  The ultrasound was used to assess the bilateral venous anatomy, and the left internal jugular vein was chosen for access.  The skin overlying the location of the internal jugular vein on the left was anesthetized with lidocaine.  Under live ultrasound visualization, the jugular vein was punctured on the first attempt with return of dark red, nonpulsatile, venous blood.  A guidewire was then threaded into the vein and its position was checked with both ultrasound, and fluoroscopy.  Some additional manipulation of the wire was required due to inadvertent curling of the wire  during its advancement to make the turn into the superior vena cava.  This was done under live fluoroscopy.  Once the wire was satisfactorily positioned, attention was turned to the chest wall which was similarly anesthetized with lidocaine and Marcaine in anticipation of creation of a subcutaneous chest wall pocket for the port.  A transverse incision was then made on the chest wall, and a combination of blunt dissection and Bovie electrocautery was used to create a subcutaneous chest wall pocket for placement of the port itself.  Hemostasis was achieved with cautery during this process.  Next, the tunneler was connected to the catheter, and passed from the jugular vein puncture site to the previously created chest wall pocket, and the catheter was threaded through the tunnel.  Under live fluoroscopy, a peel-away sheath and dilator were passed over the guidewire into the jugular vein without resistance or difficulty.  The guidewire and inner dilator were removed leaving the peel-away sheath in place.  The catheter was then threaded through the peel-away sheath.  Once again some additional manipulation of the catheter was necessary to avoid inadvertent curling of the catheter.  The catheter was positioned in the expected location of the distal SVC/atriocaval junction under fluoroscopy.  The catheter was then accessed and found to have excellent blood return, and flushed easily with heparinized saline.  The catheter was cut to the appropriate length on the chest wall side, connected to the port and secured with the included connector.  The port was then placed into the chest wall pocket, and accessed with a 20-gauge straight Gallegos needle.  There was again good blood return and the port flushed easily with heparinized saline.  A final flush of full-strength heparin was instilled into the port which was then the accessed.  The port was secured to the underlying tissue of the chest wall in 3 locations with interrupted  3-0 Prolene suture to avoid malposition of the port itself.  Once this was done, the tissue over the port was reapproximated using a deep layer of interrupted 3-0 Vicryl.  The skin was closed using a running, subcuticular 4-0 Vicryl.  The jugular vein puncture site was closed with a single subcuticular 4-0 Vicryl suture.  Mastisol and Steri-Strips were applied followed by dry sterile dressings.  The patient was awakened from anesthesia, and taken to recovery room in good condition.  No immediate complications were identified.  All sponge, needle, and instrument counts were correct at the conclusion of the procedure.  A postprocedural chest x-ray was performed and demonstrated appropriate positioning of the Port-A-Cath without evidence of pneumothorax.    RECOMMENDATIONS: The port may be used immediately for blood draws, infusions, and initiation of chemotherapy.

## 2022-10-14 NOTE — DISCHARGE INSTRUCTIONS
To assist you in voiding:  Drink plenty of fluids  Listen to running water while attempting to void.    If you are unable to urinate and you have an uncomfortable urge to void or it has been   6 hours since you were discharged, return to the Emergency Room Please follow all post op instructions and follow up appointment time from your physician's office included in your discharge packet.  .  Rest today  No pushing,pulling,tugging,heavy lifting, or strenuous activity   No major decision making,driving,or drinking alcoholic beverages for 24 hours due to the sedation you received  Always use good hand hygiene/washing technique  No driving on pain medication.

## 2022-10-18 ENCOUNTER — NURSE NAVIGATOR (OUTPATIENT)
Dept: ONCOLOGY | Facility: HOSPITAL | Age: 47
End: 2022-10-18

## 2022-10-18 ENCOUNTER — INFUSION (OUTPATIENT)
Dept: ONCOLOGY | Facility: HOSPITAL | Age: 47
End: 2022-10-18

## 2022-10-18 VITALS
WEIGHT: 193.8 LBS | SYSTOLIC BLOOD PRESSURE: 121 MMHG | OXYGEN SATURATION: 97 % | TEMPERATURE: 98 F | RESPIRATION RATE: 18 BRPM | BODY MASS INDEX: 24.88 KG/M2 | HEART RATE: 71 BPM | DIASTOLIC BLOOD PRESSURE: 80 MMHG

## 2022-10-18 DIAGNOSIS — C81.04 NODULAR LYMPHOCYTE PREDOMINANT HODGKIN LYMPHOMA OF LYMPH NODES OF AXILLA: Primary | ICD-10-CM

## 2022-10-18 DIAGNOSIS — C81.92: ICD-10-CM

## 2022-10-18 DIAGNOSIS — C81.92 HODGKIN LYMPHOMA OF INTRATHORACIC LYMPH NODES, UNSPECIFIED HODGKIN LYMPHOMA TYPE: ICD-10-CM

## 2022-10-18 LAB
ALBUMIN SERPL-MCNC: 3.6 G/DL (ref 3.5–5.2)
ALBUMIN/GLOB SERPL: 0.8 G/DL
ALP SERPL-CCNC: 84 U/L (ref 39–117)
ALT SERPL W P-5'-P-CCNC: 11 U/L (ref 1–41)
ANION GAP SERPL CALCULATED.3IONS-SCNC: 10.9 MMOL/L (ref 5–15)
ANISOCYTOSIS BLD QL: NORMAL
AST SERPL-CCNC: 13 U/L (ref 1–40)
BASOPHILS # BLD AUTO: 0.05 10*3/MM3 (ref 0–0.2)
BASOPHILS NFR BLD AUTO: 0.4 % (ref 0–1.5)
BILIRUB SERPL-MCNC: 0.3 MG/DL (ref 0–1.2)
BUN SERPL-MCNC: 16 MG/DL (ref 6–20)
BUN/CREAT SERPL: 21.6 (ref 7–25)
CALCIUM SPEC-SCNC: 9 MG/DL (ref 8.6–10.5)
CHLORIDE SERPL-SCNC: 97 MMOL/L (ref 98–107)
CO2 SERPL-SCNC: 28.1 MMOL/L (ref 22–29)
CREAT SERPL-MCNC: 0.74 MG/DL (ref 0.76–1.27)
DEPRECATED RDW RBC AUTO: 45.1 FL (ref 37–54)
EGFRCR SERPLBLD CKD-EPI 2021: 114.6 ML/MIN/1.73
EOSINOPHIL # BLD AUTO: 0.26 10*3/MM3 (ref 0–0.4)
EOSINOPHIL NFR BLD AUTO: 2 % (ref 0.3–6.2)
ERYTHROCYTE [DISTWIDTH] IN BLOOD BY AUTOMATED COUNT: 17.4 % (ref 12.3–15.4)
GLOBULIN UR ELPH-MCNC: 4.4 GM/DL
GLUCOSE SERPL-MCNC: 104 MG/DL (ref 65–99)
HCT VFR BLD AUTO: 34.6 % (ref 37.5–51)
HGB BLD-MCNC: 10.2 G/DL (ref 13–17.7)
IMM GRANULOCYTES # BLD AUTO: 0.07 10*3/MM3 (ref 0–0.05)
IMM GRANULOCYTES NFR BLD AUTO: 0.5 % (ref 0–0.5)
LYMPHOCYTES # BLD AUTO: 0.86 10*3/MM3 (ref 0.7–3.1)
LYMPHOCYTES NFR BLD AUTO: 6.5 % (ref 19.6–45.3)
MCH RBC QN AUTO: 21.3 PG (ref 26.6–33)
MCHC RBC AUTO-ENTMCNC: 29.5 G/DL (ref 31.5–35.7)
MCV RBC AUTO: 72.2 FL (ref 79–97)
MONOCYTES # BLD AUTO: 1.11 10*3/MM3 (ref 0.1–0.9)
MONOCYTES NFR BLD AUTO: 8.4 % (ref 5–12)
NEUTROPHILS NFR BLD AUTO: 10.9 10*3/MM3 (ref 1.7–7)
NEUTROPHILS NFR BLD AUTO: 82.2 % (ref 42.7–76)
NRBC BLD AUTO-RTO: 0 /100 WBC (ref 0–0.2)
PLATELET # BLD AUTO: 410 10*3/MM3 (ref 140–450)
PMV BLD AUTO: 10.1 FL (ref 6–12)
POTASSIUM SERPL-SCNC: 4.2 MMOL/L (ref 3.5–5.2)
PROT SERPL-MCNC: 8 G/DL (ref 6–8.5)
RBC # BLD AUTO: 4.79 10*6/MM3 (ref 4.14–5.8)
SMALL PLATELETS BLD QL SMEAR: NORMAL
SODIUM SERPL-SCNC: 136 MMOL/L (ref 136–145)
WBC MORPH BLD: NORMAL
WBC NRBC COR # BLD: 13.25 10*3/MM3 (ref 3.4–10.8)

## 2022-10-18 PROCEDURE — 96411 CHEMO IV PUSH ADDL DRUG: CPT

## 2022-10-18 PROCEDURE — 25010000002 DEXAMETHASONE SODIUM PHOSPHATE 20 MG/5ML SOLUTION: Performed by: INTERNAL MEDICINE

## 2022-10-18 PROCEDURE — 96415 CHEMO IV INFUSION ADDL HR: CPT

## 2022-10-18 PROCEDURE — 96375 TX/PRO/DX INJ NEW DRUG ADDON: CPT

## 2022-10-18 PROCEDURE — 85007 BL SMEAR W/DIFF WBC COUNT: CPT

## 2022-10-18 PROCEDURE — 25010000002 HEPARIN LOCK FLUSH PER 10 UNITS: Performed by: INTERNAL MEDICINE

## 2022-10-18 PROCEDURE — 25010000002 VINBLASTINE PER 1 MG: Performed by: INTERNAL MEDICINE

## 2022-10-18 PROCEDURE — 25010000002 BLEOMYCIN PER 15 UNITS: Performed by: INTERNAL MEDICINE

## 2022-10-18 PROCEDURE — 96417 CHEMO IV INFUS EACH ADDL SEQ: CPT

## 2022-10-18 PROCEDURE — 25010000002 FOSAPREPITANT PER 1 MG: Performed by: INTERNAL MEDICINE

## 2022-10-18 PROCEDURE — 80053 COMPREHEN METABOLIC PANEL: CPT

## 2022-10-18 PROCEDURE — 96367 TX/PROPH/DG ADDL SEQ IV INF: CPT

## 2022-10-18 PROCEDURE — 85025 COMPLETE CBC W/AUTO DIFF WBC: CPT

## 2022-10-18 PROCEDURE — 25010000002 DOXORUBICIN PER 10 MG: Performed by: INTERNAL MEDICINE

## 2022-10-18 PROCEDURE — 25010000002 DACARBAZINE PER 200 MG: Performed by: INTERNAL MEDICINE

## 2022-10-18 PROCEDURE — 96413 CHEMO IV INFUSION 1 HR: CPT

## 2022-10-18 PROCEDURE — 25010000002 PALONOSETRON 0.25 MG/5ML SOLUTION PREFILLED SYRINGE: Performed by: INTERNAL MEDICINE

## 2022-10-18 PROCEDURE — 96366 THER/PROPH/DIAG IV INF ADDON: CPT

## 2022-10-18 RX ORDER — PALONOSETRON 0.05 MG/ML
0.25 INJECTION, SOLUTION INTRAVENOUS ONCE
Status: COMPLETED | OUTPATIENT
Start: 2022-10-18 | End: 2022-10-18

## 2022-10-18 RX ORDER — LIDOCAINE AND PRILOCAINE 25; 25 MG/G; MG/G
1 CREAM TOPICAL AS NEEDED
Qty: 30 G | Refills: 3 | Status: SHIPPED | OUTPATIENT
Start: 2022-10-18

## 2022-10-18 RX ORDER — SODIUM CHLORIDE 9 MG/ML
250 INJECTION, SOLUTION INTRAVENOUS ONCE
Status: CANCELLED | OUTPATIENT
Start: 2022-11-01

## 2022-10-18 RX ORDER — SODIUM CHLORIDE 9 MG/ML
250 INJECTION, SOLUTION INTRAVENOUS ONCE
Status: CANCELLED | OUTPATIENT
Start: 2022-10-18

## 2022-10-18 RX ORDER — HEPARIN SODIUM (PORCINE) LOCK FLUSH IV SOLN 100 UNIT/ML 100 UNIT/ML
500 SOLUTION INTRAVENOUS AS NEEDED
Status: DISCONTINUED | OUTPATIENT
Start: 2022-10-18 | End: 2022-10-18 | Stop reason: HOSPADM

## 2022-10-18 RX ORDER — OLANZAPINE 5 MG/1
5 TABLET ORAL ONCE
Status: COMPLETED | OUTPATIENT
Start: 2022-10-18 | End: 2022-10-18

## 2022-10-18 RX ORDER — SODIUM CHLORIDE 9 MG/ML
250 INJECTION, SOLUTION INTRAVENOUS ONCE
Status: DISCONTINUED | OUTPATIENT
Start: 2022-10-18 | End: 2022-10-18 | Stop reason: HOSPADM

## 2022-10-18 RX ORDER — DOXORUBICIN HYDROCHLORIDE 2 MG/ML
25 INJECTION, SOLUTION INTRAVENOUS ONCE
Status: CANCELLED | OUTPATIENT
Start: 2022-10-18

## 2022-10-18 RX ORDER — DOXORUBICIN HYDROCHLORIDE 2 MG/ML
25 INJECTION, SOLUTION INTRAVENOUS ONCE
Status: CANCELLED | OUTPATIENT
Start: 2022-11-01

## 2022-10-18 RX ORDER — DOXORUBICIN HYDROCHLORIDE 2 MG/ML
25 INJECTION, SOLUTION INTRAVENOUS ONCE
Status: COMPLETED | OUTPATIENT
Start: 2022-10-18 | End: 2022-10-18

## 2022-10-18 RX ORDER — PALONOSETRON 0.05 MG/ML
0.25 INJECTION, SOLUTION INTRAVENOUS ONCE
Status: CANCELLED | OUTPATIENT
Start: 2022-11-01

## 2022-10-18 RX ORDER — OLANZAPINE 5 MG/1
5 TABLET ORAL ONCE
Status: CANCELLED | OUTPATIENT
Start: 2022-11-01 | End: 2022-11-01

## 2022-10-18 RX ORDER — HEPARIN SODIUM (PORCINE) LOCK FLUSH IV SOLN 100 UNIT/ML 100 UNIT/ML
500 SOLUTION INTRAVENOUS AS NEEDED
Status: CANCELLED | OUTPATIENT
Start: 2022-10-18

## 2022-10-18 RX ORDER — OLANZAPINE 5 MG/1
5 TABLET ORAL ONCE
Status: CANCELLED | OUTPATIENT
Start: 2022-10-18 | End: 2022-10-18

## 2022-10-18 RX ORDER — PALONOSETRON 0.05 MG/ML
0.25 INJECTION, SOLUTION INTRAVENOUS ONCE
Status: CANCELLED | OUTPATIENT
Start: 2022-10-18

## 2022-10-18 RX ADMIN — VINBLASTINE SULFATE 13 MG: 1 INJECTION INTRAVENOUS at 12:09

## 2022-10-18 RX ADMIN — OLANZAPINE 5 MG: 5 TABLET, FILM COATED ORAL at 11:26

## 2022-10-18 RX ADMIN — DEXAMETHASONE SODIUM PHOSPHATE 12 MG: 4 INJECTION, SOLUTION INTRAMUSCULAR; INTRAVENOUS at 11:28

## 2022-10-18 RX ADMIN — DOXORUBICIN HYDROCHLORIDE 54 MG: 2 INJECTION, SOLUTION INTRAVENOUS at 11:58

## 2022-10-18 RX ADMIN — BLEOMYCIN SULFATE 22 UNITS: 30 POWDER, FOR SOLUTION INTRAMUSCULAR; INTRAPLEURAL; INTRAVENOUS; SUBCUTANEOUS at 12:23

## 2022-10-18 RX ADMIN — DACARBAZINE 820 MG: 10 INJECTION, POWDER, FOR SOLUTION INTRAVENOUS at 12:46

## 2022-10-18 RX ADMIN — PALONOSETRON 0.25 MG: 0.25 INJECTION, SOLUTION INTRAVENOUS at 11:26

## 2022-10-18 RX ADMIN — HEPARIN 500 UNITS: 100 SYRINGE at 13:49

## 2022-10-18 RX ADMIN — FOSAPREPITANT DIMEGLUMINE 150 MG: 150 INJECTION, POWDER, LYOPHILIZED, FOR SOLUTION INTRAVENOUS at 11:29

## 2022-10-18 NOTE — PROGRESS NOTES
Oncology Nutrition Screening    Patient Name:  William Maria  YOB: 1975  MRN: [id number]  Date:  10/18/22  Physician:  Zenon    Spoke w/ pt for initial nutrition assessment regarding oncology treatment. Pt reports he has had intentional wt loss w/in the last year. Pt reports a total wt loss of 45# and most recently a 5-6# wt loss in the last 7 weeks. Pt reports he has a good appetite and has been eating a non-inflammatory diet to help with his prognosis. Pt drinks  oz of water per day w/ added electrolytes. RD and pt reviewed pt's caloric and protein needs during this time. Pt denies any questions/concerns at this time. RD to follow-up in 3 weeks and available PRN.     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     • HYDROcodone-acetaminophen (NORCO) 5-325 MG per tablet Take 1 tablet by mouth Every 4 (Four) Hours As Needed for Pain 8 tablet 0   • OLANZapine (ZyPREXA) 5 MG tablet Take 1 tablet by mouth Every Night. Take 1 tablet at night on Days 2, 3 and 4 and Days 16, 17 and 18. 6 tablet 5   • ondansetron (ZOFRAN) 8 MG tablet Take 1 tablet by mouth 3 (Three) Times a Day As Needed for Nausea or Vomiting. 30 tablet 5   • triamcinolone (KENALOG) 0.1 % cream Daily As Needed.       No current facility-administered medications for this visit.       Glycemic Risk:   N/A    Weight:   Height: 74in  Weight: 194 lbs.  Usual Body Weight: 200 lbs.   BMI: 24.88  Normal  Weight has decreased 45 pounds over last year     Oral Food  Intake:  Regular Diet - No Restrictions    Hydration Status:   How many 8 ounce glass of water of fluid do you drink per day?  10    Enteral Feeding:   N/A    Nutrition Symptoms:   No Problems with Eating    Activity:   Normal with no limitations     reports that he has never smoked. He has never used smokeless tobacco. He reports that he does not drink alcohol and does not use drugs.    Evaluation of Nutritional Risk:   Patient is not identified as a nutritional risk at time of screening; will follow patient through course of treatment for nutritional consultation as warranted.        Electronically signed by:  Arlyn Caldwell RD  16:18 EDT

## 2022-10-18 NOTE — OP NOTE
William Maria  9/30/2022    Pre-op Diagnosis:   Lymphadenopathy, supraclavicular [R59.0]       Post-Op Diagnosis:   Lymphadenopathy, supraclavicular [R59.0]    Procedure(s):  BIOPSY SUPRACLAVICULAR LYMPH NODE      Surgeon(s):  Isaura Alonzo MD    Anesthesia: Monitored Anesthesia Care      Estimated Blood Loss: minimal    Urine Voided: * No values recorded between 9/30/2022  3:45 PM and 9/30/2022  4:48 PM *    Specimens:                Specimens     ID Source Type Tests Collected By Collected At Frozen?    A Clavicle, Left Tissue · TISSUE EXAM, [facility]   Saima Herring RN 9/30/22 [id number]     Description: Left supraclavicular lymph node    Comment: Rule out lymphoma    This specimen was not marked as sent.    B Clavicle, Left Tissue · FLOW CYTOMETRY, [facility])   Saima Herring RN 9/30/22 [id number]     Description: Left supraclavicular lymph node     Comment: Rule out lymphoma    This specimen was not marked as sent.        Indications for the procedure: Mr. Maria is a 46-year-old gentleman referred by Dr. Tamie Mccullough from oncology to be evaluated for excisional lymph node biopsy for further investigation of microcytic anemia, weight loss, elevated inflammatory markers, with recent imaging demonstrating significant mediastinal and supraclavicular adenopathy concerning for lymphoma.  He previously underwent a bone marrow biopsy in August 2022 which demonstrated normocellular bone marrow with maturing trilineage hematopoiesis, increased stainable marrow iron, no dysplasia, fibrosis, or blasts.  Labs did not demonstrate any nutrient deficiency, or heavy metal toxicity.  CT imaging of the chest, abdomen, and pelvis on 9/7/2022 demonstrated extensive intrathoracic and supraclavicular adenopathy with lymph nodes measuring up to 3.8 cm.  Excisional biopsy was requested.  On exam he was noted to have supraclavicular adenopathy bilaterally, left greater than right, and  excisional biopsy was discussed with the patient and his wife.  We discussed the risks of bleeding, infection, neurovascular injury, lymphatic leak, and nondiagnostic specimens.  He understands, and wishes to proceed. He has signed informed consent.       Description of the procedure: The patient was seen and examined preoperatively in the holding area on the day of the scheduled surgical procedure.  His history and physical examination was updated as appropriate.  The operative site was marked.  Preoperative antibiotics were administered along with subcutaneous heparin for DVT prophylaxis.  The patient was then taken to the operating room and placed supine on the operating table, where a timeout was performed using the WHO checklist.  Following the satisfactory induction of anesthesia, the patient's left neck and chest were prepped and draped in the usual sterile fashion.  The previously identified palpable left supraclavicular node was reidentified, and the skin overlying this region was anesthetized with lidocaine.  A transverse incision was made overlying the palpable lymph node, and deepened to the full-thickness of the skin.  Hemostasis was achieved with cautery.  The incision was further deepened with cautery, until the musculature of the neck was encountered.  The musculature was retracted to expose the lymph node in question.  The lymph node was identified, and noted to be enlarged and abnormal in appearance.  A combination of blunt dissection, and electrocautery was used to circumferentially dissect the node away from the adjacent soft tissue.  Eventually, I was able to deliver the enlarged lymph node up out of the wound.  The pedicle of the lymph node was identified, and secured with clips, after which it was transected with  Metzenbaum scissors.  The node was then sectioned on the back table with a portion sent for flow cytometry in RPMI media, and the remainder placed in formalin.  The pathology  requisition was completed with the nurse to include the need to rule out lymphoma.  The specimen was then passed off the field.  The area was irrigated, and hemostasis was checked.  This was found to be excellent.  The previously transected lymphatic pedicle was identified, and noted to be secure.  The muscle was allowed to retract back into its usual position, and the soft tissue overlying this was reapproximated using an interrupted 3-0 Vicryl suture.  The skin was closed with a running, subcuticular 5-0 PDS.  Additional 0.25% Marcaine was infiltrated in the wound for postoperative analgesia.  Mastisol and Steri-Strips were applied along with a dry sterile dressing.  There were no complications, and the procedure was well-tolerated by the patient.  All sponge, needle, and instrument counts were correct at the conclusion of the surgical procedure.        Drains: None    Complications: None        Isaura Alonzo MD

## 2022-10-18 NOTE — PROGRESS NOTES
Met with patient today to introduce nurse navigator role. Patient denies any needs at this time. Quilt given to patient.

## 2022-11-01 ENCOUNTER — OFFICE VISIT (OUTPATIENT)
Dept: ONCOLOGY | Facility: CLINIC | Age: 47
End: 2022-11-01

## 2022-11-01 ENCOUNTER — INFUSION (OUTPATIENT)
Dept: ONCOLOGY | Facility: HOSPITAL | Age: 47
End: 2022-11-01

## 2022-11-01 VITALS
HEIGHT: 74 IN | WEIGHT: 202 LBS | HEART RATE: 65 BPM | RESPIRATION RATE: 12 BRPM | BODY MASS INDEX: 25.93 KG/M2 | DIASTOLIC BLOOD PRESSURE: 75 MMHG | TEMPERATURE: 97.8 F | SYSTOLIC BLOOD PRESSURE: 146 MMHG | OXYGEN SATURATION: 99 %

## 2022-11-01 DIAGNOSIS — C81.92 HODGKIN LYMPHOMA OF INTRATHORACIC LYMPH NODES, UNSPECIFIED HODGKIN LYMPHOMA TYPE: ICD-10-CM

## 2022-11-01 DIAGNOSIS — C81.02 NODULAR LYMPHOCYTE PREDOMINANT HODGKIN LYMPHOMA OF INTRATHORACIC LYMPH NODES: ICD-10-CM

## 2022-11-01 DIAGNOSIS — C81.92: Primary | ICD-10-CM

## 2022-11-01 DIAGNOSIS — C81.12 NODULAR SCLEROSIS HODGKIN LYMPHOMA OF INTRATHORACIC LYMPH NODES: Primary | ICD-10-CM

## 2022-11-01 LAB
ALBUMIN SERPL-MCNC: 4.1 G/DL (ref 3.5–5.2)
ALBUMIN/GLOB SERPL: 1.3 G/DL
ALP SERPL-CCNC: 85 U/L (ref 39–117)
ALT SERPL W P-5'-P-CCNC: 27 U/L (ref 1–41)
ANION GAP SERPL CALCULATED.3IONS-SCNC: 9.2 MMOL/L (ref 5–15)
AST SERPL-CCNC: 26 U/L (ref 1–40)
BILIRUB SERPL-MCNC: <0.2 MG/DL (ref 0–1.2)
BUN SERPL-MCNC: 24 MG/DL (ref 6–20)
BUN/CREAT SERPL: 30 (ref 7–25)
CALCIUM SPEC-SCNC: 9.1 MG/DL (ref 8.6–10.5)
CHLORIDE SERPL-SCNC: 102 MMOL/L (ref 98–107)
CO2 SERPL-SCNC: 26.8 MMOL/L (ref 22–29)
CREAT SERPL-MCNC: 0.8 MG/DL (ref 0.76–1.27)
DEPRECATED RDW RBC AUTO: 53.6 FL (ref 37–54)
EGFRCR SERPLBLD CKD-EPI 2021: 111.9 ML/MIN/1.73
EOSINOPHIL # BLD MANUAL: 0.08 10*3/MM3 (ref 0–0.4)
EOSINOPHIL NFR BLD MANUAL: 4 % (ref 0.3–6.2)
ERYTHROCYTE [DISTWIDTH] IN BLOOD BY AUTOMATED COUNT: 21.4 % (ref 12.3–15.4)
GLOBULIN UR ELPH-MCNC: 3.2 GM/DL
GLUCOSE SERPL-MCNC: 107 MG/DL (ref 65–99)
HCT VFR BLD AUTO: 38.2 % (ref 37.5–51)
HGB BLD-MCNC: 11.5 G/DL (ref 13–17.7)
HYPOCHROMIA BLD QL: ABNORMAL
LYMPHOCYTES # BLD MANUAL: 0.8 10*3/MM3 (ref 0.7–3.1)
LYMPHOCYTES NFR BLD MANUAL: 13 % (ref 5–12)
MCH RBC QN AUTO: 22.5 PG (ref 26.6–33)
MCHC RBC AUTO-ENTMCNC: 30.1 G/DL (ref 31.5–35.7)
MCV RBC AUTO: 74.6 FL (ref 79–97)
MICROCYTES BLD QL: ABNORMAL
MONOCYTES # BLD: 0.25 10*3/MM3 (ref 0.1–0.9)
NEUTROPHILS # BLD AUTO: 0.78 10*3/MM3 (ref 1.7–7)
NEUTROPHILS NFR BLD MANUAL: 41 % (ref 42.7–76)
PLAT MORPH BLD: NORMAL
PLATELET # BLD AUTO: 217 10*3/MM3 (ref 140–450)
PMV BLD AUTO: 9.6 FL (ref 6–12)
POTASSIUM SERPL-SCNC: 4.5 MMOL/L (ref 3.5–5.2)
PROT SERPL-MCNC: 7.3 G/DL (ref 6–8.5)
RBC # BLD AUTO: 5.12 10*6/MM3 (ref 4.14–5.8)
SCAN SLIDE: NORMAL
SODIUM SERPL-SCNC: 138 MMOL/L (ref 136–145)
VARIANT LYMPHS NFR BLD MANUAL: 1 % (ref 0–5)
VARIANT LYMPHS NFR BLD MANUAL: 41 % (ref 19.6–45.3)
WBC MORPH BLD: NORMAL
WBC NRBC COR # BLD: 1.9 10*3/MM3 (ref 3.4–10.8)

## 2022-11-01 PROCEDURE — 25010000002 DOXORUBICIN PER 10 MG: Performed by: INTERNAL MEDICINE

## 2022-11-01 PROCEDURE — 25010000002 VINBLASTINE PER 1 MG: Performed by: INTERNAL MEDICINE

## 2022-11-01 PROCEDURE — 80053 COMPREHEN METABOLIC PANEL: CPT

## 2022-11-01 PROCEDURE — 85007 BL SMEAR W/DIFF WBC COUNT: CPT

## 2022-11-01 PROCEDURE — 96413 CHEMO IV INFUSION 1 HR: CPT

## 2022-11-01 PROCEDURE — 85025 COMPLETE CBC W/AUTO DIFF WBC: CPT

## 2022-11-01 PROCEDURE — 25010000002 HEPARIN LOCK FLUSH PER 10 UNITS: Performed by: INTERNAL MEDICINE

## 2022-11-01 PROCEDURE — 96375 TX/PRO/DX INJ NEW DRUG ADDON: CPT

## 2022-11-01 PROCEDURE — 25010000002 BLEOMYCIN PER 15 UNITS: Performed by: INTERNAL MEDICINE

## 2022-11-01 PROCEDURE — 25010000002 DEXAMETHASONE SODIUM PHOSPHATE 20 MG/5ML SOLUTION: Performed by: INTERNAL MEDICINE

## 2022-11-01 PROCEDURE — 96367 TX/PROPH/DG ADDL SEQ IV INF: CPT

## 2022-11-01 PROCEDURE — 99215 OFFICE O/P EST HI 40 MIN: CPT | Performed by: INTERNAL MEDICINE

## 2022-11-01 PROCEDURE — 25010000002 PALONOSETRON 0.25 MG/5ML SOLUTION PREFILLED SYRINGE: Performed by: INTERNAL MEDICINE

## 2022-11-01 PROCEDURE — 96411 CHEMO IV PUSH ADDL DRUG: CPT

## 2022-11-01 PROCEDURE — 25010000002 FOSAPREPITANT PER 1 MG: Performed by: INTERNAL MEDICINE

## 2022-11-01 PROCEDURE — 96366 THER/PROPH/DIAG IV INF ADDON: CPT

## 2022-11-01 PROCEDURE — 25010000002 DACARBAZINE PER 200 MG: Performed by: INTERNAL MEDICINE

## 2022-11-01 RX ORDER — DOXORUBICIN HYDROCHLORIDE 2 MG/ML
25 INJECTION, SOLUTION INTRAVENOUS ONCE
Status: COMPLETED | OUTPATIENT
Start: 2022-11-01 | End: 2022-11-01

## 2022-11-01 RX ORDER — HEPARIN SODIUM (PORCINE) LOCK FLUSH IV SOLN 100 UNIT/ML 100 UNIT/ML
500 SOLUTION INTRAVENOUS AS NEEDED
Status: DISCONTINUED | OUTPATIENT
Start: 2022-11-01 | End: 2022-11-01 | Stop reason: HOSPADM

## 2022-11-01 RX ORDER — PALONOSETRON 0.05 MG/ML
0.25 INJECTION, SOLUTION INTRAVENOUS ONCE
Status: COMPLETED | OUTPATIENT
Start: 2022-11-01 | End: 2022-11-01

## 2022-11-01 RX ORDER — HEPARIN SODIUM (PORCINE) LOCK FLUSH IV SOLN 100 UNIT/ML 100 UNIT/ML
500 SOLUTION INTRAVENOUS AS NEEDED
Status: CANCELLED | OUTPATIENT
Start: 2022-11-01

## 2022-11-01 RX ORDER — OLANZAPINE 5 MG/1
5 TABLET ORAL ONCE
Status: DISCONTINUED | OUTPATIENT
Start: 2022-11-01 | End: 2022-11-01 | Stop reason: HOSPADM

## 2022-11-01 RX ORDER — SODIUM CHLORIDE 9 MG/ML
250 INJECTION, SOLUTION INTRAVENOUS ONCE
Status: DISCONTINUED | OUTPATIENT
Start: 2022-11-01 | End: 2022-11-01 | Stop reason: HOSPADM

## 2022-11-01 RX ADMIN — DACARBAZINE 820 MG: 10 INJECTION, POWDER, FOR SOLUTION INTRAVENOUS at 14:00

## 2022-11-01 RX ADMIN — DEXAMETHASONE SODIUM PHOSPHATE 12 MG: 4 INJECTION, SOLUTION INTRAMUSCULAR; INTRAVENOUS at 12:32

## 2022-11-01 RX ADMIN — VINBLASTINE SULFATE 13 MG: 1 INJECTION INTRAVENOUS at 13:22

## 2022-11-01 RX ADMIN — FOSAPREPITANT DIMEGLUMINE 150 MG: 150 INJECTION, POWDER, LYOPHILIZED, FOR SOLUTION INTRAVENOUS at 12:11

## 2022-11-01 RX ADMIN — DOXORUBICIN HYDROCHLORIDE 54 MG: 2 INJECTION, SOLUTION INTRAVENOUS at 13:14

## 2022-11-01 RX ADMIN — BLEOMYCIN SULFATE 22 UNITS: 30 POWDER, FOR SOLUTION INTRAMUSCULAR; INTRAPLEURAL; INTRAVENOUS; SUBCUTANEOUS at 13:42

## 2022-11-01 RX ADMIN — HEPARIN 500 UNITS: 100 SYRINGE at 14:31

## 2022-11-01 RX ADMIN — PALONOSETRON 0.25 MG: 0.25 INJECTION, SOLUTION INTRAVENOUS at 12:50

## 2022-11-01 NOTE — PROGRESS NOTES
"      PROBLEM LIST:  1.  Hodgkin lymphoma, stage IIB  A) bone marrow biopsy done for chronic anemia 8/12/2022 showed a normocellular bone marrow with maturing trilineage hematopoiesis, increased stainable marrow iron, no evidence of dysplasia, fibrosis, or increased blasts.  Labs showed no evidence of nutrient deficiency, or heavy metal toxicity.  B) CT chest abdomen pelvis on 9/7/2022 showed extensive intrathoracic and supraclavicular lymphadenopathy, largest superior mediastinal lymph node 3.8 cm, left supraclavicular lymph node 2.8 cm, right supraclavicular lymph node 2.7 cm.  Hypodense lesions in the liver most likely benign cysts.  C) supraclavicular lymph node biopsy on 9/30/2022 showed nodular sclerosing Hodgkin lymphoma.  PET/CT on 10/10/2022 showed enlarged and hypermetabolic mediastinal lymphadenopathy, contiguous to lower cervical regions.  No other sites of disease.  D) treatment with ABVD started 10/18/2022.  2. Weight loss    Subjective     CHIEF COMPLAINT: anemia    HISTORY OF PRESENT ILLNESS:   William Maria returns for follow-up.  He says he has done really well.  He had some drowsiness and fogginess that he thinks was from the Zyprexa.  By the Saturday after his treatment he felt really good and he has noticed that his energy is improving.  He has been making a conscious effort to eat more and has put on some weight.  He says he feels better than he has in a year.      Objective      /75   Pulse 65   Temp 97.8 °F (36.6 °C) (Temporal)   Resp 12   Ht 188 cm (74\")   Wt 91.6 kg (202 lb)   SpO2 99%   BMI 25.94 kg/m²      Performance Status:0                General: well appearing male in no acute distress  Neuro: alert and oriented  HEENT: sclera anicteric, oropharynx clear  Lymphatics no palpable supra or pannicular or cervical adenopathy  Cardiovascular: Regular rate and rhythm  Lungs: Clear to auscultation bilaterally  Extremeties: no lower extremity edema  Psych: mood and affect " appropriate          RECENT LABS:    Lab Results   Component Value Date    WBC 13.25 (H) 10/18/2022    HGB 10.2 (L) 10/18/2022    HCT 34.6 (L) 10/18/2022    MCV 72.2 (L) 10/18/2022     10/18/2022     Lab Results   Component Value Date    GLUCOSE 104 (H) 10/18/2022    BUN 16 10/18/2022    CREATININE 0.74 (L) 10/18/2022    BCR 21.6 10/18/2022    K 4.2 10/18/2022    CO2 28.1 10/18/2022    CALCIUM 9.0 10/18/2022    PROTENTOTREF 7.1 07/12/2022    ALBUMIN 3.60 10/18/2022    LABIL2 0.8 07/12/2022    AST 13 10/18/2022    ALT 11 10/18/2022                 ASSESSMENT AND PLAN:     William Maria is a 46 y.o. male with stage IIb Hodgkin lymphoma, here for follow-up.    Hodgkin lymphoma: He had his first cycle of ABVD and tolerated it well.  No lung symptoms.  His energy level overall has improved since starting treatment.  We will continue with day 15 cycle 1 ABVD.  He will have an interim PET scan following 2 complete cycles and I will go ahead and order this today.  We discussed that if he does have a good response on imaging, then there will be a decision to be made regarding continuing with chemotherapy alone for 4 additional cycles, versus 2 additional cycles plus involved field radiotherapy.  We discussed that radiation is associated with a lower risk of relapse, but no difference in overall survival, and some increased risk of late complications.  I may refer him to radiation oncology for further discussion of this.    Nausea: No significant issues with treatment.  He would prefer to stop using the Zyprexa which is fine.  If he wishes to resume this he can take 2.5 mg.    Follow-up in 4 weeks.          Total time of patient care on day of service including time prior to, face to face with patient, and following visit spent in reviewing records, lab results,  discussion with patient, and documentation/charting was > 41 minutes.                    Tamie Mccullough MD  [facility] Hematology and  Oncology    11/1/2022          CC:

## 2022-11-10 ENCOUNTER — TELEPHONE (OUTPATIENT)
Dept: ONCOLOGY | Facility: CLINIC | Age: 47
End: 2022-11-10

## 2022-11-10 LAB — REF LAB TEST METHOD: NORMAL

## 2022-11-15 ENCOUNTER — INFUSION (OUTPATIENT)
Dept: ONCOLOGY | Facility: HOSPITAL | Age: 47
End: 2022-11-15

## 2022-11-15 VITALS
SYSTOLIC BLOOD PRESSURE: 131 MMHG | HEART RATE: 74 BPM | RESPIRATION RATE: 16 BRPM | BODY MASS INDEX: 26.13 KG/M2 | TEMPERATURE: 98.2 F | OXYGEN SATURATION: 98 % | WEIGHT: 203.5 LBS | DIASTOLIC BLOOD PRESSURE: 76 MMHG

## 2022-11-15 DIAGNOSIS — C81.02 NODULAR LYMPHOCYTE PREDOMINANT HODGKIN LYMPHOMA OF INTRATHORACIC LYMPH NODES: Primary | ICD-10-CM

## 2022-11-15 DIAGNOSIS — C81.92 HODGKIN LYMPHOMA OF INTRATHORACIC LYMPH NODES, UNSPECIFIED HODGKIN LYMPHOMA TYPE: Primary | ICD-10-CM

## 2022-11-15 DIAGNOSIS — C81.02 NODULAR LYMPHOCYTE PREDOMINANT HODGKIN LYMPHOMA OF INTRATHORACIC LYMPH NODES: ICD-10-CM

## 2022-11-15 LAB
ALBUMIN SERPL-MCNC: 4.2 G/DL (ref 3.5–5.2)
ALBUMIN/GLOB SERPL: 1.5 G/DL
ALP SERPL-CCNC: 77 U/L (ref 39–117)
ALT SERPL W P-5'-P-CCNC: 21 U/L (ref 1–41)
ANION GAP SERPL CALCULATED.3IONS-SCNC: 9.3 MMOL/L (ref 5–15)
AST SERPL-CCNC: 20 U/L (ref 1–40)
BASOPHILS # BLD AUTO: 0.05 10*3/MM3 (ref 0–0.2)
BASOPHILS NFR BLD AUTO: 1.8 % (ref 0–1.5)
BILIRUB SERPL-MCNC: <0.2 MG/DL (ref 0–1.2)
BUN SERPL-MCNC: 23 MG/DL (ref 6–20)
BUN/CREAT SERPL: 27.1 (ref 7–25)
CALCIUM SPEC-SCNC: 9 MG/DL (ref 8.6–10.5)
CHLORIDE SERPL-SCNC: 101 MMOL/L (ref 98–107)
CO2 SERPL-SCNC: 27.7 MMOL/L (ref 22–29)
CREAT SERPL-MCNC: 0.85 MG/DL (ref 0.76–1.27)
DEPRECATED RDW RBC AUTO: 61.3 FL (ref 37–54)
EGFRCR SERPLBLD CKD-EPI 2021: 109.2 ML/MIN/1.73
EOSINOPHIL # BLD AUTO: 0.16 10*3/MM3 (ref 0–0.4)
EOSINOPHIL NFR BLD AUTO: 5.7 % (ref 0.3–6.2)
ERYTHROCYTE [DISTWIDTH] IN BLOOD BY AUTOMATED COUNT: 23.3 % (ref 12.3–15.4)
GLOBULIN UR ELPH-MCNC: 2.8 GM/DL
GLUCOSE SERPL-MCNC: 106 MG/DL (ref 65–99)
HCT VFR BLD AUTO: 40.8 % (ref 37.5–51)
HGB BLD-MCNC: 12.6 G/DL (ref 13–17.7)
IMM GRANULOCYTES # BLD AUTO: 0.01 10*3/MM3 (ref 0–0.05)
IMM GRANULOCYTES NFR BLD AUTO: 0.4 % (ref 0–0.5)
LYMPHOCYTES # BLD AUTO: 0.92 10*3/MM3 (ref 0.7–3.1)
LYMPHOCYTES NFR BLD AUTO: 32.7 % (ref 19.6–45.3)
MCH RBC QN AUTO: 23.6 PG (ref 26.6–33)
MCHC RBC AUTO-ENTMCNC: 30.9 G/DL (ref 31.5–35.7)
MCV RBC AUTO: 76.5 FL (ref 79–97)
MONOCYTES # BLD AUTO: 0.46 10*3/MM3 (ref 0.1–0.9)
MONOCYTES NFR BLD AUTO: 16.4 % (ref 5–12)
NEUTROPHILS NFR BLD AUTO: 1.21 10*3/MM3 (ref 1.7–7)
NEUTROPHILS NFR BLD AUTO: 43 % (ref 42.7–76)
NRBC BLD AUTO-RTO: 0 /100 WBC (ref 0–0.2)
PLATELET # BLD AUTO: 222 10*3/MM3 (ref 140–450)
PMV BLD AUTO: 10.3 FL (ref 6–12)
POTASSIUM SERPL-SCNC: 4.3 MMOL/L (ref 3.5–5.2)
PROT SERPL-MCNC: 7 G/DL (ref 6–8.5)
RBC # BLD AUTO: 5.33 10*6/MM3 (ref 4.14–5.8)
SODIUM SERPL-SCNC: 138 MMOL/L (ref 136–145)
WBC NRBC COR # BLD: 2.81 10*3/MM3 (ref 3.4–10.8)

## 2022-11-15 PROCEDURE — 96368 THER/DIAG CONCURRENT INF: CPT

## 2022-11-15 PROCEDURE — 25010000002 DACARBAZINE PER 200 MG: Performed by: INTERNAL MEDICINE

## 2022-11-15 PROCEDURE — 25010000002 DOXORUBICIN PER 10 MG: Performed by: INTERNAL MEDICINE

## 2022-11-15 PROCEDURE — 25010000002 VINBLASTINE PER 1 MG: Performed by: INTERNAL MEDICINE

## 2022-11-15 PROCEDURE — 25010000002 BLEOMYCIN PER 15 UNITS: Performed by: INTERNAL MEDICINE

## 2022-11-15 PROCEDURE — 96366 THER/PROPH/DIAG IV INF ADDON: CPT

## 2022-11-15 PROCEDURE — 96375 TX/PRO/DX INJ NEW DRUG ADDON: CPT

## 2022-11-15 PROCEDURE — 96417 CHEMO IV INFUS EACH ADDL SEQ: CPT

## 2022-11-15 PROCEDURE — 96411 CHEMO IV PUSH ADDL DRUG: CPT

## 2022-11-15 PROCEDURE — 25010000002 DEXAMETHASONE SODIUM PHOSPHATE 20 MG/5ML SOLUTION: Performed by: INTERNAL MEDICINE

## 2022-11-15 PROCEDURE — 96413 CHEMO IV INFUSION 1 HR: CPT

## 2022-11-15 PROCEDURE — 25010000002 FOSAPREPITANT PER 1 MG: Performed by: INTERNAL MEDICINE

## 2022-11-15 PROCEDURE — 25010000002 HEPARIN LOCK FLUSH PER 10 UNITS: Performed by: INTERNAL MEDICINE

## 2022-11-15 PROCEDURE — 96367 TX/PROPH/DG ADDL SEQ IV INF: CPT

## 2022-11-15 PROCEDURE — 85025 COMPLETE CBC W/AUTO DIFF WBC: CPT

## 2022-11-15 PROCEDURE — 25010000002 PALONOSETRON 0.25 MG/5ML SOLUTION PREFILLED SYRINGE: Performed by: INTERNAL MEDICINE

## 2022-11-15 PROCEDURE — 80053 COMPREHEN METABOLIC PANEL: CPT

## 2022-11-15 RX ORDER — PALONOSETRON 0.05 MG/ML
0.25 INJECTION, SOLUTION INTRAVENOUS ONCE
Status: COMPLETED | OUTPATIENT
Start: 2022-11-15 | End: 2022-11-15

## 2022-11-15 RX ORDER — PALONOSETRON 0.05 MG/ML
0.25 INJECTION, SOLUTION INTRAVENOUS ONCE
Status: CANCELLED | OUTPATIENT
Start: 2022-11-15

## 2022-11-15 RX ORDER — HEPARIN SODIUM (PORCINE) LOCK FLUSH IV SOLN 100 UNIT/ML 100 UNIT/ML
500 SOLUTION INTRAVENOUS AS NEEDED
Status: CANCELLED | OUTPATIENT
Start: 2022-11-15

## 2022-11-15 RX ORDER — OLANZAPINE 5 MG/1
5 TABLET ORAL ONCE
Status: CANCELLED | OUTPATIENT
Start: 2022-11-15 | End: 2022-11-15

## 2022-11-15 RX ORDER — OLANZAPINE 5 MG/1
5 TABLET ORAL ONCE
Status: DISCONTINUED | OUTPATIENT
Start: 2022-11-15 | End: 2022-11-15 | Stop reason: HOSPADM

## 2022-11-15 RX ORDER — DOXORUBICIN HYDROCHLORIDE 2 MG/ML
25 INJECTION, SOLUTION INTRAVENOUS ONCE
Status: CANCELLED | OUTPATIENT
Start: 2022-11-15

## 2022-11-15 RX ORDER — DOXORUBICIN HYDROCHLORIDE 2 MG/ML
25 INJECTION, SOLUTION INTRAVENOUS ONCE
Status: COMPLETED | OUTPATIENT
Start: 2022-11-15 | End: 2022-11-15

## 2022-11-15 RX ORDER — SODIUM CHLORIDE 9 MG/ML
250 INJECTION, SOLUTION INTRAVENOUS ONCE
Status: DISCONTINUED | OUTPATIENT
Start: 2022-11-15 | End: 2022-11-15 | Stop reason: HOSPADM

## 2022-11-15 RX ORDER — HEPARIN SODIUM (PORCINE) LOCK FLUSH IV SOLN 100 UNIT/ML 100 UNIT/ML
500 SOLUTION INTRAVENOUS AS NEEDED
Status: DISCONTINUED | OUTPATIENT
Start: 2022-11-15 | End: 2022-11-15 | Stop reason: HOSPADM

## 2022-11-15 RX ORDER — SODIUM CHLORIDE 9 MG/ML
250 INJECTION, SOLUTION INTRAVENOUS ONCE
Status: CANCELLED | OUTPATIENT
Start: 2022-11-15

## 2022-11-15 RX ADMIN — BLEOMYCIN SULFATE 22 UNITS: 30 POWDER, FOR SOLUTION INTRAMUSCULAR; INTRAPLEURAL; INTRAVENOUS; SUBCUTANEOUS at 14:45

## 2022-11-15 RX ADMIN — DACARBAZINE 820 MG: 10 INJECTION, POWDER, FOR SOLUTION INTRAVENOUS at 15:02

## 2022-11-15 RX ADMIN — PALONOSETRON 0.25 MG: 0.25 INJECTION, SOLUTION INTRAVENOUS at 13:48

## 2022-11-15 RX ADMIN — HEPARIN 500 UNITS: 100 SYRINGE at 15:39

## 2022-11-15 RX ADMIN — VINBLASTINE SULFATE 13 MG: 1 INJECTION INTRAVENOUS at 14:24

## 2022-11-15 RX ADMIN — FOSAPREPITANT 150 MG: 150 INJECTION, POWDER, LYOPHILIZED, FOR SOLUTION INTRAVENOUS at 13:22

## 2022-11-15 RX ADMIN — DOXORUBICIN HYDROCHLORIDE 54 MG: 2 INJECTION, SOLUTION INTRAVENOUS at 14:04

## 2022-11-15 RX ADMIN — DEXAMETHASONE SODIUM PHOSPHATE 12 MG: 4 INJECTION, SOLUTION INTRAMUSCULAR; INTRAVENOUS at 13:22

## 2022-11-29 ENCOUNTER — OFFICE VISIT (OUTPATIENT)
Dept: ONCOLOGY | Facility: CLINIC | Age: 47
End: 2022-11-29

## 2022-11-29 ENCOUNTER — INFUSION (OUTPATIENT)
Dept: ONCOLOGY | Facility: HOSPITAL | Age: 47
End: 2022-11-29

## 2022-11-29 VITALS
DIASTOLIC BLOOD PRESSURE: 78 MMHG | BODY MASS INDEX: 26.31 KG/M2 | TEMPERATURE: 98 F | HEART RATE: 78 BPM | SYSTOLIC BLOOD PRESSURE: 136 MMHG | OXYGEN SATURATION: 98 % | WEIGHT: 205 LBS | HEIGHT: 74 IN | RESPIRATION RATE: 12 BRPM

## 2022-11-29 DIAGNOSIS — C81.02 NODULAR LYMPHOCYTE PREDOMINANT HODGKIN LYMPHOMA OF INTRATHORACIC LYMPH NODES: ICD-10-CM

## 2022-11-29 DIAGNOSIS — C81.12 NODULAR SCLEROSIS HODGKIN LYMPHOMA OF INTRATHORACIC LYMPH NODES: Primary | ICD-10-CM

## 2022-11-29 DIAGNOSIS — C81.92 HODGKIN LYMPHOMA OF INTRATHORACIC LYMPH NODES, UNSPECIFIED HODGKIN LYMPHOMA TYPE: ICD-10-CM

## 2022-11-29 DIAGNOSIS — C81.02 NODULAR LYMPHOCYTE PREDOMINANT HODGKIN LYMPHOMA OF INTRATHORACIC LYMPH NODES: Primary | ICD-10-CM

## 2022-11-29 PROBLEM — R53.81 MALAISE AND FATIGUE: Status: RESOLVED | Noted: 2022-07-20 | Resolved: 2022-11-29

## 2022-11-29 PROBLEM — R53.83 MALAISE AND FATIGUE: Status: RESOLVED | Noted: 2022-07-20 | Resolved: 2022-11-29

## 2022-11-29 PROBLEM — R59.0 LYMPHADENOPATHY, SUPRACLAVICULAR: Status: RESOLVED | Noted: 2022-09-23 | Resolved: 2022-11-29

## 2022-11-29 LAB
ALBUMIN SERPL-MCNC: 4.4 G/DL (ref 3.5–5.2)
ALBUMIN/GLOB SERPL: 1.5 G/DL
ALP SERPL-CCNC: 74 U/L (ref 39–117)
ALT SERPL W P-5'-P-CCNC: 24 U/L (ref 1–41)
ANION GAP SERPL CALCULATED.3IONS-SCNC: 9.4 MMOL/L (ref 5–15)
ANISOCYTOSIS BLD QL: NORMAL
AST SERPL-CCNC: 24 U/L (ref 1–40)
BASOPHILS # BLD AUTO: 0.02 10*3/MM3 (ref 0–0.2)
BASOPHILS NFR BLD AUTO: 0.6 % (ref 0–1.5)
BILIRUB SERPL-MCNC: <0.2 MG/DL (ref 0–1.2)
BUN SERPL-MCNC: 22 MG/DL (ref 6–20)
BUN/CREAT SERPL: 25.9 (ref 7–25)
CALCIUM SPEC-SCNC: 9.6 MG/DL (ref 8.6–10.5)
CHLORIDE SERPL-SCNC: 102 MMOL/L (ref 98–107)
CO2 SERPL-SCNC: 27.6 MMOL/L (ref 22–29)
CREAT SERPL-MCNC: 0.85 MG/DL (ref 0.76–1.27)
DEPRECATED RDW RBC AUTO: 62.4 FL (ref 37–54)
EGFRCR SERPLBLD CKD-EPI 2021: 109.2 ML/MIN/1.73
EOSINOPHIL # BLD AUTO: 0.13 10*3/MM3 (ref 0–0.4)
EOSINOPHIL NFR BLD AUTO: 4 % (ref 0.3–6.2)
ERYTHROCYTE [DISTWIDTH] IN BLOOD BY AUTOMATED COUNT: 23.4 % (ref 12.3–15.4)
GLOBULIN UR ELPH-MCNC: 2.9 GM/DL
GLUCOSE SERPL-MCNC: 99 MG/DL (ref 65–99)
HCT VFR BLD AUTO: 42.3 % (ref 37.5–51)
HGB BLD-MCNC: 13.2 G/DL (ref 13–17.7)
IMM GRANULOCYTES # BLD AUTO: 0.02 10*3/MM3 (ref 0–0.05)
IMM GRANULOCYTES NFR BLD AUTO: 0.6 % (ref 0–0.5)
LYMPHOCYTES # BLD AUTO: 0.79 10*3/MM3 (ref 0.7–3.1)
LYMPHOCYTES NFR BLD AUTO: 24.2 % (ref 19.6–45.3)
MACROCYTES BLD QL SMEAR: NORMAL
MCH RBC QN AUTO: 24 PG (ref 26.6–33)
MCHC RBC AUTO-ENTMCNC: 31.2 G/DL (ref 31.5–35.7)
MCV RBC AUTO: 77 FL (ref 79–97)
MICROCYTES BLD QL: NORMAL
MONOCYTES # BLD AUTO: 0.62 10*3/MM3 (ref 0.1–0.9)
MONOCYTES NFR BLD AUTO: 19 % (ref 5–12)
NEUTROPHILS NFR BLD AUTO: 1.68 10*3/MM3 (ref 1.7–7)
NEUTROPHILS NFR BLD AUTO: 51.6 % (ref 42.7–76)
NRBC BLD AUTO-RTO: 0 /100 WBC (ref 0–0.2)
PLATELET # BLD AUTO: 193 10*3/MM3 (ref 140–450)
PMV BLD AUTO: 10.3 FL (ref 6–12)
POTASSIUM SERPL-SCNC: 4.2 MMOL/L (ref 3.5–5.2)
PROT SERPL-MCNC: 7.3 G/DL (ref 6–8.5)
RBC # BLD AUTO: 5.49 10*6/MM3 (ref 4.14–5.8)
SMALL PLATELETS BLD QL SMEAR: ADEQUATE
SODIUM SERPL-SCNC: 139 MMOL/L (ref 136–145)
WBC MORPH BLD: NORMAL
WBC NRBC COR # BLD: 3.26 10*3/MM3 (ref 3.4–10.8)

## 2022-11-29 PROCEDURE — 25010000002 DOXORUBICIN PER 10 MG: Performed by: INTERNAL MEDICINE

## 2022-11-29 PROCEDURE — 25010000002 FOSAPREPITANT PER 1 MG: Performed by: INTERNAL MEDICINE

## 2022-11-29 PROCEDURE — 96366 THER/PROPH/DIAG IV INF ADDON: CPT

## 2022-11-29 PROCEDURE — 25010000002 PALONOSETRON 0.25 MG/5ML SOLUTION PREFILLED SYRINGE: Performed by: INTERNAL MEDICINE

## 2022-11-29 PROCEDURE — 85025 COMPLETE CBC W/AUTO DIFF WBC: CPT

## 2022-11-29 PROCEDURE — 99214 OFFICE O/P EST MOD 30 MIN: CPT | Performed by: INTERNAL MEDICINE

## 2022-11-29 PROCEDURE — 96409 CHEMO IV PUSH SNGL DRUG: CPT

## 2022-11-29 PROCEDURE — 25010000002 VINBLASTINE PER 1 MG: Performed by: INTERNAL MEDICINE

## 2022-11-29 PROCEDURE — 85007 BL SMEAR W/DIFF WBC COUNT: CPT

## 2022-11-29 PROCEDURE — 96417 CHEMO IV INFUS EACH ADDL SEQ: CPT

## 2022-11-29 PROCEDURE — 96413 CHEMO IV INFUSION 1 HR: CPT

## 2022-11-29 PROCEDURE — 96375 TX/PRO/DX INJ NEW DRUG ADDON: CPT

## 2022-11-29 PROCEDURE — 80053 COMPREHEN METABOLIC PANEL: CPT

## 2022-11-29 PROCEDURE — 25010000002 BLEOMYCIN PER 15 UNITS: Performed by: INTERNAL MEDICINE

## 2022-11-29 PROCEDURE — 25010000002 DACARBAZINE PER 200 MG: Performed by: INTERNAL MEDICINE

## 2022-11-29 PROCEDURE — 36415 COLL VENOUS BLD VENIPUNCTURE: CPT

## 2022-11-29 PROCEDURE — 25010000002 DEXAMETHASONE SODIUM PHOSPHATE 20 MG/5ML SOLUTION: Performed by: INTERNAL MEDICINE

## 2022-11-29 PROCEDURE — 25010000002 HEPARIN LOCK FLUSH PER 10 UNITS: Performed by: INTERNAL MEDICINE

## 2022-11-29 PROCEDURE — 96411 CHEMO IV PUSH ADDL DRUG: CPT

## 2022-11-29 PROCEDURE — 96367 TX/PROPH/DG ADDL SEQ IV INF: CPT

## 2022-11-29 RX ORDER — SODIUM CHLORIDE 9 MG/ML
250 INJECTION, SOLUTION INTRAVENOUS ONCE
Status: CANCELLED | OUTPATIENT
Start: 2022-11-29

## 2022-11-29 RX ORDER — PALONOSETRON 0.05 MG/ML
0.25 INJECTION, SOLUTION INTRAVENOUS ONCE
Status: COMPLETED | OUTPATIENT
Start: 2022-11-29 | End: 2022-11-29

## 2022-11-29 RX ORDER — SODIUM CHLORIDE 9 MG/ML
250 INJECTION, SOLUTION INTRAVENOUS ONCE
Status: DISCONTINUED | OUTPATIENT
Start: 2022-11-29 | End: 2022-11-29 | Stop reason: HOSPADM

## 2022-11-29 RX ORDER — DOXORUBICIN HYDROCHLORIDE 2 MG/ML
25 INJECTION, SOLUTION INTRAVENOUS ONCE
Status: COMPLETED | OUTPATIENT
Start: 2022-11-29 | End: 2022-11-29

## 2022-11-29 RX ORDER — OLANZAPINE 5 MG/1
5 TABLET ORAL ONCE
Status: DISCONTINUED | OUTPATIENT
Start: 2022-11-29 | End: 2022-11-29 | Stop reason: HOSPADM

## 2022-11-29 RX ORDER — DOXORUBICIN HYDROCHLORIDE 2 MG/ML
25 INJECTION, SOLUTION INTRAVENOUS ONCE
Status: CANCELLED | OUTPATIENT
Start: 2022-11-29

## 2022-11-29 RX ORDER — PALONOSETRON 0.05 MG/ML
0.25 INJECTION, SOLUTION INTRAVENOUS ONCE
Status: CANCELLED | OUTPATIENT
Start: 2022-11-29

## 2022-11-29 RX ORDER — HEPARIN SODIUM (PORCINE) LOCK FLUSH IV SOLN 100 UNIT/ML 100 UNIT/ML
500 SOLUTION INTRAVENOUS AS NEEDED
Status: CANCELLED | OUTPATIENT
Start: 2022-11-29

## 2022-11-29 RX ORDER — OLANZAPINE 5 MG/1
5 TABLET ORAL ONCE
Status: CANCELLED | OUTPATIENT
Start: 2022-11-29 | End: 2022-11-29

## 2022-11-29 RX ORDER — HEPARIN SODIUM (PORCINE) LOCK FLUSH IV SOLN 100 UNIT/ML 100 UNIT/ML
500 SOLUTION INTRAVENOUS AS NEEDED
Status: DISCONTINUED | OUTPATIENT
Start: 2022-11-29 | End: 2022-11-29 | Stop reason: HOSPADM

## 2022-11-29 RX ADMIN — BLEOMYCIN SULFATE 22 UNITS: 30 POWDER, FOR SOLUTION INTRAMUSCULAR; INTRAPLEURAL; INTRAVENOUS; SUBCUTANEOUS at 13:42

## 2022-11-29 RX ADMIN — FOSAPREPITANT 150 MG: 150 INJECTION, POWDER, LYOPHILIZED, FOR SOLUTION INTRAVENOUS at 12:28

## 2022-11-29 RX ADMIN — HEPARIN 500 UNITS: 100 SYRINGE at 14:31

## 2022-11-29 RX ADMIN — DOXORUBICIN HYDROCHLORIDE 54 MG: 2 INJECTION, SOLUTION INTRAVENOUS at 13:07

## 2022-11-29 RX ADMIN — PALONOSETRON 0.25 MG: 0.25 INJECTION, SOLUTION INTRAVENOUS at 12:26

## 2022-11-29 RX ADMIN — VINBLASTINE SULFATE 13 MG: 1 INJECTION INTRAVENOUS at 13:22

## 2022-11-29 RX ADMIN — DEXAMETHASONE SODIUM PHOSPHATE 12 MG: 4 INJECTION, SOLUTION INTRAMUSCULAR; INTRAVENOUS at 12:48

## 2022-11-29 RX ADMIN — DACARBAZINE 820 MG: 10 INJECTION, POWDER, FOR SOLUTION INTRAVENOUS at 13:57

## 2022-11-29 NOTE — PROGRESS NOTES
"      PROBLEM LIST:  1.  Hodgkin lymphoma, stage IIB  A) bone marrow biopsy done for chronic anemia 8/12/2022 showed a normocellular bone marrow with maturing trilineage hematopoiesis, increased stainable marrow iron, no evidence of dysplasia, fibrosis, or increased blasts.  Labs showed no evidence of nutrient deficiency, or heavy metal toxicity.  B) CT chest abdomen pelvis on 9/7/2022 showed extensive intrathoracic and supraclavicular lymphadenopathy, largest superior mediastinal lymph node 3.8 cm, left supraclavicular lymph node 2.8 cm, right supraclavicular lymph node 2.7 cm.  Hypodense lesions in the liver most likely benign cysts.  C) supraclavicular lymph node biopsy on 9/30/2022 showed nodular sclerosing Hodgkin lymphoma.  PET/CT on 10/10/2022 showed enlarged and hypermetabolic mediastinal lymphadenopathy, contiguous to lower cervical regions.  No other sites of disease.  D) treatment with ABVD started 10/18/2022.  2. Weight loss    Subjective     CHIEF COMPLAINT: anemia    HISTORY OF PRESENT ILLNESS:   William Maria returns for follow-up.  He continues to do well.  His exercise tolerance has improved.  He is walking with jogging most days.  No difficulty with breathing.  He is focusing on his diet and trying to avoid sugar.      Objective      /78   Pulse 78   Temp 98 °F (36.7 °C) (Temporal)   Resp 12   Ht 188 cm (74\")   Wt 93 kg (205 lb)   SpO2 98%   BMI 26.32 kg/m²      Performance Status:0                General: well appearing male in no acute distress  Neuro: alert and oriented  HEENT: sclera anicteric  Cardiovascular: Regular rate and rhythm  Lungs: Clear to auscultation bilaterally  Extremeties: no lower extremity edema  Psych: mood and affect appropriate          RECENT LABS:    Lab Results   Component Value Date    WBC 2.81 (L) 11/15/2022    HGB 12.6 (L) 11/15/2022    HCT 40.8 11/15/2022    MCV 76.5 (L) 11/15/2022     11/15/2022     Lab Results   Component Value Date    " GLUCOSE 106 (H) 11/15/2022    BUN 23 (H) 11/15/2022    CREATININE 0.85 11/15/2022    BCR 27.1 (H) 11/15/2022    K 4.3 11/15/2022    CO2 27.7 11/15/2022    CALCIUM 9.0 11/15/2022    PROTENTOTREF 7.1 07/12/2022    ALBUMIN 4.20 11/15/2022    LABIL2 0.8 07/12/2022    AST 20 11/15/2022    ALT 21 11/15/2022                 ASSESSMENT AND PLAN:     William Maria is a 47 y.o. male with stage IIb Hodgkin lymphoma, here for follow-up.    Hodgkin lymphoma: He continues to do well with treatment.  We will continue with day 15 cycle 2 ABVD.  He will have an interim PET scan following 2 complete cycles and this is scheduled for December 8.      We again discussed options for his PET scan looks good.  We could proceed with 4 additional cycles of chemotherapy, versus 2 cycles plus involved field radiotherapy.  We discussed that short-term disease-free survival is improved with radiotherapy, but there may be some increased risk of longer-term side effects leading cardiotoxicity or secondary malignancy.  We discussed the potential scheduled radiation treatment.  He is interested in meeting with radiation oncology to further discuss specifics of treatment.  I will refer him to see Dr. Johnson.    Follow-up in 2 weeks.          Total time of patient care on day of service including time prior to, face to face with patient, and following visit spent in reviewing records, lab results,  discussion with patient, and documentation/charting was > 33 minutes.                    Tamie Mccullough MD  [facility] Hematology and Oncology    11/29/2022          CC:

## 2022-12-08 ENCOUNTER — HOSPITAL ENCOUNTER (OUTPATIENT)
Dept: PET IMAGING | Facility: HOSPITAL | Age: 47
Discharge: HOME | End: 2022-12-08

## 2022-12-08 DIAGNOSIS — C81.12 NODULAR SCLEROSIS HODGKIN LYMPHOMA OF INTRATHORACIC LYMPH NODES: ICD-10-CM

## 2022-12-08 LAB — GLUCOSE BLDC GLUCOMTR-MCNC: 103 MG/DL (ref 70–130)

## 2022-12-08 PROCEDURE — 0 FLUDEOXYGLUCOSE F18 SOLUTION: Performed by: INTERNAL MEDICINE

## 2022-12-08 PROCEDURE — 82962 GLUCOSE BLOOD TEST: CPT

## 2022-12-08 PROCEDURE — 78815 PET IMAGE W/CT SKULL-THIGH: CPT

## 2022-12-08 PROCEDURE — A9552 F18 FDG: HCPCS | Performed by: INTERNAL MEDICINE

## 2022-12-08 RX ADMIN — FLUDEOXYGLUCOSE F18 1 DOSE: 300 INJECTION INTRAVENOUS at 08:56

## 2022-12-12 ENCOUNTER — TELEPHONE (OUTPATIENT)
Dept: RADIATION ONCOLOGY | Facility: HOSPITAL | Age: 47
End: 2022-12-12

## 2022-12-12 NOTE — TELEPHONE ENCOUNTER
Confirmed patient's appointment with Dr Wyman for 12/14/22 at 10:00 am.  All imaging performed within [facility] and doesn't need paperwork.

## 2022-12-13 ENCOUNTER — INFUSION (OUTPATIENT)
Dept: ONCOLOGY | Facility: HOSPITAL | Age: 47
End: 2022-12-13

## 2022-12-13 ENCOUNTER — OFFICE VISIT (OUTPATIENT)
Dept: ONCOLOGY | Facility: CLINIC | Age: 47
End: 2022-12-13

## 2022-12-13 VITALS
OXYGEN SATURATION: 98 % | BODY MASS INDEX: 26.69 KG/M2 | SYSTOLIC BLOOD PRESSURE: 125 MMHG | RESPIRATION RATE: 12 BRPM | HEIGHT: 74 IN | DIASTOLIC BLOOD PRESSURE: 74 MMHG | HEART RATE: 72 BPM | WEIGHT: 208 LBS | TEMPERATURE: 97.7 F

## 2022-12-13 DIAGNOSIS — C81.02 NODULAR LYMPHOCYTE PREDOMINANT HODGKIN LYMPHOMA OF INTRATHORACIC LYMPH NODES: Primary | ICD-10-CM

## 2022-12-13 DIAGNOSIS — C81.92 HODGKIN LYMPHOMA OF INTRATHORACIC LYMPH NODES, UNSPECIFIED HODGKIN LYMPHOMA TYPE: ICD-10-CM

## 2022-12-13 LAB
ALBUMIN SERPL-MCNC: 4.4 G/DL (ref 3.5–5.2)
ALBUMIN/GLOB SERPL: 1.6 G/DL
ALP SERPL-CCNC: 69 U/L (ref 39–117)
ALT SERPL W P-5'-P-CCNC: 20 U/L (ref 1–41)
ANION GAP SERPL CALCULATED.3IONS-SCNC: 8.4 MMOL/L (ref 5–15)
ANISOCYTOSIS BLD QL: NORMAL
AST SERPL-CCNC: 25 U/L (ref 1–40)
BASOPHILS # BLD AUTO: 0.03 10*3/MM3 (ref 0–0.2)
BASOPHILS NFR BLD AUTO: 1.1 % (ref 0–1.5)
BILIRUB SERPL-MCNC: <0.2 MG/DL (ref 0–1.2)
BUN SERPL-MCNC: 21 MG/DL (ref 6–20)
BUN/CREAT SERPL: 20.6 (ref 7–25)
CALCIUM SPEC-SCNC: 9.5 MG/DL (ref 8.6–10.5)
CHLORIDE SERPL-SCNC: 100 MMOL/L (ref 98–107)
CO2 SERPL-SCNC: 27.6 MMOL/L (ref 22–29)
CREAT SERPL-MCNC: 1.02 MG/DL (ref 0.76–1.27)
DEPRECATED RDW RBC AUTO: 64.3 FL (ref 37–54)
EGFRCR SERPLBLD CKD-EPI 2021: 92.4 ML/MIN/1.73
EOSINOPHIL # BLD AUTO: 0.16 10*3/MM3 (ref 0–0.4)
EOSINOPHIL NFR BLD AUTO: 6.1 % (ref 0.3–6.2)
ERYTHROCYTE [DISTWIDTH] IN BLOOD BY AUTOMATED COUNT: 23.2 % (ref 12.3–15.4)
GLOBULIN UR ELPH-MCNC: 2.8 GM/DL
GLUCOSE SERPL-MCNC: 111 MG/DL (ref 65–99)
HCT VFR BLD AUTO: 41.3 % (ref 37.5–51)
HGB BLD-MCNC: 13.1 G/DL (ref 13–17.7)
HYPOCHROMIA BLD QL: NORMAL
IMM GRANULOCYTES # BLD AUTO: 0.01 10*3/MM3 (ref 0–0.05)
IMM GRANULOCYTES NFR BLD AUTO: 0.4 % (ref 0–0.5)
LYMPHOCYTES # BLD AUTO: 0.67 10*3/MM3 (ref 0.7–3.1)
LYMPHOCYTES NFR BLD AUTO: 25.5 % (ref 19.6–45.3)
MCH RBC QN AUTO: 24.9 PG (ref 26.6–33)
MCHC RBC AUTO-ENTMCNC: 31.7 G/DL (ref 31.5–35.7)
MCV RBC AUTO: 78.5 FL (ref 79–97)
MONOCYTES # BLD AUTO: 0.55 10*3/MM3 (ref 0.1–0.9)
MONOCYTES NFR BLD AUTO: 20.9 % (ref 5–12)
NEUTROPHILS NFR BLD AUTO: 1.21 10*3/MM3 (ref 1.7–7)
NEUTROPHILS NFR BLD AUTO: 46 % (ref 42.7–76)
NRBC BLD AUTO-RTO: 0 /100 WBC (ref 0–0.2)
PLAT MORPH BLD: NORMAL
PLATELET # BLD AUTO: 235 10*3/MM3 (ref 140–450)
PMV BLD AUTO: 10.6 FL (ref 6–12)
POTASSIUM SERPL-SCNC: 4.4 MMOL/L (ref 3.5–5.2)
PROT SERPL-MCNC: 7.2 G/DL (ref 6–8.5)
RBC # BLD AUTO: 5.26 10*6/MM3 (ref 4.14–5.8)
SODIUM SERPL-SCNC: 136 MMOL/L (ref 136–145)
WBC MORPH BLD: NORMAL
WBC NRBC COR # BLD: 2.63 10*3/MM3 (ref 3.4–10.8)

## 2022-12-13 PROCEDURE — 96376 TX/PRO/DX INJ SAME DRUG ADON: CPT

## 2022-12-13 PROCEDURE — 96367 TX/PROPH/DG ADDL SEQ IV INF: CPT

## 2022-12-13 PROCEDURE — 25010000002 VINBLASTINE PER 1 MG: Performed by: INTERNAL MEDICINE

## 2022-12-13 PROCEDURE — 25010000002 DOXORUBICIN PER 10 MG: Performed by: INTERNAL MEDICINE

## 2022-12-13 PROCEDURE — 99214 OFFICE O/P EST MOD 30 MIN: CPT | Performed by: INTERNAL MEDICINE

## 2022-12-13 PROCEDURE — 25010000002 HEPARIN LOCK FLUSH PER 10 UNITS

## 2022-12-13 PROCEDURE — 96413 CHEMO IV INFUSION 1 HR: CPT

## 2022-12-13 PROCEDURE — 96411 CHEMO IV PUSH ADDL DRUG: CPT

## 2022-12-13 PROCEDURE — 25010000002 BLEOMYCIN PER 15 UNITS: Performed by: INTERNAL MEDICINE

## 2022-12-13 PROCEDURE — 85007 BL SMEAR W/DIFF WBC COUNT: CPT

## 2022-12-13 PROCEDURE — 96417 CHEMO IV INFUS EACH ADDL SEQ: CPT

## 2022-12-13 PROCEDURE — 96375 TX/PRO/DX INJ NEW DRUG ADDON: CPT

## 2022-12-13 PROCEDURE — 96415 CHEMO IV INFUSION ADDL HR: CPT

## 2022-12-13 PROCEDURE — 25010000002 DEXAMETHASONE SODIUM PHOSPHATE 20 MG/5ML SOLUTION: Performed by: INTERNAL MEDICINE

## 2022-12-13 PROCEDURE — 25010000002 FOSAPREPITANT PER 1 MG: Performed by: INTERNAL MEDICINE

## 2022-12-13 PROCEDURE — 25010000002 PALONOSETRON 0.25 MG/5ML SOLUTION PREFILLED SYRINGE: Performed by: INTERNAL MEDICINE

## 2022-12-13 PROCEDURE — 85025 COMPLETE CBC W/AUTO DIFF WBC: CPT

## 2022-12-13 PROCEDURE — 25010000002 DACARBAZINE PER 200 MG: Performed by: INTERNAL MEDICINE

## 2022-12-13 PROCEDURE — 80053 COMPREHEN METABOLIC PANEL: CPT

## 2022-12-13 PROCEDURE — 96409 CHEMO IV PUSH SNGL DRUG: CPT

## 2022-12-13 RX ORDER — SODIUM CHLORIDE 9 MG/ML
250 INJECTION, SOLUTION INTRAVENOUS ONCE
Status: CANCELLED | OUTPATIENT
Start: 2022-12-13

## 2022-12-13 RX ORDER — OLANZAPINE 5 MG/1
5 TABLET ORAL ONCE
Status: DISCONTINUED | OUTPATIENT
Start: 2022-12-13 | End: 2022-12-13 | Stop reason: HOSPADM

## 2022-12-13 RX ORDER — DOXORUBICIN HYDROCHLORIDE 2 MG/ML
25 INJECTION, SOLUTION INTRAVENOUS ONCE
Status: COMPLETED | OUTPATIENT
Start: 2022-12-13 | End: 2022-12-13

## 2022-12-13 RX ORDER — SODIUM CHLORIDE 9 MG/ML
250 INJECTION, SOLUTION INTRAVENOUS ONCE
Status: DISCONTINUED | OUTPATIENT
Start: 2022-12-13 | End: 2022-12-13 | Stop reason: HOSPADM

## 2022-12-13 RX ORDER — PALONOSETRON 0.05 MG/ML
0.25 INJECTION, SOLUTION INTRAVENOUS ONCE
Status: COMPLETED | OUTPATIENT
Start: 2022-12-13 | End: 2022-12-13

## 2022-12-13 RX ORDER — OLANZAPINE 5 MG/1
5 TABLET ORAL ONCE
Status: CANCELLED | OUTPATIENT
Start: 2022-12-13 | End: 2022-12-13

## 2022-12-13 RX ORDER — SODIUM CHLORIDE 9 MG/ML
250 INJECTION, SOLUTION INTRAVENOUS ONCE
Status: CANCELLED | OUTPATIENT
Start: 2022-12-27

## 2022-12-13 RX ORDER — PALONOSETRON 0.05 MG/ML
0.25 INJECTION, SOLUTION INTRAVENOUS ONCE
Status: CANCELLED | OUTPATIENT
Start: 2022-12-27

## 2022-12-13 RX ORDER — OLANZAPINE 5 MG/1
5 TABLET ORAL ONCE
Status: CANCELLED | OUTPATIENT
Start: 2022-12-27 | End: 2022-12-27

## 2022-12-13 RX ORDER — HEPARIN SODIUM (PORCINE) LOCK FLUSH IV SOLN 100 UNIT/ML 100 UNIT/ML
500 SOLUTION INTRAVENOUS AS NEEDED
Status: CANCELLED | OUTPATIENT
Start: 2022-12-13

## 2022-12-13 RX ORDER — DOXORUBICIN HYDROCHLORIDE 2 MG/ML
25 INJECTION, SOLUTION INTRAVENOUS ONCE
Status: CANCELLED | OUTPATIENT
Start: 2022-12-13

## 2022-12-13 RX ORDER — HEPARIN SODIUM (PORCINE) LOCK FLUSH IV SOLN 100 UNIT/ML 100 UNIT/ML
SOLUTION INTRAVENOUS
Status: COMPLETED
Start: 2022-12-13 | End: 2022-12-13

## 2022-12-13 RX ORDER — PALONOSETRON 0.05 MG/ML
0.25 INJECTION, SOLUTION INTRAVENOUS ONCE
Status: CANCELLED | OUTPATIENT
Start: 2022-12-13

## 2022-12-13 RX ORDER — HEPARIN SODIUM (PORCINE) LOCK FLUSH IV SOLN 100 UNIT/ML 100 UNIT/ML
500 SOLUTION INTRAVENOUS AS NEEDED
Status: DISCONTINUED | OUTPATIENT
Start: 2022-12-13 | End: 2022-12-13 | Stop reason: HOSPADM

## 2022-12-13 RX ORDER — DOXORUBICIN HYDROCHLORIDE 2 MG/ML
25 INJECTION, SOLUTION INTRAVENOUS ONCE
Status: CANCELLED | OUTPATIENT
Start: 2022-12-27

## 2022-12-13 RX ADMIN — DOXORUBICIN HYDROCHLORIDE 54 MG: 2 INJECTION, SOLUTION INTRAVENOUS at 14:01

## 2022-12-13 RX ADMIN — BLEOMYCIN SULFATE 22 UNITS: 30 POWDER, FOR SOLUTION INTRAMUSCULAR; INTRAPLEURAL; INTRAVENOUS; SUBCUTANEOUS at 14:36

## 2022-12-13 RX ADMIN — FOSAPREPITANT 150 MG: 150 INJECTION, POWDER, LYOPHILIZED, FOR SOLUTION INTRAVENOUS at 13:03

## 2022-12-13 RX ADMIN — VINBLASTINE SULFATE 13 MG: 1 INJECTION INTRAVENOUS at 14:16

## 2022-12-13 RX ADMIN — HEPARIN SODIUM (PORCINE) LOCK FLUSH IV SOLN 100 UNIT/ML 500 UNITS: 100 SOLUTION at 15:30

## 2022-12-13 RX ADMIN — DEXAMETHASONE SODIUM PHOSPHATE 12 MG: 4 INJECTION, SOLUTION INTRAMUSCULAR; INTRAVENOUS at 13:04

## 2022-12-13 RX ADMIN — DACARBAZINE 820 MG: 10 INJECTION, POWDER, FOR SOLUTION INTRAVENOUS at 14:54

## 2022-12-13 RX ADMIN — HEPARIN 500 UNITS: 100 SYRINGE at 15:30

## 2022-12-13 RX ADMIN — PALONOSETRON 0.25 MG: 0.25 INJECTION, SOLUTION INTRAVENOUS at 13:33

## 2022-12-13 NOTE — PROGRESS NOTES
"      PROBLEM LIST:  1.  Hodgkin lymphoma, stage IIB  A) bone marrow biopsy done for chronic anemia 8/12/2022 showed a normocellular bone marrow with maturing trilineage hematopoiesis, increased stainable marrow iron, no evidence of dysplasia, fibrosis, or increased blasts.  Labs showed no evidence of nutrient deficiency, or heavy metal toxicity.  B) CT chest abdomen pelvis on 9/7/2022 showed extensive intrathoracic and supraclavicular lymphadenopathy, largest superior mediastinal lymph node 3.8 cm, left supraclavicular lymph node 2.8 cm, right supraclavicular lymph node 2.7 cm.  Hypodense lesions in the liver most likely benign cysts.  C) supraclavicular lymph node biopsy on 9/30/2022 showed nodular sclerosing Hodgkin lymphoma.  PET/CT on 10/10/2022 showed enlarged and hypermetabolic mediastinal lymphadenopathy, contiguous to lower cervical regions.  No other sites of disease.  D) treatment with ABVD started 10/18/2022.  2. Weight loss    Subjective     CHIEF COMPLAINT: anemia    HISTORY OF PRESENT ILLNESS:   William Maria returns for follow-up.  He continues to feel well.  No new concerns or complaints.      Objective      /74   Pulse 72   Temp 97.7 °F (36.5 °C) (Temporal)   Resp 12   Ht 188 cm (74\")   Wt 94.3 kg (208 lb)   SpO2 98%   BMI 26.71 kg/m²      Performance Status:0                General: well appearing male in no acute distress  Neuro: alert and oriented  HEENT: sclera anicteric  Lymphatics: No palpable cervical or supraclavicular adenopathy  Cardiovascular: Regular rate and rhythm  Lungs: Clear to auscultation bilaterally  Extremeties: no lower extremity edema  Psych: mood and affect appropriate          RECENT LABS:    Lab Results   Component Value Date    WBC 3.26 (L) 11/29/2022    HGB 13.2 11/29/2022    HCT 42.3 11/29/2022    MCV 77.0 (L) 11/29/2022     11/29/2022     Lab Results   Component Value Date    GLUCOSE 99 11/29/2022    BUN 22 (H) 11/29/2022    CREATININE 0.85 " 11/29/2022    BCR 25.9 (H) 11/29/2022    K 4.2 11/29/2022    CO2 27.6 11/29/2022    CALCIUM 9.6 11/29/2022    PROTENTOTREF 7.1 07/12/2022    ALBUMIN 4.40 11/29/2022    LABIL2 0.8 07/12/2022    AST 24 11/29/2022    ALT 24 11/29/2022       NM PET/CT Skull Base to Mid Thigh  Narrative: DATE OF EXAM: 12/8/2022 9:02 AM     PROCEDURE: NM PET/CT SKULL BASE TO MID THIGH-     INDICATIONS: hodgkin lymphoma; C81.12-Nodular sclerosis Hodgkin  lymphoma, intrathoracic lymph nodes     TECHNIQUE: 13.42 mCi of F-18 labeled FDG was administered intravenously  followed by PET imaging from the skull vertex through the mid thighs.  Low-dose non contrast CT imaging of the same body region was performed.  Fused PET-CT images and 3D MIP PET images were utilized for  interpretation. Blood glucose level at the time of injection was 103  mg/dl.     COMPARISON: 10/10/2022     HEAD AND NECK: Physiologic hypermetabolism of the aerodigestive tract  structures is present, without hypermetabolic cervical adenopathy or  aerodigestive tract mass. Normal hypermetabolism of the cerebral  hemispheres.     CHEST: There is physiologic hypermetabolism of the left ventricular  myocardium. There is evidence of considerable interval treatment  response. All of the previously noted enlarged and hypermetabolic lymph  nodes demonstrate decreased size and essentially resolved FDG  hypermetabolism, for example a bulky AP window lymph node previously  measured 3.9 x 2.9 cm, currently 13 x 17 mm and not hypermetabolic,  maximum SUV 1.8, previously 4.2. There are no new hypermetabolic  suspicious pulmonary nodules.     ABDOMEN AND PELVIS: There is physiologic activity of the  gastrointestinal and genitourinary tracts, without focal hypermetabolism  concerning for acute or neoplastic process. Osseous structures are  unremarkable diffusely.     Impression: PET/CT demonstrates evidence of significant interval treatment response.  The previously noted enlarged  mediastinal and supraclavicular lymph  nodes demonstrate resolution of PET hypermetabolism and significant  decrease in size. There are no distinct new enlarged or hypermetabolic  lymph nodes.      This report was finalized on 12/8/2022 2:39 PM by Yohan Loza.       I personally reviewed the imaging studies          ASSESSMENT AND PLAN:     William Maria is a 47 y.o. male with stage IIb Hodgkin lymphoma, here for follow-up.    Hodgkin lymphoma: He continues to do well with treatment.  After 2 cycles he has had resolution of hypermetabolic activity on PET/CT.  At this point we will proceed with cycle 3.  He meets with Dr. Wyman tomorrow to discuss the option of radiation treatment.  If he does decide to go ahead with radiation then we would plan for 2 more cycles of ABVD and consider repeat PFTs after 4 cycles.  If we are planning on chemotherapy alone then we will drop the bleomycin from his treatment.  He will contact us after he meets with Dr. Wyman with his final decision.    Follow-up in 4 weeks.          Total time of patient care on day of service including time prior to, face to face with patient, and following visit spent in reviewing records, lab results, imaging studies, discussion with patient, and documentation/charting was > 37 minutes.            Addendum: discussed with Dr. Wyman.  Location of LAD would result in increased dose to coronary arteries, so avoiding RT likely best option.  Will plan to proceed with 4 more cycles AVD, dropping bleomycin.        Tamie Mccullough MD  [facility] Hematology and Oncology    12/13/2022          CC:

## 2022-12-14 ENCOUNTER — HOSPITAL ENCOUNTER (OUTPATIENT)
Dept: RADIATION ONCOLOGY | Facility: HOSPITAL | Age: 47
Setting detail: RADIATION/ONCOLOGY SERIES
Discharge: HOME | End: 2022-12-14
Payer: COMMERCIAL

## 2022-12-14 ENCOUNTER — OFFICE VISIT (OUTPATIENT)
Dept: RADIATION ONCOLOGY | Facility: HOSPITAL | Age: 47
End: 2022-12-14

## 2022-12-14 VITALS
DIASTOLIC BLOOD PRESSURE: 76 MMHG | OXYGEN SATURATION: 99 % | RESPIRATION RATE: 16 BRPM | SYSTOLIC BLOOD PRESSURE: 105 MMHG | HEART RATE: 76 BPM | BODY MASS INDEX: 26.91 KG/M2 | WEIGHT: 209.6 LBS

## 2022-12-14 DIAGNOSIS — C81.02 NODULAR LYMPHOCYTE PREDOMINANT HODGKIN LYMPHOMA OF INTRATHORACIC LYMPH NODES: Primary | ICD-10-CM

## 2022-12-14 NOTE — PROGRESS NOTES
CONSULTATION NOTE    NAME:      William Maria  :                                                          1975  DATE OF CONSULTATION:                       22  REQUESTING PHYSICIAN:                   Tamie Mccullough MD  REASON FOR CONSULTATION:           IIB Hodgkin's disease         BRIEF HISTORY:  William Maria  is a very pleasant 47 y.o. male  who is a still very active and physically fit who was found to have Hodgkin's disease.  The patient's initial PET scan showed substantial moderate disease in the anterior mediastinum down to essentially the upper pericardium.  The patient also had a lesion in the supraclavicular fossa.  Initially, the patient underwent 2  cycles of ABVD with Dr. Mccullough and subsequently was found to have an excellent response on his PET scan.  The patient is seen today to discuss proceeding with radiation versus continuing with chemotherapy alone.    Patient ports he is doing very well.  He tries to be extremely active during the day and and has managed to gain some weight back.  The patient is tolerating chemotherapy quite well.      BMI:  Body mass index is 26.91 kg/m².      Social History     Substance and Sexual Activity   Alcohol Use Never       No Known Allergies    Social History     Tobacco Use   • Smoking status: Never   • Smokeless tobacco: Never   Vaping Use   • Vaping Use: Never used   Substance Use Topics   • Alcohol use: Never   • Drug use: Never         Past Medical History:   Diagnosis Date   • Anemia    • COVID-19 2022   • Hodgkin's lymphoma (HCC)        family history includes Colon cancer in his paternal great-grandfather; Diverticulitis in his maternal grandmother; No Known Problems in his father and mother.     Past Surgical History:   Procedure Laterality Date   • BONE MARROW BIOPSY  2022    Adin Molina   • COLONOSCOPY N/A 2022    Procedure: COLONOSCOPY with biopsy and polypectomy;  Surgeon: Lc Steinberg MD;   Location: [facility] ENDOSCOPY;  Service: Gastroenterology;  Laterality: N/A;   • ENDOSCOPY N/A 06/24/2022    Procedure: ESOPHAGOGASTRODUODENOSCOPY with biopsy;  Surgeon: Lc Steinberg MD;  Location: [facility] ENDOSCOPY;  Service: Gastroenterology;  Laterality: N/A;   • LYMPH NODE BIOPSY Left 9/30/2022    Procedure: BIOPSY SUPRACLAVICULAR LYMPH NODE;  Surgeon: Isaura Alonzo MD;  Location: [facility] OR;  Service: General;  Laterality: Left;   • PORTACATH PLACEMENT N/A 10/14/2022    Procedure: INSERTION OF PORTACATH;  Surgeon: Isaura Alonzo MD;  Location: [facility] OR;  Service: General;  Laterality: N/A;        Review of Systems - Oncology      A full 14 point review of systems was performed and was negative except as noted in the HPI.    Objective   VITAL SIGNS:   Vitals:    12/14/22 1001   BP: 105/76   Pulse: 76   Resp: 16   SpO2: 99%   Weight: 95.1 kg (209 lb 9.6 oz)   PainSc: 0-No pain        KPS       90%    Physical Exam  Constitutional:       General: He is not in acute distress.     Appearance: He is well-developed.      Comments: Very robust   HENT:      Head: Normocephalic and atraumatic.   Eyes:      Conjunctiva/sclera: Conjunctivae normal.      Pupils: Pupils are equal, round, and reactive to light.   Neck:      Trachea: No tracheal deviation.   Pulmonary:      Effort: Pulmonary effort is normal. No respiratory distress.      Breath sounds: No wheezing.   Abdominal:      General: There is no distension.      Palpations: Abdomen is soft.   Musculoskeletal:         General: Normal range of motion.      Cervical back: Normal range of motion.   Skin:     General: Skin is warm and dry.   Neurological:      General: No focal deficit present.      Mental Status: He is alert and oriented to person, place, and time.      Cranial Nerves: No cranial nerve deficit.      Coordination: Coordination normal.   Psychiatric:         Mood and Affect: Mood normal.         Behavior: Behavior normal.         Thought  Content: Thought content normal.         Judgment: Judgment normal.              The following portions of the patient's history were reviewed and updated as appropriate: allergies, current medications, past family history, past medical history, past social history, past surgical history and problem list.  I have personally requested reviewed and interpreted the patient's images and radiology reports and pathology reports listed below:  CT Chest With Contrast Diagnostic    Result Date: 9/8/2022  Extensive intrathoracic and bilateral supraclavicular lymphadenopathy. Findings are concerning for most likely lymphoma. Other differential considerations include metastasis, however no primary identified on this exam. The supraclavicular lymph nodes appears to be amenable for biopsy.  No intra-abdominal or pelvic lymphadenopathy identified. Mild splenomegaly, measuring up to 14 cm in craniocaudal dimension.  Indeterminate multiple hypodense lesions scattered throughout the liver, most likely benign findings such as cysts. Possible metastatic lesions cannot be excluded. Correlate clinically.  Consider further evaluation with PET/CT and referral to tumor board.    Images personally reviewed, interpreted and dictated by LORENZO Brizuela.        This report was signed and finalized on 9/8/2022 9:13 AM by LORENZO Brizuela.    CT Abdomen Pelvis With Contrast    Result Date: 9/8/2022  Extensive intrathoracic and bilateral supraclavicular lymphadenopathy. Findings are concerning for most likely lymphoma. Other differential considerations include metastasis, however no primary identified on this exam. The supraclavicular lymph nodes appears to be amenable for biopsy.  No intra-abdominal or pelvic lymphadenopathy identified. Mild splenomegaly, measuring up to 14 cm in craniocaudal dimension.  Indeterminate multiple hypodense lesions scattered throughout the liver, most likely benign findings such as cysts. Possible  metastatic lesions cannot be excluded. Correlate clinically.  Consider further evaluation with PET/CT and referral to tumor board.    Images personally reviewed, interpreted and dictated by LORENZO Brizuela.        This report was signed and finalized on 2022 9:13 AM by LORENZO Brizuela.    XR Chest 1 View    Result Date: 10/14/2022  1. Left chest port with tip in good position without pneumothorax. 2. Widened mediastinum corresponding to known adenopathy.  This report was signed and finalized on 10/14/2022 3:23 PM by Scott Chau MD.    NM PET/CT Skull Base to Mid Thigh    Result Date: 2022  PET/CT demonstrates evidence of significant interval treatment response. The previously noted enlarged mediastinal and supraclavicular lymph nodes demonstrate resolution of PET hypermetabolism and significant decrease in size. There are no distinct new enlarged or hypermetabolic lymph nodes.  This report was finalized on 2022 2:39 PM by Yohan Loza.      NM PET/CT Skull Base to Mid Thigh    Result Date: 10/10/2022  There is extensive bulky enlarged and hypermetabolic mediastinal lymphadenopathy, fairly symmetric and consistent with provided history of lymphoma. Contiguous lymphadenopathy of the upper mediastinum extends to the lower cervical regions along the midline as above. There is no additional remote pathologic hypermetabolic adenopathy or additional evidence of lymphomatous involvement outside of the mediastinum.  This report was finalized on 10/10/2022 5:37 PM by Yohan Loza.      Component 2 mo ago    Reference Lab Report [facility]   [address] [place] [address]   Phone: [phone] or [phone]   Fax: [phone]   Francis Moreno M.D., Medical Director     PATIENT NAME                           LABORATORY NO.   MELLY EDUARDO                        [id number]   [id number]                         AGE              SEX  SSN            CLIENT REF #   [facility]            46      1975  YADIRA    [id number]   [id number]     [address]                REQUESTING M.D.     ATTENDING M.D.     COPY TO.   [address]                        NITHIN, MARGA ROLLINS, TIM MCCULLOUGH, [place] [address]                 DATE COLLECTED      DATE RECEIVED      DATE REPORTED   09/30/2022          10/03/2022         10/05/2022     DIAGNOSIS:   LEFT SUPRACLAVICULAR LYMPH NODE, EXCISION WITH FLOW   CYTOMETRY:   Nodular sclerosis classic Hodgkin lymphoma.       COMMENT:  Results were discussed with Dr. Tamie Mccullough.     CLINICAL HISTORY:   Rule out lymphoma, lymphadenopathy, supraclavicular     SPECIMENS RECEIVED:   LEFT SUPRACLAVICULAR LYMPH NODE     MICROSCOPIC DESCRIPTION:   Histologic sections demonstrate majority effacement of the lymph node   architecture by nodules of mixed atypical lymphoid cells containing many   Hodgkin and Jerad-Kim cells in the background of wispy fibrosis which   form occasional complete fibrous bands. The Hodgkin and Jerad-Kim cells   reside in a background of small mature lymphocytes with patchy eosinophils and   plasma cells. Some sections demonstrate a rim of residual lymph node cortex   with secondary follicles.     In order to confirm the diagnosis, six immunohistochemical stains and one in   situ hybridization are performed. These are also analyzed on flow cytometry but   are necessary to perform a tissue as Hodgkin and Jerad-Kim cells are not   evaluated by flow cytometry. The atypical lymphoid cells are positive for CD30   (membranous, strong, diffuse), PAX5 (weak), and CD15 (strong, membranous,   diffuse) and are negative for CD3, CD20, and ALK1. CD3 highlights many   background small T cells, including those immediately around the Hodgkin and   Jerad-Kim cells. CD20 shows some expanded B-cell follicles in the middle   of the tumor, and also normal B-cell follicles at the  "edge of the lymph node. In   situ hybridization for EBV encoded RNA (KENZIE) highlights occasional   positivity on scattered background lymphocytes, but appears negative on     Professional interpretation rendered by Jose Taylor M.D., HAI. at [facility], [employer], [address] [place] [address].     FLOW CYTOMETRY:   Interpretation: No immunophenotypic evidence of non-Hodgkin lymphoma.   Increased T-cell CD4 to CD8 ratio     Flow cytometric analysis shows a prominent lymphoid population. T-cells (46%   of lymphocytes) show preserved expression of pan-T-cell antigens and have a   CD4 to CD8 ratio of 9.5:1. The cells demonstrate majority CD38 expression. B-   cells (53%) are polyclonal. Few NK-cells are present (1%).     The following antibodies were tested: CD2, CD3, CD4, CD5, CD7, CD8, CD10,   CD11b, CD19, CD20, CD23, CD30, CD38, CD43, CD45, CD56, CD57, FMC-   7, HLA-DR, kappa, lambda. Technical component performed at [facility] in [place], [place]. The interpretation is performed by Dr. Jose Taylor at [facility] (see above for address). These tests use   analyte specific reagents. The performance of these analyte specific reagents was   determined by [facility]. These tests have not been cleared or   approved by the U.S. Food & Drug Administration (FDA). The FDA has   determined that such approval is not necessary. These tests are used for clinical   purposes and should not be considered experimental or research.     GROSS DESCRIPTION:   Specimen received in formalin labeled \"left supraclavicular lymph node\" and   consists of a 2.5 x 2.2 x 2.0 cm lymph node.  No areas of hemorrhage or necrosis   are grossly identified.  Representative section are submitted in 5 cassettes. JARCADIO     REVIEWED, DIAGNOSED AND ELECTRONICALLY   SIGNED BY:     Jose Taylor M.D., DIVYA.MEME.A.P.   CPT CODES:  33380, 42255, 88341x5, 71936, 85795          I discussed the case with Dr. Mccullough " personally.    Assessment      IMPRESSION:     Mr. Maria is a very pleasant 47-year-old gentleman with stage IIb Hodgkin's disease.  The patient does have disease that is of very low down essentially into the pericardium.  The patient is tolerating treatment well has had a good initial response to chemotherapy.  We discussed options for the patient today.  We discussed his extent of disease and some of the limitations associated with radiotherapy and compared contrasted those with the chemotherapy only approach.  We discussed that his outcomes may be a few percentages better with the addition of radiation to his regimen; however, we discussed the data and the relative minimal difference in outcomes in his case.  We discussed the side effects of chemotherapy and radiation and compared to contrasted them.  We discussed that the acute and medium term side effects radiation would likely be very tolerable and compared very favorably to chemotherapy.  We discussed that for him the main problem would be long-term cardiac toxicity as the patient's disease extends very close to to the upper border of his heart.  Due to this the patient would likely receive fairly high doses to the proximal LAD and right main arteries.  We discussed the potential effects of this in the long-term.      Overall the patient was not interested in pursuing radiation and was more interested in pursuing a chemotherapy only approach.  We discussed that this is a fairly reasonable option for him given his particular extent of disease.    I discussed this with Dr. Mccullough she is interested in proceeding with chemotherapy alone for him.    We did discuss that the patient is tolerability of chemotherapy changes were something happens then we could reconsider radiation at any point in future should he need it.    Greater than 1 hour was spent preparing for and coordinating this visit. >50% of the time was spent in direct face to face conversation with  the patient teaching, answering question, and providing explanations regarding the patient's case.  The decision to treat the patient with radiation is a complex one and carries the risk of long-term side effects and complications.  The patient's malignancy represents a complicated life threatening condition that requires complex multidisciplinary management for treatment and followup.      RECOMMENDATIONS:    Stage IIb Hodgkin's disease  -Low-lying disease essentially the pericardium at presentation  -Initial good response to 2 cycles of ABVD  -Patient very concerned about possibility of cardiac toxicity in the long run with radiation  -Recommend chemotherapy only approach this patient's case  -We will continue to follow with Dr. Sadia Wyman MD        Errors in dictation may reflect use of voice recognition software and not all errors in transcription may have been detected prior to signing.

## 2022-12-27 ENCOUNTER — INFUSION (OUTPATIENT)
Dept: ONCOLOGY | Facility: HOSPITAL | Age: 47
End: 2022-12-27

## 2022-12-27 VITALS
WEIGHT: 209.2 LBS | BODY MASS INDEX: 26.86 KG/M2 | TEMPERATURE: 97.8 F | SYSTOLIC BLOOD PRESSURE: 140 MMHG | HEART RATE: 76 BPM | RESPIRATION RATE: 16 BRPM | DIASTOLIC BLOOD PRESSURE: 80 MMHG

## 2022-12-27 DIAGNOSIS — C81.92 HODGKIN LYMPHOMA OF INTRATHORACIC LYMPH NODES, UNSPECIFIED HODGKIN LYMPHOMA TYPE: ICD-10-CM

## 2022-12-27 DIAGNOSIS — C81.02 NODULAR LYMPHOCYTE PREDOMINANT HODGKIN LYMPHOMA OF INTRATHORACIC LYMPH NODES: Primary | ICD-10-CM

## 2022-12-27 LAB
ALBUMIN SERPL-MCNC: 4.3 G/DL (ref 3.5–5.2)
ALBUMIN/GLOB SERPL: 1.5 G/DL
ALP SERPL-CCNC: 80 U/L (ref 39–117)
ALT SERPL W P-5'-P-CCNC: 18 U/L (ref 1–41)
ANION GAP SERPL CALCULATED.3IONS-SCNC: 9.5 MMOL/L (ref 5–15)
ANISOCYTOSIS BLD QL: ABNORMAL
AST SERPL-CCNC: 20 U/L (ref 1–40)
BASOPHILS # BLD MANUAL: 0.03 10*3/MM3 (ref 0–0.2)
BASOPHILS NFR BLD MANUAL: 1 % (ref 0–1.5)
BILIRUB SERPL-MCNC: <0.2 MG/DL (ref 0–1.2)
BUN SERPL-MCNC: 17 MG/DL (ref 6–20)
BUN/CREAT SERPL: 21.3 (ref 7–25)
CALCIUM SPEC-SCNC: 9.7 MG/DL (ref 8.6–10.5)
CHLORIDE SERPL-SCNC: 104 MMOL/L (ref 98–107)
CO2 SERPL-SCNC: 28.5 MMOL/L (ref 22–29)
CREAT SERPL-MCNC: 0.8 MG/DL (ref 0.76–1.27)
DEPRECATED RDW RBC AUTO: 61.8 FL (ref 37–54)
EGFRCR SERPLBLD CKD-EPI 2021: 111.2 ML/MIN/1.73
EOSINOPHIL # BLD MANUAL: 0.21 10*3/MM3 (ref 0–0.4)
EOSINOPHIL NFR BLD MANUAL: 7 % (ref 0.3–6.2)
ERYTHROCYTE [DISTWIDTH] IN BLOOD BY AUTOMATED COUNT: 21.9 % (ref 12.3–15.4)
GLOBULIN UR ELPH-MCNC: 2.9 GM/DL
GLUCOSE SERPL-MCNC: 97 MG/DL (ref 65–99)
HCT VFR BLD AUTO: 41 % (ref 37.5–51)
HGB BLD-MCNC: 13.2 G/DL (ref 13–17.7)
LYMPHOCYTES # BLD MANUAL: 0.53 10*3/MM3 (ref 0.7–3.1)
LYMPHOCYTES NFR BLD MANUAL: 22 % (ref 5–12)
MCH RBC QN AUTO: 25.6 PG (ref 26.6–33)
MCHC RBC AUTO-ENTMCNC: 32.2 G/DL (ref 31.5–35.7)
MCV RBC AUTO: 79.5 FL (ref 79–97)
MONOCYTES # BLD: 0.65 10*3/MM3 (ref 0.1–0.9)
NEUTROPHILS # BLD AUTO: 1.54 10*3/MM3 (ref 1.7–7)
NEUTROPHILS NFR BLD MANUAL: 52 % (ref 42.7–76)
NRBC SPEC MANUAL: 1 /100 WBC (ref 0–0.2)
PLATELET # BLD AUTO: 226 10*3/MM3 (ref 140–450)
PMV BLD AUTO: 9.7 FL (ref 6–12)
POTASSIUM SERPL-SCNC: 4.4 MMOL/L (ref 3.5–5.2)
PROT SERPL-MCNC: 7.2 G/DL (ref 6–8.5)
RBC # BLD AUTO: 5.16 10*6/MM3 (ref 4.14–5.8)
SCAN SLIDE: NORMAL
SMALL PLATELETS BLD QL SMEAR: ADEQUATE
SODIUM SERPL-SCNC: 142 MMOL/L (ref 136–145)
VARIANT LYMPHS NFR BLD MANUAL: 18 % (ref 19.6–45.3)
WBC MORPH BLD: NORMAL
WBC NRBC COR # BLD: 2.96 10*3/MM3 (ref 3.4–10.8)

## 2022-12-27 PROCEDURE — 25010000002 PALONOSETRON 0.25 MG/5ML SOLUTION PREFILLED SYRINGE: Performed by: INTERNAL MEDICINE

## 2022-12-27 PROCEDURE — 85025 COMPLETE CBC W/AUTO DIFF WBC: CPT

## 2022-12-27 PROCEDURE — 96413 CHEMO IV INFUSION 1 HR: CPT

## 2022-12-27 PROCEDURE — 25010000002 DEXAMETHASONE SODIUM PHOSPHATE 20 MG/5ML SOLUTION: Performed by: INTERNAL MEDICINE

## 2022-12-27 PROCEDURE — 96417 CHEMO IV INFUS EACH ADDL SEQ: CPT

## 2022-12-27 PROCEDURE — 96375 TX/PRO/DX INJ NEW DRUG ADDON: CPT

## 2022-12-27 PROCEDURE — 85007 BL SMEAR W/DIFF WBC COUNT: CPT

## 2022-12-27 PROCEDURE — 25010000002 HEPARIN LOCK FLUSH PER 10 UNITS: Performed by: INTERNAL MEDICINE

## 2022-12-27 PROCEDURE — 25010000002 DOXORUBICIN PER 10 MG: Performed by: INTERNAL MEDICINE

## 2022-12-27 PROCEDURE — 96368 THER/DIAG CONCURRENT INF: CPT

## 2022-12-27 PROCEDURE — 96411 CHEMO IV PUSH ADDL DRUG: CPT

## 2022-12-27 PROCEDURE — 80053 COMPREHEN METABOLIC PANEL: CPT

## 2022-12-27 PROCEDURE — 25010000002 DACARBAZINE PER 200 MG: Performed by: INTERNAL MEDICINE

## 2022-12-27 PROCEDURE — 25010000002 VINBLASTINE PER 1 MG: Performed by: INTERNAL MEDICINE

## 2022-12-27 PROCEDURE — 25010000002 FOSAPREPITANT PER 1 MG: Performed by: INTERNAL MEDICINE

## 2022-12-27 RX ORDER — PALONOSETRON 0.05 MG/ML
0.25 INJECTION, SOLUTION INTRAVENOUS ONCE
Status: COMPLETED | OUTPATIENT
Start: 2022-12-27 | End: 2022-12-27

## 2022-12-27 RX ORDER — DOXORUBICIN HYDROCHLORIDE 2 MG/ML
25 INJECTION, SOLUTION INTRAVENOUS ONCE
Status: COMPLETED | OUTPATIENT
Start: 2022-12-27 | End: 2022-12-27

## 2022-12-27 RX ORDER — SODIUM CHLORIDE 9 MG/ML
250 INJECTION, SOLUTION INTRAVENOUS ONCE
Status: DISCONTINUED | OUTPATIENT
Start: 2022-12-27 | End: 2022-12-27 | Stop reason: HOSPADM

## 2022-12-27 RX ORDER — HEPARIN SODIUM (PORCINE) LOCK FLUSH IV SOLN 100 UNIT/ML 100 UNIT/ML
500 SOLUTION INTRAVENOUS AS NEEDED
Status: CANCELLED | OUTPATIENT
Start: 2022-12-27

## 2022-12-27 RX ORDER — OLANZAPINE 5 MG/1
5 TABLET ORAL ONCE
Status: DISCONTINUED | OUTPATIENT
Start: 2022-12-27 | End: 2022-12-27 | Stop reason: HOSPADM

## 2022-12-27 RX ORDER — HEPARIN SODIUM (PORCINE) LOCK FLUSH IV SOLN 100 UNIT/ML 100 UNIT/ML
500 SOLUTION INTRAVENOUS AS NEEDED
Status: DISCONTINUED | OUTPATIENT
Start: 2022-12-27 | End: 2022-12-27 | Stop reason: HOSPADM

## 2022-12-27 RX ADMIN — DACARBAZINE 820 MG: 10 INJECTION, POWDER, FOR SOLUTION INTRAVENOUS at 12:43

## 2022-12-27 RX ADMIN — DEXAMETHASONE SODIUM PHOSPHATE 12 MG: 4 INJECTION, SOLUTION INTRAMUSCULAR; INTRAVENOUS at 11:19

## 2022-12-27 RX ADMIN — DOXORUBICIN HYDROCHLORIDE 54 MG: 2 INJECTION, SOLUTION INTRAVENOUS at 11:45

## 2022-12-27 RX ADMIN — PALONOSETRON 0.25 MG: 0.25 INJECTION, SOLUTION INTRAVENOUS at 11:16

## 2022-12-27 RX ADMIN — FOSAPREPITANT 150 MG: 150 INJECTION, POWDER, LYOPHILIZED, FOR SOLUTION INTRAVENOUS at 11:19

## 2022-12-27 RX ADMIN — VINBLASTINE SULFATE 13 MG: 1 INJECTION INTRAVENOUS at 12:21

## 2022-12-27 RX ADMIN — HEPARIN 500 UNITS: 100 SYRINGE at 13:17

## 2022-12-28 ENCOUNTER — TELEPHONE (OUTPATIENT)
Dept: NUTRITION | Facility: HOSPITAL | Age: 47
End: 2022-12-28

## 2022-12-28 NOTE — PROGRESS NOTES
Spoke w/ pt over the phone for nutrition follow-up regarding oncology treatment. Pt reports he is eating well and staying hydrated - drinking 120 fl oz per day. Pt reports he has been eating a lot of whole foods and avoiding processed sugar. He denies wt loss and has gained 12# since starting chemotherapy. Pt denies any questions/concerns at this time. RD available PRN.

## 2023-01-05 ENCOUNTER — TELEPHONE (OUTPATIENT)
Dept: ONCOLOGY | Facility: CLINIC | Age: 48
End: 2023-01-05
Payer: COMMERCIAL

## 2023-01-05 NOTE — TELEPHONE ENCOUNTER
Caller: JOSÉ    Relationship to patient: [facility]    Best call back number: [phone]    FAX # [phone]    JOSÉ IS NEEDING CT REPORT AND PROGRESS REPORT FORM 12-13-22.

## 2023-01-06 NOTE — TELEPHONE ENCOUNTER
Verbal approval from patient to always send records to [facility] when requested.  Records sent with confirmation of receipt.  FAX # [phone]

## 2023-01-10 ENCOUNTER — OFFICE VISIT (OUTPATIENT)
Dept: ONCOLOGY | Facility: CLINIC | Age: 48
End: 2023-01-10
Payer: COMMERCIAL

## 2023-01-10 ENCOUNTER — INFUSION (OUTPATIENT)
Dept: ONCOLOGY | Facility: HOSPITAL | Age: 48
End: 2023-01-10
Payer: COMMERCIAL

## 2023-01-10 VITALS
RESPIRATION RATE: 12 BRPM | OXYGEN SATURATION: 99 % | SYSTOLIC BLOOD PRESSURE: 131 MMHG | HEIGHT: 74 IN | HEART RATE: 76 BPM | DIASTOLIC BLOOD PRESSURE: 78 MMHG | BODY MASS INDEX: 27.21 KG/M2 | WEIGHT: 212 LBS | TEMPERATURE: 98 F

## 2023-01-10 DIAGNOSIS — C81.02 NODULAR LYMPHOCYTE PREDOMINANT HODGKIN LYMPHOMA OF INTRATHORACIC LYMPH NODES: Primary | ICD-10-CM

## 2023-01-10 DIAGNOSIS — C81.92 HODGKIN LYMPHOMA OF INTRATHORACIC LYMPH NODES, UNSPECIFIED HODGKIN LYMPHOMA TYPE: ICD-10-CM

## 2023-01-10 LAB
ALBUMIN SERPL-MCNC: 4.2 G/DL (ref 3.5–5.2)
ALBUMIN/GLOB SERPL: 1.4 G/DL
ALP SERPL-CCNC: 77 U/L (ref 39–117)
ALT SERPL W P-5'-P-CCNC: 17 U/L (ref 1–41)
ANION GAP SERPL CALCULATED.3IONS-SCNC: 10.3 MMOL/L (ref 5–15)
ANISOCYTOSIS BLD QL: ABNORMAL
AST SERPL-CCNC: 22 U/L (ref 1–40)
BILIRUB SERPL-MCNC: 0.2 MG/DL (ref 0–1.2)
BUN SERPL-MCNC: 17 MG/DL (ref 6–20)
BUN/CREAT SERPL: 20.5 (ref 7–25)
CALCIUM SPEC-SCNC: 9.6 MG/DL (ref 8.6–10.5)
CHLORIDE SERPL-SCNC: 101 MMOL/L (ref 98–107)
CO2 SERPL-SCNC: 26.7 MMOL/L (ref 22–29)
CREAT SERPL-MCNC: 0.83 MG/DL (ref 0.76–1.27)
DEPRECATED RDW RBC AUTO: 59.4 FL (ref 37–54)
EGFRCR SERPLBLD CKD-EPI 2021: 110 ML/MIN/1.73
ELLIPTOCYTES BLD QL SMEAR: ABNORMAL
EOSINOPHIL # BLD MANUAL: 0.26 10*3/MM3 (ref 0–0.4)
EOSINOPHIL NFR BLD MANUAL: 9 % (ref 0.3–6.2)
ERYTHROCYTE [DISTWIDTH] IN BLOOD BY AUTOMATED COUNT: 20.1 % (ref 12.3–15.4)
GLOBULIN UR ELPH-MCNC: 2.9 GM/DL
GLUCOSE SERPL-MCNC: 101 MG/DL (ref 65–99)
HCT VFR BLD AUTO: 39.2 % (ref 37.5–51)
HGB BLD-MCNC: 12.7 G/DL (ref 13–17.7)
LYMPHOCYTES # BLD MANUAL: 0.54 10*3/MM3 (ref 0.7–3.1)
LYMPHOCYTES NFR BLD MANUAL: 31 % (ref 5–12)
MCH RBC QN AUTO: 26.3 PG (ref 26.6–33)
MCHC RBC AUTO-ENTMCNC: 32.4 G/DL (ref 31.5–35.7)
MCV RBC AUTO: 81.2 FL (ref 79–97)
MONOCYTES # BLD: 0.88 10*3/MM3 (ref 0.1–0.9)
NEUTROPHILS # BLD AUTO: 1.17 10*3/MM3 (ref 1.7–7)
NEUTROPHILS NFR BLD MANUAL: 34 % (ref 42.7–76)
NEUTS BAND NFR BLD MANUAL: 7 % (ref 0–5)
PLATELET # BLD AUTO: 244 10*3/MM3 (ref 140–450)
PMV BLD AUTO: 9.9 FL (ref 6–12)
POTASSIUM SERPL-SCNC: 4.2 MMOL/L (ref 3.5–5.2)
PROT SERPL-MCNC: 7.1 G/DL (ref 6–8.5)
RBC # BLD AUTO: 4.83 10*6/MM3 (ref 4.14–5.8)
SCAN SLIDE: NORMAL
SMALL PLATELETS BLD QL SMEAR: ADEQUATE
SODIUM SERPL-SCNC: 138 MMOL/L (ref 136–145)
VARIANT LYMPHS NFR BLD MANUAL: 1 % (ref 0–5)
VARIANT LYMPHS NFR BLD MANUAL: 18 % (ref 19.6–45.3)
WBC MORPH BLD: NORMAL
WBC NRBC COR # BLD: 2.85 10*3/MM3 (ref 3.4–10.8)

## 2023-01-10 PROCEDURE — 80053 COMPREHEN METABOLIC PANEL: CPT

## 2023-01-10 PROCEDURE — 96375 TX/PRO/DX INJ NEW DRUG ADDON: CPT

## 2023-01-10 PROCEDURE — 25010000002 DACARBAZINE PER 200 MG: Performed by: NURSE PRACTITIONER

## 2023-01-10 PROCEDURE — 85025 COMPLETE CBC W/AUTO DIFF WBC: CPT

## 2023-01-10 PROCEDURE — 25010000002 FOSAPREPITANT PER 1 MG: Performed by: NURSE PRACTITIONER

## 2023-01-10 PROCEDURE — 25010000002 DEXAMETHASONE SODIUM PHOSPHATE 20 MG/5ML SOLUTION: Performed by: NURSE PRACTITIONER

## 2023-01-10 PROCEDURE — 85007 BL SMEAR W/DIFF WBC COUNT: CPT

## 2023-01-10 PROCEDURE — 25010000002 HEPARIN LOCK FLUSH PER 10 UNITS: Performed by: INTERNAL MEDICINE

## 2023-01-10 PROCEDURE — 96413 CHEMO IV INFUSION 1 HR: CPT

## 2023-01-10 PROCEDURE — 25010000002 PALONOSETRON 0.25 MG/5ML SOLUTION PREFILLED SYRINGE: Performed by: NURSE PRACTITIONER

## 2023-01-10 PROCEDURE — 99214 OFFICE O/P EST MOD 30 MIN: CPT | Performed by: NURSE PRACTITIONER

## 2023-01-10 PROCEDURE — 96367 TX/PROPH/DG ADDL SEQ IV INF: CPT

## 2023-01-10 PROCEDURE — 25010000002 DOXORUBICIN PER 10 MG: Performed by: NURSE PRACTITIONER

## 2023-01-10 PROCEDURE — 96366 THER/PROPH/DIAG IV INF ADDON: CPT

## 2023-01-10 PROCEDURE — 25010000002 VINBLASTINE PER 1 MG: Performed by: NURSE PRACTITIONER

## 2023-01-10 PROCEDURE — 96411 CHEMO IV PUSH ADDL DRUG: CPT

## 2023-01-10 RX ORDER — OLANZAPINE 5 MG/1
5 TABLET ORAL ONCE
Status: CANCELLED | OUTPATIENT
Start: 2023-01-10 | End: 2023-01-10

## 2023-01-10 RX ORDER — PALONOSETRON 0.05 MG/ML
0.25 INJECTION, SOLUTION INTRAVENOUS ONCE
Status: CANCELLED | OUTPATIENT
Start: 2023-01-24

## 2023-01-10 RX ORDER — DOXORUBICIN HYDROCHLORIDE 2 MG/ML
25 INJECTION, SOLUTION INTRAVENOUS ONCE
Status: CANCELLED | OUTPATIENT
Start: 2023-01-24

## 2023-01-10 RX ORDER — SODIUM CHLORIDE 9 MG/ML
250 INJECTION, SOLUTION INTRAVENOUS ONCE
Status: DISCONTINUED | OUTPATIENT
Start: 2023-01-10 | End: 2023-01-10 | Stop reason: HOSPADM

## 2023-01-10 RX ORDER — DOXORUBICIN HYDROCHLORIDE 2 MG/ML
25 INJECTION, SOLUTION INTRAVENOUS ONCE
Status: CANCELLED | OUTPATIENT
Start: 2023-01-10

## 2023-01-10 RX ORDER — SODIUM CHLORIDE 9 MG/ML
250 INJECTION, SOLUTION INTRAVENOUS ONCE
Status: CANCELLED | OUTPATIENT
Start: 2023-01-10

## 2023-01-10 RX ORDER — SODIUM CHLORIDE 9 MG/ML
250 INJECTION, SOLUTION INTRAVENOUS ONCE
Status: CANCELLED | OUTPATIENT
Start: 2023-01-24

## 2023-01-10 RX ORDER — OLANZAPINE 5 MG/1
5 TABLET ORAL ONCE
Status: CANCELLED | OUTPATIENT
Start: 2023-01-24 | End: 2023-01-24

## 2023-01-10 RX ORDER — DOXORUBICIN HYDROCHLORIDE 2 MG/ML
25 INJECTION, SOLUTION INTRAVENOUS ONCE
Status: COMPLETED | OUTPATIENT
Start: 2023-01-10 | End: 2023-01-10

## 2023-01-10 RX ORDER — HEPARIN SODIUM (PORCINE) LOCK FLUSH IV SOLN 100 UNIT/ML 100 UNIT/ML
500 SOLUTION INTRAVENOUS AS NEEDED
Status: DISCONTINUED | OUTPATIENT
Start: 2023-01-10 | End: 2023-01-10 | Stop reason: HOSPADM

## 2023-01-10 RX ORDER — OLANZAPINE 5 MG/1
5 TABLET ORAL ONCE
Status: DISCONTINUED | OUTPATIENT
Start: 2023-01-10 | End: 2023-01-10 | Stop reason: HOSPADM

## 2023-01-10 RX ORDER — PALONOSETRON 0.05 MG/ML
0.25 INJECTION, SOLUTION INTRAVENOUS ONCE
Status: CANCELLED | OUTPATIENT
Start: 2023-01-10

## 2023-01-10 RX ORDER — PALONOSETRON 0.05 MG/ML
0.25 INJECTION, SOLUTION INTRAVENOUS ONCE
Status: COMPLETED | OUTPATIENT
Start: 2023-01-10 | End: 2023-01-10

## 2023-01-10 RX ORDER — HEPARIN SODIUM (PORCINE) LOCK FLUSH IV SOLN 100 UNIT/ML 100 UNIT/ML
500 SOLUTION INTRAVENOUS AS NEEDED
Status: CANCELLED | OUTPATIENT
Start: 2023-01-10

## 2023-01-10 RX ADMIN — DEXAMETHASONE SODIUM PHOSPHATE 12 MG: 4 INJECTION, SOLUTION INTRAMUSCULAR; INTRAVENOUS at 12:54

## 2023-01-10 RX ADMIN — DACARBAZINE 820 MG: 10 INJECTION, POWDER, FOR SOLUTION INTRAVENOUS at 14:02

## 2023-01-10 RX ADMIN — PALONOSETRON 0.25 MG: 0.25 INJECTION, SOLUTION INTRAVENOUS at 13:19

## 2023-01-10 RX ADMIN — HEPARIN 500 UNITS: 100 SYRINGE at 14:37

## 2023-01-10 RX ADMIN — VINBLASTINE SULFATE 13 MG: 1 INJECTION INTRAVENOUS at 13:42

## 2023-01-10 RX ADMIN — DOXORUBICIN HYDROCHLORIDE 54 MG: 2 INJECTION, SOLUTION INTRAVENOUS at 13:21

## 2023-01-10 RX ADMIN — SODIUM CHLORIDE 150 MG: 9 INJECTION, SOLUTION INTRAVENOUS at 12:16

## 2023-01-10 NOTE — PROGRESS NOTES
PROBLEM LIST:  1.  Hodgkin lymphoma, stage IIB  A) bone marrow biopsy done for chronic anemia 8/12/2022 showed a normocellular bone marrow with maturing trilineage hematopoiesis, increased stainable marrow iron, no evidence of dysplasia, fibrosis, or increased blasts.  Labs showed no evidence of nutrient deficiency, or heavy metal toxicity.  B) CT chest abdomen pelvis on 9/7/2022 showed extensive intrathoracic and supraclavicular lymphadenopathy, largest superior mediastinal lymph node 3.8 cm, left supraclavicular lymph node 2.8 cm, right supraclavicular lymph node 2.7 cm.  Hypodense lesions in the liver most likely benign cysts.  C) supraclavicular lymph node biopsy on 9/30/2022 showed nodular sclerosing Hodgkin lymphoma.  PET/CT on 10/10/2022 showed enlarged and hypermetabolic mediastinal lymphadenopathy, contiguous to lower cervical regions.  No other sites of disease.  D) treatment with ABVD started 10/18/2022.  2. Weight loss    Subjective     CHIEF COMPLAINT: anemia    HISTORY OF PRESENT ILLNESS:   William Maria returns for follow-up. Overall, he is doing well. He continues to walk/run at least 2-4 miles a day.  No new concerns or complaints.  He is open for PFT to be done back in [place] and possibly on same day as echo.       Objective      /78   Pulse 76   Temp 98 °F (36.7 °C) (Temporal)   Resp 12   Ht 188 cm (74\")   Wt 96.2 kg (212 lb)   SpO2 99%   BMI 27.22 kg/m²      Performance Status:0              General: well appearing male in no acute distress  Neuro: alert and oriented  HEENT: sclera anicteric, oropharynx clear  Lymphatics: no cervical, supraclavicular, or axillary adenopathy  Cardiovascular: regular rate and rhythm, no murmurs  Lungs: clear to auscultation bilaterally  Extremeties: no lower extremity edema  Skin: no rashes, lesions, bruising, or petechiae  Psych: mood and affect appropriate          RECENT LABS:    Lab Results   Component Value Date    WBC 2.96 (L)  12/27/2022    HGB 13.2 12/27/2022    HCT 41.0 12/27/2022    MCV 79.5 12/27/2022     12/27/2022     Lab Results   Component Value Date    GLUCOSE 97 12/27/2022    BUN 17 12/27/2022    CREATININE 0.80 12/27/2022    BCR 21.3 12/27/2022    K 4.4 12/27/2022    CO2 28.5 12/27/2022    CALCIUM 9.7 12/27/2022    PROTENTOTREF 7.1 07/12/2022    ALBUMIN 4.3 12/27/2022    LABIL2 0.8 07/12/2022    AST 20 12/27/2022    ALT 18 12/27/2022       NM PET/CT Skull Base to Mid Thigh  Narrative: DATE OF EXAM: 12/8/2022 9:02 AM     PROCEDURE: NM PET/CT SKULL BASE TO MID THIGH-     INDICATIONS: hodgkin lymphoma; C81.12-Nodular sclerosis Hodgkin  lymphoma, intrathoracic lymph nodes     TECHNIQUE: 13.42 mCi of F-18 labeled FDG was administered intravenously  followed by PET imaging from the skull vertex through the mid thighs.  Low-dose non contrast CT imaging of the same body region was performed.  Fused PET-CT images and 3D MIP PET images were utilized for  interpretation. Blood glucose level at the time of injection was 103  mg/dl.     COMPARISON: 10/10/2022     HEAD AND NECK: Physiologic hypermetabolism of the aerodigestive tract  structures is present, without hypermetabolic cervical adenopathy or  aerodigestive tract mass. Normal hypermetabolism of the cerebral  hemispheres.     CHEST: There is physiologic hypermetabolism of the left ventricular  myocardium. There is evidence of considerable interval treatment  response. All of the previously noted enlarged and hypermetabolic lymph  nodes demonstrate decreased size and essentially resolved FDG  hypermetabolism, for example a bulky AP window lymph node previously  measured 3.9 x 2.9 cm, currently 13 x 17 mm and not hypermetabolic,  maximum SUV 1.8, previously 4.2. There are no new hypermetabolic  suspicious pulmonary nodules.     ABDOMEN AND PELVIS: There is physiologic activity of the  gastrointestinal and genitourinary tracts, without focal hypermetabolism  concerning for acute  or neoplastic process. Osseous structures are  unremarkable diffusely.     Impression: PET/CT demonstrates evidence of significant interval treatment response.  The previously noted enlarged mediastinal and supraclavicular lymph  nodes demonstrate resolution of PET hypermetabolism and significant  decrease in size. There are no distinct new enlarged or hypermetabolic  lymph nodes.      This report was finalized on 12/8/2022 2:39 PM by Yohan Loza.       I personally reviewed the imaging studies          ASSESSMENT AND PLAN:     William Maria is a 47 y.o. male with stage IIb Hodgkin lymphoma, here for follow-up.    Hodgkin lymphoma: Overall, he is tolerating treatment well. Clinically, doing well. We will proceed with cycle #4 without bleomycin.  We will check PFT and echo prior to return. He is requesting during his off week so we will attempt to schedule at that time. We will plan on doing those in [place].      Case was discussed between Dr. Wyman and Dr. Mccullough.  Location of LAD would result in increased dose to coronary arteries, so we will avoid RT as this is likely the best option.      Follow-up in 4 weeks.        Total time of patient care including time prior to, face to face with patient, and following visit spent in reviewing records, lab results, imaging studies, discussion with patient, and documentation/charting was > 30 minutes                  Ashanti Spicer, ASHLYN  [facility]    1/10/2023          CC:

## 2023-01-11 LAB — REF LAB TEST METHOD: NORMAL

## 2023-01-24 ENCOUNTER — INFUSION (OUTPATIENT)
Dept: ONCOLOGY | Facility: HOSPITAL | Age: 48
End: 2023-01-24
Payer: COMMERCIAL

## 2023-01-24 VITALS
WEIGHT: 213.4 LBS | HEART RATE: 71 BPM | OXYGEN SATURATION: 100 % | DIASTOLIC BLOOD PRESSURE: 68 MMHG | SYSTOLIC BLOOD PRESSURE: 113 MMHG | BODY MASS INDEX: 27.4 KG/M2 | RESPIRATION RATE: 12 BRPM | TEMPERATURE: 99.1 F

## 2023-01-24 DIAGNOSIS — C81.02 NODULAR LYMPHOCYTE PREDOMINANT HODGKIN LYMPHOMA OF INTRATHORACIC LYMPH NODES: Primary | ICD-10-CM

## 2023-01-24 DIAGNOSIS — C81.92 HODGKIN LYMPHOMA OF INTRATHORACIC LYMPH NODES, UNSPECIFIED HODGKIN LYMPHOMA TYPE: ICD-10-CM

## 2023-01-24 LAB
ALBUMIN SERPL-MCNC: 4.1 G/DL (ref 3.5–5.2)
ALBUMIN/GLOB SERPL: 1.4 G/DL
ALP SERPL-CCNC: 77 U/L (ref 39–117)
ALT SERPL W P-5'-P-CCNC: 21 U/L (ref 1–41)
ANION GAP SERPL CALCULATED.3IONS-SCNC: 8.9 MMOL/L (ref 5–15)
AST SERPL-CCNC: 25 U/L (ref 1–40)
BILIRUB SERPL-MCNC: 0.2 MG/DL (ref 0–1.2)
BUN SERPL-MCNC: 19 MG/DL (ref 6–20)
BUN/CREAT SERPL: 23.2 (ref 7–25)
CALCIUM SPEC-SCNC: 9.5 MG/DL (ref 8.6–10.5)
CHLORIDE SERPL-SCNC: 103 MMOL/L (ref 98–107)
CO2 SERPL-SCNC: 27.1 MMOL/L (ref 22–29)
CREAT SERPL-MCNC: 0.82 MG/DL (ref 0.76–1.27)
DEPRECATED RDW RBC AUTO: 56.2 FL (ref 37–54)
EGFRCR SERPLBLD CKD-EPI 2021: 110.4 ML/MIN/1.73
EOSINOPHIL # BLD MANUAL: 0.18 10*3/MM3 (ref 0–0.4)
EOSINOPHIL NFR BLD MANUAL: 6 % (ref 0.3–6.2)
ERYTHROCYTE [DISTWIDTH] IN BLOOD BY AUTOMATED COUNT: 18.9 % (ref 12.3–15.4)
GLOBULIN UR ELPH-MCNC: 3 GM/DL
GLUCOSE SERPL-MCNC: 95 MG/DL (ref 65–99)
HCT VFR BLD AUTO: 41 % (ref 37.5–51)
HGB BLD-MCNC: 13.2 G/DL (ref 13–17.7)
LYMPHOCYTES # BLD MANUAL: 0.83 10*3/MM3 (ref 0.7–3.1)
LYMPHOCYTES NFR BLD MANUAL: 20 % (ref 5–12)
MCH RBC QN AUTO: 26.6 PG (ref 26.6–33)
MCHC RBC AUTO-ENTMCNC: 32.2 G/DL (ref 31.5–35.7)
MCV RBC AUTO: 82.7 FL (ref 79–97)
MONOCYTES # BLD: 0.59 10*3/MM3 (ref 0.1–0.9)
NEUTROPHILS # BLD AUTO: 1.36 10*3/MM3 (ref 1.7–7)
NEUTROPHILS NFR BLD MANUAL: 44 % (ref 42.7–76)
NEUTS BAND NFR BLD MANUAL: 2 % (ref 0–5)
PLATELET # BLD AUTO: 256 10*3/MM3 (ref 140–450)
PMV BLD AUTO: 10.1 FL (ref 6–12)
POTASSIUM SERPL-SCNC: 4.6 MMOL/L (ref 3.5–5.2)
PROT SERPL-MCNC: 7.1 G/DL (ref 6–8.5)
RBC # BLD AUTO: 4.96 10*6/MM3 (ref 4.14–5.8)
RBC MORPH BLD: NORMAL
SCAN SLIDE: NORMAL
SMALL PLATELETS BLD QL SMEAR: ADEQUATE
SODIUM SERPL-SCNC: 139 MMOL/L (ref 136–145)
VARIANT LYMPHS NFR BLD MANUAL: 28 % (ref 19.6–45.3)
WBC MORPH BLD: NORMAL
WBC NRBC COR # BLD: 2.96 10*3/MM3 (ref 3.4–10.8)

## 2023-01-24 PROCEDURE — 25010000002 DACARBAZINE PER 200 MG: Performed by: NURSE PRACTITIONER

## 2023-01-24 PROCEDURE — 85007 BL SMEAR W/DIFF WBC COUNT: CPT

## 2023-01-24 PROCEDURE — 85025 COMPLETE CBC W/AUTO DIFF WBC: CPT

## 2023-01-24 PROCEDURE — 25010000002 FOSAPREPITANT PER 1 MG: Performed by: NURSE PRACTITIONER

## 2023-01-24 PROCEDURE — 25010000002 VINBLASTINE PER 1 MG: Performed by: NURSE PRACTITIONER

## 2023-01-24 PROCEDURE — 25010000002 DEXAMETHASONE SODIUM PHOSPHATE 20 MG/5ML SOLUTION: Performed by: NURSE PRACTITIONER

## 2023-01-24 PROCEDURE — 96367 TX/PROPH/DG ADDL SEQ IV INF: CPT

## 2023-01-24 PROCEDURE — 96366 THER/PROPH/DIAG IV INF ADDON: CPT

## 2023-01-24 PROCEDURE — 80053 COMPREHEN METABOLIC PANEL: CPT

## 2023-01-24 PROCEDURE — 96411 CHEMO IV PUSH ADDL DRUG: CPT

## 2023-01-24 PROCEDURE — 96375 TX/PRO/DX INJ NEW DRUG ADDON: CPT

## 2023-01-24 PROCEDURE — 25010000002 PALONOSETRON 0.25 MG/5ML SOLUTION PREFILLED SYRINGE: Performed by: NURSE PRACTITIONER

## 2023-01-24 PROCEDURE — 96413 CHEMO IV INFUSION 1 HR: CPT

## 2023-01-24 PROCEDURE — 25010000002 HEPARIN LOCK FLUSH PER 10 UNITS: Performed by: INTERNAL MEDICINE

## 2023-01-24 PROCEDURE — 25010000002 DOXORUBICIN PER 10 MG: Performed by: NURSE PRACTITIONER

## 2023-01-24 RX ORDER — SODIUM CHLORIDE 9 MG/ML
250 INJECTION, SOLUTION INTRAVENOUS ONCE
Status: DISCONTINUED | OUTPATIENT
Start: 2023-01-24 | End: 2023-01-24 | Stop reason: HOSPADM

## 2023-01-24 RX ORDER — PALONOSETRON 0.05 MG/ML
0.25 INJECTION, SOLUTION INTRAVENOUS ONCE
Status: COMPLETED | OUTPATIENT
Start: 2023-01-24 | End: 2023-01-24

## 2023-01-24 RX ORDER — HEPARIN SODIUM (PORCINE) LOCK FLUSH IV SOLN 100 UNIT/ML 100 UNIT/ML
500 SOLUTION INTRAVENOUS AS NEEDED
Status: DISCONTINUED | OUTPATIENT
Start: 2023-01-24 | End: 2023-01-24 | Stop reason: HOSPADM

## 2023-01-24 RX ORDER — HEPARIN SODIUM (PORCINE) LOCK FLUSH IV SOLN 100 UNIT/ML 100 UNIT/ML
500 SOLUTION INTRAVENOUS AS NEEDED
Status: CANCELLED | OUTPATIENT
Start: 2023-01-24

## 2023-01-24 RX ORDER — DOXORUBICIN HYDROCHLORIDE 2 MG/ML
25 INJECTION, SOLUTION INTRAVENOUS ONCE
Status: COMPLETED | OUTPATIENT
Start: 2023-01-24 | End: 2023-01-24

## 2023-01-24 RX ORDER — OLANZAPINE 5 MG/1
5 TABLET ORAL ONCE
Status: DISCONTINUED | OUTPATIENT
Start: 2023-01-24 | End: 2023-01-24 | Stop reason: HOSPADM

## 2023-01-24 RX ADMIN — DEXAMETHASONE SODIUM PHOSPHATE 12 MG: 4 INJECTION, SOLUTION INTRAMUSCULAR; INTRAVENOUS at 11:28

## 2023-01-24 RX ADMIN — FOSAPREPITANT 150 MG: 150 INJECTION, POWDER, LYOPHILIZED, FOR SOLUTION INTRAVENOUS at 11:28

## 2023-01-24 RX ADMIN — DOXORUBICIN HYDROCHLORIDE 54 MG: 2 INJECTION, SOLUTION INTRAVENOUS at 12:00

## 2023-01-24 RX ADMIN — HEPARIN 500 UNITS: 100 SYRINGE at 13:04

## 2023-01-24 RX ADMIN — PALONOSETRON 0.25 MG: 0.25 INJECTION, SOLUTION INTRAVENOUS at 11:49

## 2023-01-24 RX ADMIN — VINBLASTINE SULFATE 13 MG: 1 INJECTION INTRAVENOUS at 12:17

## 2023-01-24 RX ADMIN — DACARBAZINE 820 MG: 10 INJECTION, POWDER, FOR SOLUTION INTRAVENOUS at 12:31

## 2023-02-02 ENCOUNTER — HOSPITAL ENCOUNTER (OUTPATIENT)
Dept: PULMONOLOGY | Facility: HOSPITAL | Age: 48
Discharge: HOME | End: 2023-02-02
Payer: COMMERCIAL

## 2023-02-02 ENCOUNTER — HOSPITAL ENCOUNTER (OUTPATIENT)
Dept: CARDIOLOGY | Facility: HOSPITAL | Age: 48
Discharge: HOME | End: 2023-02-02
Payer: COMMERCIAL

## 2023-02-02 DIAGNOSIS — C81.02 NODULAR LYMPHOCYTE PREDOMINANT HODGKIN LYMPHOMA OF INTRATHORACIC LYMPH NODES: ICD-10-CM

## 2023-02-02 LAB
BH CV ECHO LEFT VENTRICLE GLOBAL LONGITUDINAL STRAIN: -18.6 %
BH CV ECHO MEAS - AO MAX PG: 7 MMHG
BH CV ECHO MEAS - AO MEAN PG: 4 MMHG
BH CV ECHO MEAS - AO ROOT DIAM: 3.2 CM
BH CV ECHO MEAS - AO V2 MAX: 132 CM/SEC
BH CV ECHO MEAS - AO V2 VTI: 25.5 CM
BH CV ECHO MEAS - AVA(I,D): 2.27 CM2
BH CV ECHO MEAS - EDV(CUBED): 125 ML
BH CV ECHO MEAS - EDV(MOD-SP2): 154 ML
BH CV ECHO MEAS - EDV(MOD-SP4): 166 ML
BH CV ECHO MEAS - EF(MOD-BP): 56.4 %
BH CV ECHO MEAS - EF(MOD-SP2): 57.3 %
BH CV ECHO MEAS - EF(MOD-SP4): 55.4 %
BH CV ECHO MEAS - ESV(CUBED): 64 ML
BH CV ECHO MEAS - ESV(MOD-SP2): 65.8 ML
BH CV ECHO MEAS - ESV(MOD-SP4): 74 ML
BH CV ECHO MEAS - FS: 20 %
BH CV ECHO MEAS - IVS/LVPW: 1 CM
BH CV ECHO MEAS - IVSD: 1.1 CM
BH CV ECHO MEAS - LA DIMENSION: 3.3 CM
BH CV ECHO MEAS - LAT PEAK E' VEL: 18.8 CM/SEC
BH CV ECHO MEAS - LV MASS(C)D: 207.1 GRAMS
BH CV ECHO MEAS - LV MAX PG: 4.2 MMHG
BH CV ECHO MEAS - LV MEAN PG: 2 MMHG
BH CV ECHO MEAS - LV V1 MAX: 103 CM/SEC
BH CV ECHO MEAS - LV V1 VTI: 18.4 CM
BH CV ECHO MEAS - LVIDD: 5 CM
BH CV ECHO MEAS - LVIDS: 4 CM
BH CV ECHO MEAS - LVOT AREA: 3.1 CM2
BH CV ECHO MEAS - LVOT DIAM: 2 CM
BH CV ECHO MEAS - LVPWD: 1.1 CM
BH CV ECHO MEAS - MED PEAK E' VEL: 8.6 CM/SEC
BH CV ECHO MEAS - MV A MAX VEL: 69.7 CM/SEC
BH CV ECHO MEAS - MV DEC TIME: 0.3 MSEC
BH CV ECHO MEAS - MV E MAX VEL: 69.2 CM/SEC
BH CV ECHO MEAS - MV E/A: 0.99
BH CV ECHO MEAS - PA ACC TIME: 0.14 SEC
BH CV ECHO MEAS - PA PR(ACCEL): 15.5 MMHG
BH CV ECHO MEAS - PA V2 MAX: 113 CM/SEC
BH CV ECHO MEAS - RAP SYSTOLE: 3 MMHG
BH CV ECHO MEAS - RVSP: 21 MMHG
BH CV ECHO MEAS - SV(LVOT): 57.8 ML
BH CV ECHO MEAS - SV(MOD-SP2): 88.2 ML
BH CV ECHO MEAS - SV(MOD-SP4): 92 ML
BH CV ECHO MEAS - TAPSE (>1.6): 2.04 CM
BH CV ECHO MEAS - TR MAX PG: 18.3 MMHG
BH CV ECHO MEAS - TR MAX VEL: 214 CM/SEC
BH CV ECHO MEASUREMENTS AVERAGE E/E' RATIO: 5.05
BH CV XLRA - RV BASE: 3.8 CM
BH CV XLRA - RV LENGTH: 6.2 CM
BH CV XLRA - RV MID: 2.5 CM
BH CV XLRA - TDI S': 10.3 CM/SEC
LEFT ATRIUM VOLUME INDEX: 28 ML/M2
LV EF 2D ECHO EST: 55 %
MAXIMAL PREDICTED HEART RATE: 175 BPM
STRESS TARGET HR: 149 BPM

## 2023-02-02 PROCEDURE — 94729 DIFFUSING CAPACITY: CPT | Performed by: INTERNAL MEDICINE

## 2023-02-02 PROCEDURE — 94727 GAS DIL/WSHOT DETER LNG VOL: CPT

## 2023-02-02 PROCEDURE — 94010 BREATHING CAPACITY TEST: CPT

## 2023-02-02 PROCEDURE — 93306 TTE W/DOPPLER COMPLETE: CPT

## 2023-02-02 PROCEDURE — 93306 TTE W/DOPPLER COMPLETE: CPT | Performed by: INTERNAL MEDICINE

## 2023-02-02 PROCEDURE — 94727 GAS DIL/WSHOT DETER LNG VOL: CPT | Performed by: INTERNAL MEDICINE

## 2023-02-02 PROCEDURE — 94729 DIFFUSING CAPACITY: CPT

## 2023-02-02 PROCEDURE — 93356 MYOCRD STRAIN IMG SPCKL TRCK: CPT

## 2023-02-02 PROCEDURE — 94010 BREATHING CAPACITY TEST: CPT | Performed by: INTERNAL MEDICINE

## 2023-02-02 PROCEDURE — 93356 MYOCRD STRAIN IMG SPCKL TRCK: CPT | Performed by: INTERNAL MEDICINE

## 2023-02-07 ENCOUNTER — INFUSION (OUTPATIENT)
Dept: ONCOLOGY | Facility: HOSPITAL | Age: 48
End: 2023-02-07
Payer: COMMERCIAL

## 2023-02-07 ENCOUNTER — OFFICE VISIT (OUTPATIENT)
Dept: ONCOLOGY | Facility: CLINIC | Age: 48
End: 2023-02-07
Payer: COMMERCIAL

## 2023-02-07 VITALS
HEIGHT: 74 IN | OXYGEN SATURATION: 98 % | TEMPERATURE: 97.8 F | RESPIRATION RATE: 12 BRPM | BODY MASS INDEX: 27.85 KG/M2 | DIASTOLIC BLOOD PRESSURE: 71 MMHG | HEART RATE: 74 BPM | SYSTOLIC BLOOD PRESSURE: 123 MMHG | WEIGHT: 217 LBS

## 2023-02-07 DIAGNOSIS — C81.92 HODGKIN LYMPHOMA OF INTRATHORACIC LYMPH NODES, UNSPECIFIED HODGKIN LYMPHOMA TYPE: ICD-10-CM

## 2023-02-07 DIAGNOSIS — C81.02 NODULAR LYMPHOCYTE PREDOMINANT HODGKIN LYMPHOMA OF INTRATHORACIC LYMPH NODES: Primary | ICD-10-CM

## 2023-02-07 LAB
ALBUMIN SERPL-MCNC: 4.3 G/DL (ref 3.5–5.2)
ALBUMIN/GLOB SERPL: 1.7 G/DL
ALP SERPL-CCNC: 69 U/L (ref 39–117)
ALT SERPL W P-5'-P-CCNC: 24 U/L (ref 1–41)
ANION GAP SERPL CALCULATED.3IONS-SCNC: 10.4 MMOL/L (ref 5–15)
AST SERPL-CCNC: 27 U/L (ref 1–40)
BASOPHILS # BLD AUTO: 0.04 10*3/MM3 (ref 0–0.2)
BASOPHILS NFR BLD AUTO: 1 % (ref 0–1.5)
BILIRUB SERPL-MCNC: 0.2 MG/DL (ref 0–1.2)
BUN SERPL-MCNC: 18 MG/DL (ref 6–20)
BUN/CREAT SERPL: 20 (ref 7–25)
CALCIUM SPEC-SCNC: 9.5 MG/DL (ref 8.6–10.5)
CHLORIDE SERPL-SCNC: 102 MMOL/L (ref 98–107)
CO2 SERPL-SCNC: 25.6 MMOL/L (ref 22–29)
CREAT SERPL-MCNC: 0.9 MG/DL (ref 0.76–1.27)
DEPRECATED RDW RBC AUTO: 55.4 FL (ref 37–54)
EGFRCR SERPLBLD CKD-EPI 2021: 107.3 ML/MIN/1.73
EOSINOPHIL # BLD AUTO: 0.27 10*3/MM3 (ref 0–0.4)
EOSINOPHIL NFR BLD AUTO: 7 % (ref 0.3–6.2)
ERYTHROCYTE [DISTWIDTH] IN BLOOD BY AUTOMATED COUNT: 18.2 % (ref 12.3–15.4)
GLOBULIN UR ELPH-MCNC: 2.5 GM/DL
GLUCOSE SERPL-MCNC: 96 MG/DL (ref 65–99)
HCT VFR BLD AUTO: 39.9 % (ref 37.5–51)
HGB BLD-MCNC: 12.8 G/DL (ref 13–17.7)
IMM GRANULOCYTES # BLD AUTO: 0.01 10*3/MM3 (ref 0–0.05)
IMM GRANULOCYTES NFR BLD AUTO: 0.3 % (ref 0–0.5)
LYMPHOCYTES # BLD AUTO: 0.55 10*3/MM3 (ref 0.7–3.1)
LYMPHOCYTES NFR BLD AUTO: 14.3 % (ref 19.6–45.3)
MCH RBC QN AUTO: 27 PG (ref 26.6–33)
MCHC RBC AUTO-ENTMCNC: 32.1 G/DL (ref 31.5–35.7)
MCV RBC AUTO: 84.2 FL (ref 79–97)
MONOCYTES # BLD AUTO: 0.72 10*3/MM3 (ref 0.1–0.9)
MONOCYTES NFR BLD AUTO: 18.8 % (ref 5–12)
NEUTROPHILS NFR BLD AUTO: 2.25 10*3/MM3 (ref 1.7–7)
NEUTROPHILS NFR BLD AUTO: 58.6 % (ref 42.7–76)
NRBC BLD AUTO-RTO: 0 /100 WBC (ref 0–0.2)
PLATELET # BLD AUTO: 223 10*3/MM3 (ref 140–450)
PMV BLD AUTO: 10.5 FL (ref 6–12)
POTASSIUM SERPL-SCNC: 4.1 MMOL/L (ref 3.5–5.2)
PROT SERPL-MCNC: 6.8 G/DL (ref 6–8.5)
RBC # BLD AUTO: 4.74 10*6/MM3 (ref 4.14–5.8)
SODIUM SERPL-SCNC: 138 MMOL/L (ref 136–145)
WBC NRBC COR # BLD: 3.84 10*3/MM3 (ref 3.4–10.8)

## 2023-02-07 PROCEDURE — 25010000002 PALONOSETRON 0.25 MG/5ML SOLUTION PREFILLED SYRINGE: Performed by: INTERNAL MEDICINE

## 2023-02-07 PROCEDURE — 25010000002 DEXAMETHASONE SODIUM PHOSPHATE 20 MG/5ML SOLUTION: Performed by: INTERNAL MEDICINE

## 2023-02-07 PROCEDURE — 96366 THER/PROPH/DIAG IV INF ADDON: CPT

## 2023-02-07 PROCEDURE — 25010000002 VINBLASTINE PER 1 MG: Performed by: INTERNAL MEDICINE

## 2023-02-07 PROCEDURE — 25010000002 HEPARIN LOCK FLUSH PER 10 UNITS: Performed by: INTERNAL MEDICINE

## 2023-02-07 PROCEDURE — 96413 CHEMO IV INFUSION 1 HR: CPT

## 2023-02-07 PROCEDURE — 99214 OFFICE O/P EST MOD 30 MIN: CPT | Performed by: INTERNAL MEDICINE

## 2023-02-07 PROCEDURE — 96411 CHEMO IV PUSH ADDL DRUG: CPT

## 2023-02-07 PROCEDURE — 96367 TX/PROPH/DG ADDL SEQ IV INF: CPT

## 2023-02-07 PROCEDURE — 80053 COMPREHEN METABOLIC PANEL: CPT

## 2023-02-07 PROCEDURE — 96375 TX/PRO/DX INJ NEW DRUG ADDON: CPT

## 2023-02-07 PROCEDURE — 25010000002 DACARBAZINE PER 200 MG: Performed by: INTERNAL MEDICINE

## 2023-02-07 PROCEDURE — 85025 COMPLETE CBC W/AUTO DIFF WBC: CPT

## 2023-02-07 PROCEDURE — 25010000002 DOXORUBICIN PER 10 MG: Performed by: INTERNAL MEDICINE

## 2023-02-07 PROCEDURE — 25010000002 FOSAPREPITANT PER 1 MG: Performed by: INTERNAL MEDICINE

## 2023-02-07 RX ORDER — PALONOSETRON 0.05 MG/ML
0.25 INJECTION, SOLUTION INTRAVENOUS ONCE
Status: COMPLETED | OUTPATIENT
Start: 2023-02-07 | End: 2023-02-07

## 2023-02-07 RX ORDER — PALONOSETRON 0.05 MG/ML
0.25 INJECTION, SOLUTION INTRAVENOUS ONCE
Status: CANCELLED | OUTPATIENT
Start: 2023-02-21

## 2023-02-07 RX ORDER — SODIUM CHLORIDE 9 MG/ML
250 INJECTION, SOLUTION INTRAVENOUS ONCE
Status: CANCELLED | OUTPATIENT
Start: 2023-02-21

## 2023-02-07 RX ORDER — HEPARIN SODIUM (PORCINE) LOCK FLUSH IV SOLN 100 UNIT/ML 100 UNIT/ML
500 SOLUTION INTRAVENOUS AS NEEDED
Status: CANCELLED | OUTPATIENT
Start: 2023-02-07

## 2023-02-07 RX ORDER — OLANZAPINE 5 MG/1
5 TABLET ORAL ONCE
Status: CANCELLED | OUTPATIENT
Start: 2023-02-21 | End: 2023-02-21

## 2023-02-07 RX ORDER — DOXORUBICIN HYDROCHLORIDE 2 MG/ML
25 INJECTION, SOLUTION INTRAVENOUS ONCE
Status: CANCELLED | OUTPATIENT
Start: 2023-02-21

## 2023-02-07 RX ORDER — DOXORUBICIN HYDROCHLORIDE 2 MG/ML
25 INJECTION, SOLUTION INTRAVENOUS ONCE
Status: CANCELLED | OUTPATIENT
Start: 2023-02-07

## 2023-02-07 RX ORDER — OLANZAPINE 5 MG/1
5 TABLET ORAL ONCE
Status: DISCONTINUED | OUTPATIENT
Start: 2023-02-07 | End: 2023-02-07 | Stop reason: HOSPADM

## 2023-02-07 RX ORDER — DOXORUBICIN HYDROCHLORIDE 2 MG/ML
25 INJECTION, SOLUTION INTRAVENOUS ONCE
Status: COMPLETED | OUTPATIENT
Start: 2023-02-07 | End: 2023-02-07

## 2023-02-07 RX ORDER — OLANZAPINE 5 MG/1
5 TABLET ORAL ONCE
Status: CANCELLED | OUTPATIENT
Start: 2023-02-07 | End: 2023-02-07

## 2023-02-07 RX ORDER — PALONOSETRON 0.05 MG/ML
0.25 INJECTION, SOLUTION INTRAVENOUS ONCE
Status: CANCELLED | OUTPATIENT
Start: 2023-02-07

## 2023-02-07 RX ORDER — SODIUM CHLORIDE 9 MG/ML
250 INJECTION, SOLUTION INTRAVENOUS ONCE
Status: COMPLETED | OUTPATIENT
Start: 2023-02-07 | End: 2023-02-07

## 2023-02-07 RX ORDER — HEPARIN SODIUM (PORCINE) LOCK FLUSH IV SOLN 100 UNIT/ML 100 UNIT/ML
500 SOLUTION INTRAVENOUS AS NEEDED
Status: DISCONTINUED | OUTPATIENT
Start: 2023-02-07 | End: 2023-02-07 | Stop reason: HOSPADM

## 2023-02-07 RX ORDER — SODIUM CHLORIDE 9 MG/ML
250 INJECTION, SOLUTION INTRAVENOUS ONCE
Status: CANCELLED | OUTPATIENT
Start: 2023-02-07

## 2023-02-07 RX ADMIN — FOSAPREPITANT 150 MG: 150 INJECTION, POWDER, LYOPHILIZED, FOR SOLUTION INTRAVENOUS at 12:34

## 2023-02-07 RX ADMIN — VINBLASTINE SULFATE 13 MG: 1 INJECTION INTRAVENOUS at 13:55

## 2023-02-07 RX ADMIN — DEXAMETHASONE SODIUM PHOSPHATE 12 MG: 4 INJECTION, SOLUTION INTRAMUSCULAR; INTRAVENOUS at 12:38

## 2023-02-07 RX ADMIN — HEPARIN 500 UNITS: 100 SYRINGE at 14:45

## 2023-02-07 RX ADMIN — SODIUM CHLORIDE 250 ML: 9 INJECTION, SOLUTION INTRAVENOUS at 13:30

## 2023-02-07 RX ADMIN — DOXORUBICIN HYDROCHLORIDE 54 MG: 2 INJECTION, SOLUTION INTRAVENOUS at 13:40

## 2023-02-07 RX ADMIN — DACARBAZINE 820 MG: 10 INJECTION, POWDER, FOR SOLUTION INTRAVENOUS at 14:13

## 2023-02-07 RX ADMIN — PALONOSETRON 0.25 MG: 0.25 INJECTION, SOLUTION INTRAVENOUS at 12:47

## 2023-02-07 NOTE — PROGRESS NOTES
"      PROBLEM LIST:  1.  Hodgkin lymphoma, stage IIB  A) bone marrow biopsy done for chronic anemia 8/12/2022 showed a normocellular bone marrow with maturing trilineage hematopoiesis, increased stainable marrow iron, no evidence of dysplasia, fibrosis, or increased blasts.  Labs showed no evidence of nutrient deficiency, or heavy metal toxicity.  B) CT chest abdomen pelvis on 9/7/2022 showed extensive intrathoracic and supraclavicular lymphadenopathy, largest superior mediastinal lymph node 3.8 cm, left supraclavicular lymph node 2.8 cm, right supraclavicular lymph node 2.7 cm.  Hypodense lesions in the liver most likely benign cysts.  C) supraclavicular lymph node biopsy on 9/30/2022 showed nodular sclerosing Hodgkin lymphoma.  PET/CT on 10/10/2022 showed enlarged and hypermetabolic mediastinal lymphadenopathy, contiguous to lower cervical regions.  No other sites of disease.  D) treatment with ABVD started 10/18/2022.  PET after 2 cycles showed resolution of hypermetasolic activity.  Completed an additional 4 cycles of AVD.  2. Weight loss    Subjective     CHIEF COMPLAINT: anemia    HISTORY OF PRESENT ILLNESS:   William Maria returns for follow-up.  He continues to do well.  He is going to the gym and lifting some although he is avoiding chest exercises because of his port.  He had PFTs and echocardiogram last week.      Objective      /71   Pulse 74   Temp 97.8 °F (36.6 °C) (Temporal)   Resp 12   Ht 188 cm (74\")   Wt 98.4 kg (217 lb)   SpO2 98%   BMI 27.86 kg/m²      Performance Status:0              General: well appearing male in no acute distress  Neuro: alert and oriented  HEENT: sclera anicteric, oropharynx clear  Lymphatics: no cervical, supraclavicular, or axillary adenopathy  Cardiovascular: regular rate and rhythm, no murmurs  Lungs: clear to auscultation bilaterally  Extremeties: no lower extremity edema  Skin: no rashes, lesions, bruising, or petechiae  Psych: mood and affect " appropriate          RECENT LABS:    Lab Results   Component Value Date    WBC 2.96 (L) 2023    HGB 13.2 2023    HCT 41.0 2023    MCV 82.7 2023     2023     Lab Results   Component Value Date    GLUCOSE 95 2023    BUN 19 2023    CREATININE 0.82 2023    BCR 23.2 2023    K 4.6 2023    CO2 27.1 2023    CALCIUM 9.5 2023    PROTENTOTREF 7.1 2022    ALBUMIN 4.1 2023    LABIL2 0.8 2022    AST 25 2023    ALT 21 2023       Pulmonary Function Test  PFT interpretation    PATIENT NAME:  William Maria   MRN:  [id number]   :  1975; Age:  47 y.o.    Impression:    1. Available data suggest normal spirometry.  FEV1 5.13 L, 111 %   predicted.   2. Lung volume reveals normal.       3. DLCO is normal.    Enoc Westfall MD, [facility]  Pulmonary, Critical Care and Sleep Medicine    Results for orders placed during the hospital encounter of 23    Adult Transthoracic Echo Complete W/ Cont if Necessary Per Protocol    Interpretation Summary  •  Left ventricular systolic function is normal. Estimated left ventricular EF = 55%  •  Left ventricular diastolic function was normal.  •  The cardiac valves are anatomically and functionally normal.  •  Estimated right ventricular systolic pressure from tricuspid regurgitation is normal (<35 mmHg).  •  Normal global longitudinal strain pattern (18.6)              ASSESSMENT AND PLAN:     William Maria is a 47 y.o. male with stage IIb Hodgkin lymphoma, here for follow-up.    Hodgkin lymphoma: He continues to do very well with treatment.  We will proceed with cycle 5 of ABVD today.  We discussed that about a month following cycle 6 we will repeat a PET scan for his end of treatment assessment.    Follow-up in 4 weeks.        Total time of patient care including time prior to, face to face with patient, and following visit spent in reviewing records, lab results,  discussion with patient, and documentation/charting was > 31 minutes                  Tamie Mccullough MD  [facility] Hematology and Oncology    2/7/2023          CC:

## 2023-02-21 ENCOUNTER — INFUSION (OUTPATIENT)
Dept: ONCOLOGY | Facility: HOSPITAL | Age: 48
End: 2023-02-21
Payer: COMMERCIAL

## 2023-02-21 VITALS
DIASTOLIC BLOOD PRESSURE: 81 MMHG | HEART RATE: 65 BPM | TEMPERATURE: 98.2 F | BODY MASS INDEX: 27.99 KG/M2 | WEIGHT: 218 LBS | SYSTOLIC BLOOD PRESSURE: 130 MMHG

## 2023-02-21 DIAGNOSIS — C81.92 HODGKIN LYMPHOMA OF INTRATHORACIC LYMPH NODES, UNSPECIFIED HODGKIN LYMPHOMA TYPE: ICD-10-CM

## 2023-02-21 DIAGNOSIS — C81.02 NODULAR LYMPHOCYTE PREDOMINANT HODGKIN LYMPHOMA OF INTRATHORACIC LYMPH NODES: Primary | ICD-10-CM

## 2023-02-21 LAB
ALBUMIN SERPL-MCNC: 4.2 G/DL (ref 3.5–5.2)
ALBUMIN/GLOB SERPL: 1.8 G/DL
ALP SERPL-CCNC: 64 U/L (ref 39–117)
ALT SERPL W P-5'-P-CCNC: 23 U/L (ref 1–41)
ANION GAP SERPL CALCULATED.3IONS-SCNC: 8.6 MMOL/L (ref 5–15)
AST SERPL-CCNC: 26 U/L (ref 1–40)
BASOPHILS # BLD AUTO: 0.05 10*3/MM3 (ref 0–0.2)
BASOPHILS NFR BLD AUTO: 1.7 % (ref 0–1.5)
BILIRUB SERPL-MCNC: 0.2 MG/DL (ref 0–1.2)
BUN SERPL-MCNC: 19 MG/DL (ref 6–20)
BUN/CREAT SERPL: 20.7 (ref 7–25)
CALCIUM SPEC-SCNC: 9.3 MG/DL (ref 8.6–10.5)
CHLORIDE SERPL-SCNC: 104 MMOL/L (ref 98–107)
CO2 SERPL-SCNC: 26.4 MMOL/L (ref 22–29)
CREAT SERPL-MCNC: 0.92 MG/DL (ref 0.76–1.27)
DEPRECATED RDW RBC AUTO: 52.3 FL (ref 37–54)
EGFRCR SERPLBLD CKD-EPI 2021: 104.5 ML/MIN/1.73
EOSINOPHIL # BLD AUTO: 0.37 10*3/MM3 (ref 0–0.4)
EOSINOPHIL NFR BLD AUTO: 12.4 % (ref 0.3–6.2)
ERYTHROCYTE [DISTWIDTH] IN BLOOD BY AUTOMATED COUNT: 17.1 % (ref 12.3–15.4)
GLOBULIN UR ELPH-MCNC: 2.4 GM/DL
GLUCOSE SERPL-MCNC: 93 MG/DL (ref 65–99)
HCT VFR BLD AUTO: 39.9 % (ref 37.5–51)
HGB BLD-MCNC: 12.9 G/DL (ref 13–17.7)
IMM GRANULOCYTES # BLD AUTO: 0.02 10*3/MM3 (ref 0–0.05)
IMM GRANULOCYTES NFR BLD AUTO: 0.7 % (ref 0–0.5)
LYMPHOCYTES # BLD AUTO: 0.75 10*3/MM3 (ref 0.7–3.1)
LYMPHOCYTES NFR BLD AUTO: 25.1 % (ref 19.6–45.3)
MCH RBC QN AUTO: 27 PG (ref 26.6–33)
MCHC RBC AUTO-ENTMCNC: 32.3 G/DL (ref 31.5–35.7)
MCV RBC AUTO: 83.6 FL (ref 79–97)
MONOCYTES # BLD AUTO: 0.57 10*3/MM3 (ref 0.1–0.9)
MONOCYTES NFR BLD AUTO: 19.1 % (ref 5–12)
NEUTROPHILS NFR BLD AUTO: 1.23 10*3/MM3 (ref 1.7–7)
NEUTROPHILS NFR BLD AUTO: 41 % (ref 42.7–76)
NRBC BLD AUTO-RTO: 0 /100 WBC (ref 0–0.2)
PLATELET # BLD AUTO: 227 10*3/MM3 (ref 140–450)
PMV BLD AUTO: 10.5 FL (ref 6–12)
POTASSIUM SERPL-SCNC: 4.2 MMOL/L (ref 3.5–5.2)
PROT SERPL-MCNC: 6.6 G/DL (ref 6–8.5)
RBC # BLD AUTO: 4.77 10*6/MM3 (ref 4.14–5.8)
SODIUM SERPL-SCNC: 139 MMOL/L (ref 136–145)
WBC NRBC COR # BLD: 2.99 10*3/MM3 (ref 3.4–10.8)

## 2023-02-21 PROCEDURE — 25010000002 FOSAPREPITANT PER 1 MG: Performed by: INTERNAL MEDICINE

## 2023-02-21 PROCEDURE — 96413 CHEMO IV INFUSION 1 HR: CPT

## 2023-02-21 PROCEDURE — 25010000002 VINBLASTINE PER 1 MG: Performed by: INTERNAL MEDICINE

## 2023-02-21 PROCEDURE — 96411 CHEMO IV PUSH ADDL DRUG: CPT

## 2023-02-21 PROCEDURE — 96367 TX/PROPH/DG ADDL SEQ IV INF: CPT

## 2023-02-21 PROCEDURE — 25010000002 DACARBAZINE PER 200 MG: Performed by: INTERNAL MEDICINE

## 2023-02-21 PROCEDURE — 25010000002 DEXAMETHASONE SODIUM PHOSPHATE 20 MG/5ML SOLUTION: Performed by: INTERNAL MEDICINE

## 2023-02-21 PROCEDURE — 85025 COMPLETE CBC W/AUTO DIFF WBC: CPT

## 2023-02-21 PROCEDURE — 25010000002 PALONOSETRON 0.25 MG/5ML SOLUTION PREFILLED SYRINGE: Performed by: INTERNAL MEDICINE

## 2023-02-21 PROCEDURE — 80053 COMPREHEN METABOLIC PANEL: CPT

## 2023-02-21 PROCEDURE — 25010000002 DOXORUBICIN PER 10 MG: Performed by: INTERNAL MEDICINE

## 2023-02-21 PROCEDURE — 96375 TX/PRO/DX INJ NEW DRUG ADDON: CPT

## 2023-02-21 PROCEDURE — 96366 THER/PROPH/DIAG IV INF ADDON: CPT

## 2023-02-21 PROCEDURE — 25010000002 HEPARIN LOCK FLUSH PER 10 UNITS: Performed by: INTERNAL MEDICINE

## 2023-02-21 RX ORDER — SODIUM CHLORIDE 9 MG/ML
250 INJECTION, SOLUTION INTRAVENOUS ONCE
Status: COMPLETED | OUTPATIENT
Start: 2023-02-21 | End: 2023-02-21

## 2023-02-21 RX ORDER — HEPARIN SODIUM (PORCINE) LOCK FLUSH IV SOLN 100 UNIT/ML 100 UNIT/ML
500 SOLUTION INTRAVENOUS AS NEEDED
Status: DISCONTINUED | OUTPATIENT
Start: 2023-02-21 | End: 2023-02-21 | Stop reason: HOSPADM

## 2023-02-21 RX ORDER — DOXORUBICIN HYDROCHLORIDE 2 MG/ML
25 INJECTION, SOLUTION INTRAVENOUS ONCE
Status: COMPLETED | OUTPATIENT
Start: 2023-02-21 | End: 2023-02-21

## 2023-02-21 RX ORDER — HEPARIN SODIUM (PORCINE) LOCK FLUSH IV SOLN 100 UNIT/ML 100 UNIT/ML
500 SOLUTION INTRAVENOUS AS NEEDED
Status: CANCELLED | OUTPATIENT
Start: 2023-02-21

## 2023-02-21 RX ORDER — PALONOSETRON 0.05 MG/ML
0.25 INJECTION, SOLUTION INTRAVENOUS ONCE
Status: COMPLETED | OUTPATIENT
Start: 2023-02-21 | End: 2023-02-21

## 2023-02-21 RX ORDER — OLANZAPINE 5 MG/1
5 TABLET ORAL ONCE
Status: DISCONTINUED | OUTPATIENT
Start: 2023-02-21 | End: 2023-02-21 | Stop reason: HOSPADM

## 2023-02-21 RX ADMIN — DACARBAZINE 820 MG: 10 INJECTION, POWDER, FOR SOLUTION INTRAVENOUS at 13:13

## 2023-02-21 RX ADMIN — HEPARIN 500 UNITS: 100 SYRINGE at 13:47

## 2023-02-21 RX ADMIN — SODIUM CHLORIDE 250 ML: 9 INJECTION, SOLUTION INTRAVENOUS at 12:39

## 2023-02-21 RX ADMIN — DEXAMETHASONE SODIUM PHOSPHATE 12 MG: 4 INJECTION, SOLUTION INTRAMUSCULAR; INTRAVENOUS at 12:09

## 2023-02-21 RX ADMIN — SODIUM CHLORIDE 150 MG: 9 INJECTION, SOLUTION INTRAVENOUS at 12:08

## 2023-02-21 RX ADMIN — VINBLASTINE SULFATE 13 MG: 1 INJECTION INTRAVENOUS at 12:54

## 2023-02-21 RX ADMIN — DOXORUBICIN HYDROCHLORIDE 54 MG: 2 INJECTION, SOLUTION INTRAVENOUS at 12:40

## 2023-02-21 RX ADMIN — PALONOSETRON 0.25 MG: 0.25 INJECTION, SOLUTION INTRAVENOUS at 12:09

## 2023-03-06 NOTE — PROGRESS NOTES
"      PROBLEM LIST:  1.  Hodgkin lymphoma, stage IIB  A) bone marrow biopsy done for chronic anemia 8/12/2022 showed a normocellular bone marrow with maturing trilineage hematopoiesis, increased stainable marrow iron, no evidence of dysplasia, fibrosis, or increased blasts.  Labs showed no evidence of nutrient deficiency, or heavy metal toxicity.  B) CT chest abdomen pelvis on 9/7/2022 showed extensive intrathoracic and supraclavicular lymphadenopathy, largest superior mediastinal lymph node 3.8 cm, left supraclavicular lymph node 2.8 cm, right supraclavicular lymph node 2.7 cm.  Hypodense lesions in the liver most likely benign cysts.  C) supraclavicular lymph node biopsy on 9/30/2022 showed nodular sclerosing Hodgkin lymphoma.  PET/CT on 10/10/2022 showed enlarged and hypermetabolic mediastinal lymphadenopathy, contiguous to lower cervical regions.  No other sites of disease.  D) treatment with ABVD started 10/18/2022.  PET after 2 cycles showed resolution of hypermetasolic activity.  Completed an additional 4 cycles of AVD.  2. Weight loss    Subjective     CHIEF COMPLAINT: anemia    HISTORY OF PRESENT ILLNESS:   William Maria returns for follow-up.  He continues tolerating treatment well.  He continues going to the gym as well as running.  He says he is avoiding chest exercises because he feels pulling in his port.  He is down to at 8.40-minute mile.  He said this is just 2 minutes more than what he was prior to getting sick.  He says he feels like he is doing well.  He is ready to get this final cycle over with and is wondering what our plan he is at that point.      Objective      /86   Pulse 70   Temp 98 °F (36.7 °C) (Infrared)   Resp 12   Ht 188 cm (74.02\")   Wt 99.8 kg (220 lb)   SpO2 99%   BMI 28.23 kg/m²      Performance Status:0  ECOG score: 0           General: well appearing male in no acute distress  Neuro: alert and oriented  HEENT: sclera anicteric, oropharynx clear  Lymphatics: " no cervical, supraclavicular, or axillary adenopathy  Cardiovascular: regular rate and rhythm, no murmurs  Lungs: clear to auscultation bilaterally  Extremeties: no lower extremity edema  Skin: no rashes, lesions, bruising, or petechiae  Psych: mood and affect appropriate          RECENT LABS:    Lab Results   Component Value Date    WBC 2.99 (L) 2023    HGB 12.9 (L) 2023    HCT 39.9 2023    MCV 83.6 2023     2023     Lab Results   Component Value Date    GLUCOSE 93 2023    BUN 19 2023    CREATININE 0.92 2023    BCR 20.7 2023    K 4.2 2023    CO2 26.4 2023    CALCIUM 9.3 2023    PROTENTOTREF 7.1 2022    ALBUMIN 4.2 2023    LABIL2 0.8 2022    AST 26 2023    ALT 23 2023       Pulmonary Function Test  PFT interpretation    PATIENT NAME:  William Maria   MRN:  [id number]   :  1975; Age:  47 y.o.    Impression:    1. Available data suggest normal spirometry.  FEV1 5.13 L, 111 %   predicted.   2. Lung volume reveals normal.       3. DLCO is normal.    Eonc Westfall MD, [facility]  Pulmonary, Critical Care and Sleep Medicine    Results for orders placed during the hospital encounter of 23    Adult Transthoracic Echo Complete W/ Cont if Necessary Per Protocol    Interpretation Summary  •  Left ventricular systolic function is normal. Estimated left ventricular EF = 55%  •  Left ventricular diastolic function was normal.  •  The cardiac valves are anatomically and functionally normal.  •  Estimated right ventricular systolic pressure from tricuspid regurgitation is normal (<35 mmHg).  •  Normal global longitudinal strain pattern (18.6)              ASSESSMENT AND PLAN:     William Maria is a 47 y.o. male with stage IIb Hodgkin lymphoma, here for follow-up.    Hodgkin lymphoma: Overall, he continues to tolerate treatment well.  We will proceed with cycle #6 of ABVD today.  We will plan to  repeat PET scan in the next month.  I will order prior to return.    We discussed that we will check a PET scan and labs prior to return.  Assuming, PET shows no evidence of disease we will plan to check labs in 3 months and scans and labs in 6 months. Port flushes every 6 weeks.    Follow-up in 4 weeks.        Total time of patient care including time prior to, face to face with patient, and following visit spent in reviewing records, lab results, imaging studies, discussion with patient, and documentation/charting was > 30 minutes                    Ashanti Spicer, ASHLYN  [facility] Hematology and Oncology    3/7/2023          CC:

## 2023-03-07 ENCOUNTER — OFFICE VISIT (OUTPATIENT)
Dept: ONCOLOGY | Facility: CLINIC | Age: 48
End: 2023-03-07
Payer: COMMERCIAL

## 2023-03-07 ENCOUNTER — INFUSION (OUTPATIENT)
Dept: ONCOLOGY | Facility: HOSPITAL | Age: 48
End: 2023-03-07
Payer: COMMERCIAL

## 2023-03-07 VITALS
HEIGHT: 74 IN | WEIGHT: 220 LBS | OXYGEN SATURATION: 99 % | DIASTOLIC BLOOD PRESSURE: 86 MMHG | TEMPERATURE: 98 F | BODY MASS INDEX: 28.23 KG/M2 | HEART RATE: 70 BPM | RESPIRATION RATE: 12 BRPM | SYSTOLIC BLOOD PRESSURE: 130 MMHG

## 2023-03-07 DIAGNOSIS — C81.92 HODGKIN LYMPHOMA OF INTRATHORACIC LYMPH NODES, UNSPECIFIED HODGKIN LYMPHOMA TYPE: ICD-10-CM

## 2023-03-07 DIAGNOSIS — C81.02 NODULAR LYMPHOCYTE PREDOMINANT HODGKIN LYMPHOMA OF INTRATHORACIC LYMPH NODES: Primary | ICD-10-CM

## 2023-03-07 LAB
ALBUMIN SERPL-MCNC: 4.1 G/DL (ref 3.5–5.2)
ALBUMIN/GLOB SERPL: 1.6 G/DL
ALP SERPL-CCNC: 57 U/L (ref 39–117)
ALT SERPL W P-5'-P-CCNC: 21 U/L (ref 1–41)
ANION GAP SERPL CALCULATED.3IONS-SCNC: 8.4 MMOL/L (ref 5–15)
AST SERPL-CCNC: 26 U/L (ref 1–40)
BASOPHILS # BLD AUTO: 0.03 10*3/MM3 (ref 0–0.2)
BASOPHILS NFR BLD AUTO: 1.1 % (ref 0–1.5)
BILIRUB SERPL-MCNC: 0.3 MG/DL (ref 0–1.2)
BUN SERPL-MCNC: 19 MG/DL (ref 6–20)
BUN/CREAT SERPL: 19 (ref 7–25)
CALCIUM SPEC-SCNC: 9.3 MG/DL (ref 8.6–10.5)
CHLORIDE SERPL-SCNC: 104 MMOL/L (ref 98–107)
CO2 SERPL-SCNC: 27.6 MMOL/L (ref 22–29)
CREAT SERPL-MCNC: 1 MG/DL (ref 0.76–1.27)
DEPRECATED RDW RBC AUTO: 52.5 FL (ref 37–54)
EGFRCR SERPLBLD CKD-EPI 2021: 94.6 ML/MIN/1.73
EOSINOPHIL # BLD AUTO: 0.22 10*3/MM3 (ref 0–0.4)
EOSINOPHIL NFR BLD AUTO: 7.9 % (ref 0.3–6.2)
ERYTHROCYTE [DISTWIDTH] IN BLOOD BY AUTOMATED COUNT: 16.7 % (ref 12.3–15.4)
GLOBULIN UR ELPH-MCNC: 2.5 GM/DL
GLUCOSE SERPL-MCNC: 97 MG/DL (ref 65–99)
HCT VFR BLD AUTO: 39.5 % (ref 37.5–51)
HGB BLD-MCNC: 12.7 G/DL (ref 13–17.7)
IMM GRANULOCYTES # BLD AUTO: 0.01 10*3/MM3 (ref 0–0.05)
IMM GRANULOCYTES NFR BLD AUTO: 0.4 % (ref 0–0.5)
LYMPHOCYTES # BLD AUTO: 0.57 10*3/MM3 (ref 0.7–3.1)
LYMPHOCYTES NFR BLD AUTO: 20.5 % (ref 19.6–45.3)
MCH RBC QN AUTO: 27.7 PG (ref 26.6–33)
MCHC RBC AUTO-ENTMCNC: 32.2 G/DL (ref 31.5–35.7)
MCV RBC AUTO: 86.2 FL (ref 79–97)
MONOCYTES # BLD AUTO: 0.56 10*3/MM3 (ref 0.1–0.9)
MONOCYTES NFR BLD AUTO: 20.1 % (ref 5–12)
NEUTROPHILS NFR BLD AUTO: 1.39 10*3/MM3 (ref 1.7–7)
NEUTROPHILS NFR BLD AUTO: 50 % (ref 42.7–76)
NRBC BLD AUTO-RTO: 0 /100 WBC (ref 0–0.2)
PLAT MORPH BLD: NORMAL
PLATELET # BLD AUTO: 217 10*3/MM3 (ref 140–450)
PMV BLD AUTO: 10.7 FL (ref 6–12)
POTASSIUM SERPL-SCNC: 4.4 MMOL/L (ref 3.5–5.2)
PROT SERPL-MCNC: 6.6 G/DL (ref 6–8.5)
RBC # BLD AUTO: 4.58 10*6/MM3 (ref 4.14–5.8)
RBC MORPH BLD: NORMAL
SODIUM SERPL-SCNC: 140 MMOL/L (ref 136–145)
WBC MORPH BLD: NORMAL
WBC NRBC COR # BLD: 2.78 10*3/MM3 (ref 3.4–10.8)

## 2023-03-07 PROCEDURE — 96413 CHEMO IV INFUSION 1 HR: CPT

## 2023-03-07 PROCEDURE — 25010000002 DACARBAZINE PER 200 MG: Performed by: NURSE PRACTITIONER

## 2023-03-07 PROCEDURE — 25010000002 DOXORUBICIN PER 10 MG: Performed by: NURSE PRACTITIONER

## 2023-03-07 PROCEDURE — 96375 TX/PRO/DX INJ NEW DRUG ADDON: CPT

## 2023-03-07 PROCEDURE — 96367 TX/PROPH/DG ADDL SEQ IV INF: CPT

## 2023-03-07 PROCEDURE — 36415 COLL VENOUS BLD VENIPUNCTURE: CPT

## 2023-03-07 PROCEDURE — 25010000002 FOSAPREPITANT PER 1 MG: Performed by: NURSE PRACTITIONER

## 2023-03-07 PROCEDURE — 25010000002 ALTEPLASE 2 MG RECONSTITUTED SOLUTION: Performed by: NURSE PRACTITIONER

## 2023-03-07 PROCEDURE — 96366 THER/PROPH/DIAG IV INF ADDON: CPT

## 2023-03-07 PROCEDURE — 36593 DECLOT VASCULAR DEVICE: CPT

## 2023-03-07 PROCEDURE — 25010000002 HEPARIN LOCK FLUSH PER 10 UNITS: Performed by: INTERNAL MEDICINE

## 2023-03-07 PROCEDURE — 25010000002 VINBLASTINE PER 1 MG: Performed by: NURSE PRACTITIONER

## 2023-03-07 PROCEDURE — 85007 BL SMEAR W/DIFF WBC COUNT: CPT

## 2023-03-07 PROCEDURE — 99214 OFFICE O/P EST MOD 30 MIN: CPT | Performed by: NURSE PRACTITIONER

## 2023-03-07 PROCEDURE — 25010000002 DEXAMETHASONE SODIUM PHOSPHATE 20 MG/5ML SOLUTION: Performed by: NURSE PRACTITIONER

## 2023-03-07 PROCEDURE — 96411 CHEMO IV PUSH ADDL DRUG: CPT

## 2023-03-07 PROCEDURE — 85025 COMPLETE CBC W/AUTO DIFF WBC: CPT

## 2023-03-07 PROCEDURE — 25010000002 PALONOSETRON 0.25 MG/5ML SOLUTION PREFILLED SYRINGE: Performed by: NURSE PRACTITIONER

## 2023-03-07 PROCEDURE — 80053 COMPREHEN METABOLIC PANEL: CPT

## 2023-03-07 RX ORDER — OLANZAPINE 5 MG/1
5 TABLET ORAL ONCE
Status: CANCELLED | OUTPATIENT
Start: 2023-03-21 | End: 2023-03-21

## 2023-03-07 RX ORDER — PALONOSETRON 0.05 MG/ML
0.25 INJECTION, SOLUTION INTRAVENOUS ONCE
Status: CANCELLED | OUTPATIENT
Start: 2023-03-07

## 2023-03-07 RX ORDER — DOXORUBICIN HYDROCHLORIDE 2 MG/ML
25 INJECTION, SOLUTION INTRAVENOUS ONCE
Status: CANCELLED | OUTPATIENT
Start: 2023-03-21

## 2023-03-07 RX ORDER — PALONOSETRON 0.05 MG/ML
0.25 INJECTION, SOLUTION INTRAVENOUS ONCE
Status: CANCELLED | OUTPATIENT
Start: 2023-03-21

## 2023-03-07 RX ORDER — HEPARIN SODIUM (PORCINE) LOCK FLUSH IV SOLN 100 UNIT/ML 100 UNIT/ML
500 SOLUTION INTRAVENOUS AS NEEDED
Status: CANCELLED | OUTPATIENT
Start: 2023-03-07

## 2023-03-07 RX ORDER — OLANZAPINE 5 MG/1
5 TABLET ORAL ONCE
Status: DISCONTINUED | OUTPATIENT
Start: 2023-03-07 | End: 2023-03-07 | Stop reason: HOSPADM

## 2023-03-07 RX ORDER — OLANZAPINE 5 MG/1
5 TABLET ORAL ONCE
Status: CANCELLED | OUTPATIENT
Start: 2023-03-07 | End: 2023-03-07

## 2023-03-07 RX ORDER — HEPARIN SODIUM (PORCINE) LOCK FLUSH IV SOLN 100 UNIT/ML 100 UNIT/ML
500 SOLUTION INTRAVENOUS AS NEEDED
Status: DISPENSED | OUTPATIENT
Start: 2023-03-07

## 2023-03-07 RX ORDER — DOXORUBICIN HYDROCHLORIDE 2 MG/ML
25 INJECTION, SOLUTION INTRAVENOUS ONCE
Status: COMPLETED | OUTPATIENT
Start: 2023-03-07 | End: 2023-03-07

## 2023-03-07 RX ORDER — SODIUM CHLORIDE 9 MG/ML
250 INJECTION, SOLUTION INTRAVENOUS ONCE
Status: DISCONTINUED | OUTPATIENT
Start: 2023-03-07 | End: 2023-03-07 | Stop reason: HOSPADM

## 2023-03-07 RX ORDER — SODIUM CHLORIDE 9 MG/ML
250 INJECTION, SOLUTION INTRAVENOUS ONCE
Status: CANCELLED | OUTPATIENT
Start: 2023-03-07

## 2023-03-07 RX ORDER — DOXORUBICIN HYDROCHLORIDE 2 MG/ML
25 INJECTION, SOLUTION INTRAVENOUS ONCE
Status: CANCELLED | OUTPATIENT
Start: 2023-03-07

## 2023-03-07 RX ORDER — SODIUM CHLORIDE 9 MG/ML
250 INJECTION, SOLUTION INTRAVENOUS ONCE
Status: CANCELLED | OUTPATIENT
Start: 2023-03-21

## 2023-03-07 RX ORDER — PALONOSETRON 0.05 MG/ML
0.25 INJECTION, SOLUTION INTRAVENOUS ONCE
Status: COMPLETED | OUTPATIENT
Start: 2023-03-07 | End: 2023-03-07

## 2023-03-07 RX ADMIN — PALONOSETRON 0.25 MG: 0.25 INJECTION, SOLUTION INTRAVENOUS at 11:43

## 2023-03-07 RX ADMIN — Medication 500 UNITS: at 13:22

## 2023-03-07 RX ADMIN — SODIUM CHLORIDE 150 MG: 9 INJECTION, SOLUTION INTRAVENOUS at 11:55

## 2023-03-07 RX ADMIN — ALTEPLASE: 2.2 INJECTION, POWDER, LYOPHILIZED, FOR SOLUTION INTRAVENOUS at 10:26

## 2023-03-07 RX ADMIN — DOXORUBICIN HYDROCHLORIDE 54 MG: 2 INJECTION, SOLUTION INTRAVENOUS at 12:13

## 2023-03-07 RX ADMIN — DEXAMETHASONE SODIUM PHOSPHATE 12 MG: 4 INJECTION, SOLUTION INTRAMUSCULAR; INTRAVENOUS at 11:55

## 2023-03-07 RX ADMIN — DACARBAZINE 820 MG: 10 INJECTION, POWDER, FOR SOLUTION INTRAVENOUS at 12:46

## 2023-03-07 RX ADMIN — VINBLASTINE SULFATE 13 MG: 1 INJECTION INTRAVENOUS at 12:25

## 2023-03-21 ENCOUNTER — INFUSION (OUTPATIENT)
Dept: ONCOLOGY | Facility: HOSPITAL | Age: 48
End: 2023-03-21
Payer: COMMERCIAL

## 2023-03-21 VITALS
DIASTOLIC BLOOD PRESSURE: 85 MMHG | HEART RATE: 73 BPM | TEMPERATURE: 98.4 F | BODY MASS INDEX: 28.26 KG/M2 | WEIGHT: 220.2 LBS | SYSTOLIC BLOOD PRESSURE: 142 MMHG

## 2023-03-21 DIAGNOSIS — C81.02 NODULAR LYMPHOCYTE PREDOMINANT HODGKIN LYMPHOMA OF INTRATHORACIC LYMPH NODES: Primary | ICD-10-CM

## 2023-03-21 DIAGNOSIS — C81.92 HODGKIN LYMPHOMA OF INTRATHORACIC LYMPH NODES, UNSPECIFIED HODGKIN LYMPHOMA TYPE: ICD-10-CM

## 2023-03-21 LAB
ALBUMIN SERPL-MCNC: 4.1 G/DL (ref 3.5–5.2)
ALBUMIN/GLOB SERPL: 1.6 G/DL
ALP SERPL-CCNC: 59 U/L (ref 39–117)
ALT SERPL W P-5'-P-CCNC: 22 U/L (ref 1–41)
ANION GAP SERPL CALCULATED.3IONS-SCNC: 9 MMOL/L (ref 5–15)
AST SERPL-CCNC: 22 U/L (ref 1–40)
BASOPHILS # BLD AUTO: 0.03 10*3/MM3 (ref 0–0.2)
BASOPHILS NFR BLD AUTO: 1.1 % (ref 0–1.5)
BILIRUB SERPL-MCNC: 0.2 MG/DL (ref 0–1.2)
BUN SERPL-MCNC: 17 MG/DL (ref 6–20)
BUN/CREAT SERPL: 19.3 (ref 7–25)
CALCIUM SPEC-SCNC: 9.3 MG/DL (ref 8.6–10.5)
CHLORIDE SERPL-SCNC: 103 MMOL/L (ref 98–107)
CO2 SERPL-SCNC: 27 MMOL/L (ref 22–29)
CREAT SERPL-MCNC: 0.88 MG/DL (ref 0.76–1.27)
DEPRECATED RDW RBC AUTO: 51.8 FL (ref 37–54)
EGFRCR SERPLBLD CKD-EPI 2021: 108.1 ML/MIN/1.73
EOSINOPHIL # BLD AUTO: 0.22 10*3/MM3 (ref 0–0.4)
EOSINOPHIL NFR BLD AUTO: 7.8 % (ref 0.3–6.2)
ERYTHROCYTE [DISTWIDTH] IN BLOOD BY AUTOMATED COUNT: 16.5 % (ref 12.3–15.4)
GLOBULIN UR ELPH-MCNC: 2.5 GM/DL
GLUCOSE SERPL-MCNC: 98 MG/DL (ref 65–99)
HCT VFR BLD AUTO: 40 % (ref 37.5–51)
HGB BLD-MCNC: 13.2 G/DL (ref 13–17.7)
IMM GRANULOCYTES # BLD AUTO: 0.02 10*3/MM3 (ref 0–0.05)
IMM GRANULOCYTES NFR BLD AUTO: 0.7 % (ref 0–0.5)
LYMPHOCYTES # BLD AUTO: 0.6 10*3/MM3 (ref 0.7–3.1)
LYMPHOCYTES NFR BLD AUTO: 21.2 % (ref 19.6–45.3)
MCH RBC QN AUTO: 28.4 PG (ref 26.6–33)
MCHC RBC AUTO-ENTMCNC: 33 G/DL (ref 31.5–35.7)
MCV RBC AUTO: 86 FL (ref 79–97)
MONOCYTES # BLD AUTO: 0.54 10*3/MM3 (ref 0.1–0.9)
MONOCYTES NFR BLD AUTO: 19.1 % (ref 5–12)
NEUTROPHILS NFR BLD AUTO: 1.42 10*3/MM3 (ref 1.7–7)
NEUTROPHILS NFR BLD AUTO: 50.1 % (ref 42.7–76)
NRBC BLD AUTO-RTO: 0 /100 WBC (ref 0–0.2)
PLATELET # BLD AUTO: 219 10*3/MM3 (ref 140–450)
PMV BLD AUTO: 10.9 FL (ref 6–12)
POTASSIUM SERPL-SCNC: 4.2 MMOL/L (ref 3.5–5.2)
PROT SERPL-MCNC: 6.6 G/DL (ref 6–8.5)
RBC # BLD AUTO: 4.65 10*6/MM3 (ref 4.14–5.8)
SODIUM SERPL-SCNC: 139 MMOL/L (ref 136–145)
WBC NRBC COR # BLD: 2.83 10*3/MM3 (ref 3.4–10.8)

## 2023-03-21 PROCEDURE — 96409 CHEMO IV PUSH SNGL DRUG: CPT

## 2023-03-21 PROCEDURE — 25010000002 DACARBAZINE PER 200 MG: Performed by: NURSE PRACTITIONER

## 2023-03-21 PROCEDURE — 96366 THER/PROPH/DIAG IV INF ADDON: CPT

## 2023-03-21 PROCEDURE — 96413 CHEMO IV INFUSION 1 HR: CPT

## 2023-03-21 PROCEDURE — 25010000002 VINBLASTINE PER 1 MG: Performed by: NURSE PRACTITIONER

## 2023-03-21 PROCEDURE — 25010000002 PALONOSETRON 0.25 MG/5ML SOLUTION PREFILLED SYRINGE: Performed by: NURSE PRACTITIONER

## 2023-03-21 PROCEDURE — 80053 COMPREHEN METABOLIC PANEL: CPT

## 2023-03-21 PROCEDURE — 25010000002 FOSAPREPITANT PER 1 MG: Performed by: NURSE PRACTITIONER

## 2023-03-21 PROCEDURE — 96367 TX/PROPH/DG ADDL SEQ IV INF: CPT

## 2023-03-21 PROCEDURE — 25010000002 HEPARIN LOCK FLUSH PER 10 UNITS: Performed by: INTERNAL MEDICINE

## 2023-03-21 PROCEDURE — 25010000002 DOXORUBICIN PER 10 MG: Performed by: NURSE PRACTITIONER

## 2023-03-21 PROCEDURE — 85025 COMPLETE CBC W/AUTO DIFF WBC: CPT

## 2023-03-21 PROCEDURE — 96411 CHEMO IV PUSH ADDL DRUG: CPT

## 2023-03-21 PROCEDURE — 96375 TX/PRO/DX INJ NEW DRUG ADDON: CPT

## 2023-03-21 PROCEDURE — 25010000002 DEXAMETHASONE SODIUM PHOSPHATE 20 MG/5ML SOLUTION: Performed by: NURSE PRACTITIONER

## 2023-03-21 RX ORDER — PALONOSETRON 0.05 MG/ML
0.25 INJECTION, SOLUTION INTRAVENOUS ONCE
Status: COMPLETED | OUTPATIENT
Start: 2023-03-21 | End: 2023-03-21

## 2023-03-21 RX ORDER — DOXORUBICIN HYDROCHLORIDE 2 MG/ML
25 INJECTION, SOLUTION INTRAVENOUS ONCE
Status: COMPLETED | OUTPATIENT
Start: 2023-03-21 | End: 2023-03-21

## 2023-03-21 RX ORDER — SODIUM CHLORIDE 9 MG/ML
250 INJECTION, SOLUTION INTRAVENOUS ONCE
Status: DISCONTINUED | OUTPATIENT
Start: 2023-03-21 | End: 2023-03-21 | Stop reason: HOSPADM

## 2023-03-21 RX ORDER — OLANZAPINE 5 MG/1
5 TABLET ORAL ONCE
Status: DISCONTINUED | OUTPATIENT
Start: 2023-03-21 | End: 2023-03-21 | Stop reason: HOSPADM

## 2023-03-21 RX ORDER — HEPARIN SODIUM (PORCINE) LOCK FLUSH IV SOLN 100 UNIT/ML 100 UNIT/ML
500 SOLUTION INTRAVENOUS AS NEEDED
OUTPATIENT
Start: 2023-03-21

## 2023-03-21 RX ORDER — HEPARIN SODIUM (PORCINE) LOCK FLUSH IV SOLN 100 UNIT/ML 100 UNIT/ML
500 SOLUTION INTRAVENOUS AS NEEDED
Status: DISCONTINUED | OUTPATIENT
Start: 2023-03-21 | End: 2023-03-21 | Stop reason: HOSPADM

## 2023-03-21 RX ADMIN — Medication 500 UNITS: at 11:46

## 2023-03-21 RX ADMIN — DEXAMETHASONE SODIUM PHOSPHATE 12 MG: 4 INJECTION, SOLUTION INTRAMUSCULAR; INTRAVENOUS at 10:05

## 2023-03-21 RX ADMIN — DOXORUBICIN HYDROCHLORIDE 54 MG: 2 INJECTION, SOLUTION INTRAVENOUS at 10:46

## 2023-03-21 RX ADMIN — VINBLASTINE SULFATE 13 MG: 1 INJECTION INTRAVENOUS at 10:55

## 2023-03-21 RX ADMIN — PALONOSETRON 0.25 MG: 0.25 INJECTION, SOLUTION INTRAVENOUS at 10:04

## 2023-03-21 RX ADMIN — FOSAPREPITANT 150 MG: 150 INJECTION, POWDER, LYOPHILIZED, FOR SOLUTION INTRAVENOUS at 10:24

## 2023-03-21 RX ADMIN — DACARBAZINE 820 MG: 10 INJECTION, POWDER, FOR SOLUTION INTRAVENOUS at 11:08

## 2023-04-05 ENCOUNTER — LAB (OUTPATIENT)
Dept: LAB | Facility: HOSPITAL | Age: 48
End: 2023-04-05
Payer: COMMERCIAL

## 2023-04-05 ENCOUNTER — HOSPITAL ENCOUNTER (OUTPATIENT)
Dept: PET IMAGING | Facility: HOSPITAL | Age: 48
Discharge: HOME | End: 2023-04-05
Payer: COMMERCIAL

## 2023-04-05 DIAGNOSIS — C81.02 NODULAR LYMPHOCYTE PREDOMINANT HODGKIN LYMPHOMA OF INTRATHORACIC LYMPH NODES: ICD-10-CM

## 2023-04-05 LAB
ALBUMIN SERPL-MCNC: 4.3 G/DL (ref 3.5–5.2)
ALBUMIN/GLOB SERPL: 1.7 G/DL
ALP SERPL-CCNC: 56 U/L (ref 39–117)
ALT SERPL W P-5'-P-CCNC: 24 U/L (ref 1–41)
ANION GAP SERPL CALCULATED.3IONS-SCNC: 10 MMOL/L (ref 5–15)
AST SERPL-CCNC: 27 U/L (ref 1–40)
BASOPHILS # BLD MANUAL: 0.04 10*3/MM3 (ref 0–0.2)
BASOPHILS NFR BLD MANUAL: 2 % (ref 0–1.5)
BILIRUB SERPL-MCNC: <0.2 MG/DL (ref 0–1.2)
BUN SERPL-MCNC: 22 MG/DL (ref 6–20)
BUN/CREAT SERPL: 21.2 (ref 7–25)
CALCIUM SPEC-SCNC: 9.2 MG/DL (ref 8.6–10.5)
CHLORIDE SERPL-SCNC: 107 MMOL/L (ref 98–107)
CO2 SERPL-SCNC: 26 MMOL/L (ref 22–29)
CREAT SERPL-MCNC: 1.04 MG/DL (ref 0.76–1.27)
DEPRECATED RDW RBC AUTO: 51.6 FL (ref 37–54)
EGFRCR SERPLBLD CKD-EPI 2021: 90.2 ML/MIN/1.73
EOSINOPHIL # BLD MANUAL: 0.15 10*3/MM3 (ref 0–0.4)
EOSINOPHIL NFR BLD MANUAL: 7 % (ref 0.3–6.2)
ERYTHROCYTE [DISTWIDTH] IN BLOOD BY AUTOMATED COUNT: 15.9 % (ref 12.3–15.4)
GLOBULIN UR ELPH-MCNC: 2.5 GM/DL
GLUCOSE BLDC GLUCOMTR-MCNC: 93 MG/DL (ref 70–130)
GLUCOSE SERPL-MCNC: 85 MG/DL (ref 65–99)
HCT VFR BLD AUTO: 41.7 % (ref 37.5–51)
HGB BLD-MCNC: 13 G/DL (ref 13–17.7)
LDH SERPL-CCNC: 190 U/L (ref 135–225)
LYMPHOCYTES # BLD MANUAL: 0.62 10*3/MM3 (ref 0.7–3.1)
LYMPHOCYTES NFR BLD MANUAL: 29 % (ref 5–12)
MCH RBC QN AUTO: 27.7 PG (ref 26.6–33)
MCHC RBC AUTO-ENTMCNC: 31.2 G/DL (ref 31.5–35.7)
MCV RBC AUTO: 88.9 FL (ref 79–97)
MONOCYTES # BLD: 0.64 10*3/MM3 (ref 0.1–0.9)
NEUTROPHILS # BLD AUTO: 0.75 10*3/MM3 (ref 1.7–7)
NEUTROPHILS NFR BLD MANUAL: 33 % (ref 42.7–76)
NEUTS BAND NFR BLD MANUAL: 1 % (ref 0–5)
PLAT MORPH BLD: NORMAL
PLATELET # BLD AUTO: 217 10*3/MM3 (ref 140–450)
PMV BLD AUTO: 11.7 FL (ref 6–12)
POTASSIUM SERPL-SCNC: 4.7 MMOL/L (ref 3.5–5.2)
PROT SERPL-MCNC: 6.8 G/DL (ref 6–8.5)
RBC # BLD AUTO: 4.69 10*6/MM3 (ref 4.14–5.8)
RBC MORPH BLD: NORMAL
SODIUM SERPL-SCNC: 143 MMOL/L (ref 136–145)
VARIANT LYMPHS NFR BLD MANUAL: 28 % (ref 19.6–45.3)
WBC MORPH BLD: NORMAL
WBC NRBC COR # BLD: 2.2 10*3/MM3 (ref 3.4–10.8)

## 2023-04-05 PROCEDURE — 82962 GLUCOSE BLOOD TEST: CPT

## 2023-04-05 PROCEDURE — 78815 PET IMAGE W/CT SKULL-THIGH: CPT

## 2023-04-05 PROCEDURE — 85025 COMPLETE CBC W/AUTO DIFF WBC: CPT

## 2023-04-05 PROCEDURE — A9552 F18 FDG: HCPCS | Performed by: NURSE PRACTITIONER

## 2023-04-05 PROCEDURE — 80053 COMPREHEN METABOLIC PANEL: CPT

## 2023-04-05 PROCEDURE — 0 FLUDEOXYGLUCOSE F18 SOLUTION: Performed by: NURSE PRACTITIONER

## 2023-04-05 PROCEDURE — 83615 LACTATE (LD) (LDH) ENZYME: CPT

## 2023-04-05 PROCEDURE — 36415 COLL VENOUS BLD VENIPUNCTURE: CPT

## 2023-04-05 PROCEDURE — 85007 BL SMEAR W/DIFF WBC COUNT: CPT

## 2023-04-05 RX ADMIN — FLUDEOXYGLUCOSE F18 1 DOSE: 300 INJECTION INTRAVENOUS at 08:13

## 2023-04-07 ENCOUNTER — TELEPHONE (OUTPATIENT)
Dept: ONCOLOGY | Facility: CLINIC | Age: 48
End: 2023-04-07
Payer: COMMERCIAL

## 2023-04-07 NOTE — TELEPHONE ENCOUNTER
I called and told Jeannette I could not release or talk about PET results until the doctor has the appt with patient on April 11.  She verbalized understanding.

## 2023-04-07 NOTE — TELEPHONE ENCOUNTER
Caller: JOSÉ    Relationship: [employer] ([employer])    Best call back number:[phone]  FAX: [phone]    What is the best time to reach you: UNTIL 4PM    Who are you requesting to speak with (clinical staff, provider,  specific staff member): CLINICAL    What was the call regarding: UPDATE ON PET SCAN    Do you require a callback: YES

## 2023-04-11 ENCOUNTER — OFFICE VISIT (OUTPATIENT)
Dept: ONCOLOGY | Facility: CLINIC | Age: 48
End: 2023-04-11
Payer: COMMERCIAL

## 2023-04-11 VITALS
HEART RATE: 66 BPM | BODY MASS INDEX: 28.88 KG/M2 | RESPIRATION RATE: 12 BRPM | WEIGHT: 225 LBS | DIASTOLIC BLOOD PRESSURE: 76 MMHG | OXYGEN SATURATION: 99 % | SYSTOLIC BLOOD PRESSURE: 130 MMHG | HEIGHT: 74 IN | TEMPERATURE: 98 F

## 2023-04-11 DIAGNOSIS — C81.12 NODULAR SCLEROSIS HODGKIN LYMPHOMA OF INTRATHORACIC LYMPH NODES: Primary | ICD-10-CM

## 2023-04-11 PROCEDURE — 99214 OFFICE O/P EST MOD 30 MIN: CPT | Performed by: INTERNAL MEDICINE

## 2023-04-11 NOTE — PROGRESS NOTES
"      PROBLEM LIST:  1.  Hodgkin lymphoma, stage IIB  A) bone marrow biopsy done for chronic anemia 8/12/2022 showed a normocellular bone marrow with maturing trilineage hematopoiesis, increased stainable marrow iron, no evidence of dysplasia, fibrosis, or increased blasts.  Labs showed no evidence of nutrient deficiency, or heavy metal toxicity.  B) CT chest abdomen pelvis on 9/7/2022 showed extensive intrathoracic and supraclavicular lymphadenopathy, largest superior mediastinal lymph node 3.8 cm, left supraclavicular lymph node 2.8 cm, right supraclavicular lymph node 2.7 cm.  Hypodense lesions in the liver most likely benign cysts.  C) supraclavicular lymph node biopsy on 9/30/2022 showed nodular sclerosing Hodgkin lymphoma.  PET/CT on 10/10/2022 showed enlarged and hypermetabolic mediastinal lymphadenopathy, contiguous to lower cervical regions.  No other sites of disease.  D) treatment with ABVD started 10/18/2022.  PET after 2 cycles showed resolution of hypermetasolic activity.  Completed an additional 4 cycles of AVD.  2. Weight loss    Subjective     CHIEF COMPLAINT: anemia    HISTORY OF PRESENT ILLNESS:   William Maria returns for follow-up.  He is feeling well.  He has been off of chemo now for 3 weeks and his energy has really improved.  He is back at a weight that he would like to stay at.  Overall he feels very grateful and blessed.      Objective      /76   Pulse 66   Temp 98 °F (36.7 °C) (Temporal)   Resp 12   Ht 188 cm (74\")   Wt 102 kg (225 lb)   SpO2 99%   BMI 28.89 kg/m²      Performance Status:0              General: well appearing male in no acute distress  Neuro: alert and oriented  HEENT: sclera anicteric, oropharynx clear  Lymphatics: no cervical, supraclavicular, or axillary adenopathy  Skin: no rashes, lesions, bruising, or petechiae  Psych: mood and affect appropriate          RECENT LABS:    Lab Results   Component Value Date    WBC 2.20 (L) 04/05/2023    HGB 13.0 " 04/05/2023    HCT 41.7 04/05/2023    MCV 88.9 04/05/2023     04/05/2023     Lab Results   Component Value Date    GLUCOSE 85 04/05/2023    BUN 22 (H) 04/05/2023    CREATININE 1.04 04/05/2023    BCR 21.2 04/05/2023    K 4.7 04/05/2023    CO2 26.0 04/05/2023    CALCIUM 9.2 04/05/2023    PROTENTOTREF 7.1 07/12/2022    ALBUMIN 4.3 04/05/2023    LABIL2 0.8 07/12/2022    AST 27 04/05/2023    ALT 24 04/05/2023       NM PET/CT Skull Base to Mid Thigh  Narrative: NM PET/CT SKULL BASE TO MID THIGH    Date of Exam: 4/5/2023 8:00 AM EDT    Indication: Follow up Hodgkin's lymphoma.    Comparison: 12/8/2022    Technique:  Whole body PET/CT imaging was performed with positron emission tomography (PET with concurrently acquired computed tomography (CT) for attenuation correction and anatomical localization); field of view imaged was the skull base to midthigh.    Fasting Blood glucose level: 93 mg/dl.  FDG dosage: 11.73 mCi F-18 Intravenous administration. Images were obtained after 60 minutes of equilibrium.  SUV values were normalized using body weight.    Reference uptake values:  Mediastinum: 2.2 SUVmax  Liver: 2.5 SUVmax    Findings:  HEAD AND NECK: Physiologic hypermetabolism of the aerodigestive tract structures is present, without hypermetabolic cervical adenopathy or aerodigestive tract mass. Normal hypermetabolism of the cerebral hemispheres.    CHEST: There is physiologic hypermetabolism of the left ventricular myocardium. There is otherwise no hypermetabolic adenopathy or pulmonary nodularity. Previously noted and persistently enlarged mediastinal lymph nodes are present, for example with AP   window and upper mediastinal lymph nodes present measuring up to 13 mm short axis, without new suspicious PET activity.    ABDOMEN AND PELVIS: There is physiologic activity of the gastrointestinal and genitourinary tracts, without focal hypermetabolism concerning for acute or neoplastic process. Osseous structures are  unremarkable diffusely.  Impression: Impression:  Stable negative PET CT. There are persistent enlarged mediastinal lymph nodes present, unchanged in size and remaining negative on PET, without new suspicious hypermetabolism.    Electronically Signed: Nilay Loza    4/5/2023 12:13 PM EDT    Workstation ID: [id number]    Results for orders placed during the hospital encounter of 02/02/23    Adult Transthoracic Echo Complete W/ Cont if Necessary Per Protocol    Interpretation Summary  •  Left ventricular systolic function is normal. Estimated left ventricular EF = 55%  •  Left ventricular diastolic function was normal.  •  The cardiac valves are anatomically and functionally normal.  •  Estimated right ventricular systolic pressure from tricuspid regurgitation is normal (<35 mmHg).  •  Normal global longitudinal strain pattern (18.6)      I personally reviewed the imaging studies        ASSESSMENT AND PLAN:     William Maria is a 47 y.o. male with stage IIb Hodgkin lymphoma, here for follow-up.    Hodgkin lymphoma: He has completed 6 cycles of chemotherapy with complete response by PET scan.  We discussed that we will do labs every 3 months and CT scans every 6 months for the first 2 years, and then continued office visits after that out to at least 5 years.    Will refer to Dr. Alonzo for port removal.    Follow-up in 3 months.        Total time of patient care including time prior to, face to face with patient, and following visit spent in reviewing records, lab results, imaging studies, discussion with patient, and documentation/charting was > 33 minutes                    Tamie Mccullough MD  [facility] Hematology and Oncology    4/11/2023          CC:

## 2023-04-14 NOTE — PROGRESS NOTES
"Subjective   William Maria is a 47 y.o. male.   Chief Complaint   Patient presents with   • Procedure     Port removal       History of Present Illness   Patient is in the office today for port removal.  The following portions of the patient's history were reviewed and updated as appropriate: allergies, current medications, past family history, past medical history, past social history, past surgical history and problem list.    Review of Systems   Constitutional: Negative for chills, fever and unexpected weight change.   HENT: Negative for trouble swallowing and voice change.    Eyes: Negative for visual disturbance.   Respiratory: Negative for apnea, cough, chest tightness, shortness of breath and wheezing.    Cardiovascular: Negative for chest pain, palpitations and leg swelling.   Gastrointestinal: Negative for abdominal distention, abdominal pain, anal bleeding, blood in stool, constipation, diarrhea, nausea, rectal pain and vomiting.   Endocrine: Negative for cold intolerance and heat intolerance.   Genitourinary: Negative for difficulty urinating, dysuria, flank pain, scrotal swelling and testicular pain.   Musculoskeletal: Negative for back pain, gait problem and joint swelling.   Skin: Negative for color change, rash and wound.   Neurological: Negative for dizziness, syncope, speech difficulty, weakness, numbness and headaches.   Hematological: Negative for adenopathy. Does not bruise/bleed easily.   Psychiatric/Behavioral: Negative for confusion. The patient is not nervous/anxious.        Objective    /70   Pulse 76   Temp 97.7 °F (36.5 °C) (Temporal)   Ht 188 cm (74\")   Wt 102 kg (224 lb 9.6 oz)   SpO2 99%   BMI 28.84 kg/m²     Physical Exam    Assessment & Plan   Diagnoses and all orders for this visit:    1. Port-A-Cath in place (Primary)        "

## 2023-04-20 ENCOUNTER — PROCEDURE VISIT (OUTPATIENT)
Dept: SURGERY | Facility: CLINIC | Age: 48
End: 2023-04-20
Payer: COMMERCIAL

## 2023-04-20 VITALS
TEMPERATURE: 97.7 F | HEART RATE: 76 BPM | BODY MASS INDEX: 28.83 KG/M2 | HEIGHT: 74 IN | SYSTOLIC BLOOD PRESSURE: 100 MMHG | DIASTOLIC BLOOD PRESSURE: 70 MMHG | WEIGHT: 224.6 LBS | OXYGEN SATURATION: 99 %

## 2023-04-20 DIAGNOSIS — Z95.828 PORT-A-CATH IN PLACE: Primary | ICD-10-CM

## 2023-04-20 RX ORDER — DIPHENOXYLATE HYDROCHLORIDE AND ATROPINE SULFATE 2.5; .025 MG/1; MG/1
TABLET ORAL DAILY
COMMUNITY

## 2023-05-12 ENCOUNTER — TELEPHONE (OUTPATIENT)
Dept: ONCOLOGY | Facility: CLINIC | Age: 48
End: 2023-05-12
Payer: COMMERCIAL

## 2023-05-12 NOTE — TELEPHONE ENCOUNTER
Caller: JOSÉ    Relationship: NURSE    [facility]    Best call back number: [phone]     What is the best time to reach you: ANYTIME    Who are you requesting to speak with (clinical staff, provider,  specific staff member): NON-CLINICAL    What was the call regarding: JOSÉ ALMARAZ REQUESTING THE LAST OFFICE NOTE ON PT  [phone]    Do you require a callback: IF QUESTIONS

## 2023-10-06 ENCOUNTER — HOSPITAL ENCOUNTER (OUTPATIENT)
Dept: CT IMAGING | Facility: HOSPITAL | Age: 48
Discharge: HOME | End: 2023-10-06
Payer: COMMERCIAL

## 2023-10-06 DIAGNOSIS — C81.12 NODULAR SCLEROSIS HODGKIN LYMPHOMA OF INTRATHORACIC LYMPH NODES: ICD-10-CM

## 2023-10-06 PROCEDURE — 74177 CT ABD & PELVIS W/CONTRAST: CPT

## 2023-10-06 PROCEDURE — 25510000001 IOPAMIDOL 61 % SOLUTION: Performed by: INTERNAL MEDICINE

## 2023-10-06 PROCEDURE — 71260 CT THORAX DX C+: CPT

## 2023-10-06 PROCEDURE — 70491 CT SOFT TISSUE NECK W/DYE: CPT

## 2023-10-06 RX ADMIN — IOPAMIDOL 50 ML: 612 INJECTION, SOLUTION INTRAVENOUS at 08:30

## 2023-10-06 RX ADMIN — IOPAMIDOL 100 ML: 612 INJECTION, SOLUTION INTRAVENOUS at 08:30

## 2023-10-10 ENCOUNTER — OFFICE VISIT (OUTPATIENT)
Dept: ONCOLOGY | Facility: CLINIC | Age: 48
End: 2023-10-10
Payer: COMMERCIAL

## 2023-10-10 ENCOUNTER — TELEPHONE (OUTPATIENT)
Dept: ONCOLOGY | Facility: CLINIC | Age: 48
End: 2023-10-10
Payer: COMMERCIAL

## 2023-10-10 ENCOUNTER — LAB (OUTPATIENT)
Dept: LAB | Facility: HOSPITAL | Age: 48
End: 2023-10-10
Payer: COMMERCIAL

## 2023-10-10 VITALS
WEIGHT: 232 LBS | TEMPERATURE: 98.2 F | HEIGHT: 74 IN | BODY MASS INDEX: 29.77 KG/M2 | SYSTOLIC BLOOD PRESSURE: 127 MMHG | DIASTOLIC BLOOD PRESSURE: 80 MMHG | RESPIRATION RATE: 16 BRPM | OXYGEN SATURATION: 99 % | HEART RATE: 61 BPM

## 2023-10-10 DIAGNOSIS — C81.12 NODULAR SCLEROSIS HODGKIN LYMPHOMA OF INTRATHORACIC LYMPH NODES: Primary | ICD-10-CM

## 2023-10-10 DIAGNOSIS — C81.12 NODULAR SCLEROSIS HODGKIN LYMPHOMA OF INTRATHORACIC LYMPH NODES: ICD-10-CM

## 2023-10-10 LAB
ALBUMIN SERPL-MCNC: 4.7 G/DL (ref 3.5–5.2)
ALBUMIN/GLOB SERPL: 1.7 G/DL
ALP SERPL-CCNC: 77 U/L (ref 39–117)
ALT SERPL W P-5'-P-CCNC: 24 U/L (ref 1–41)
ANION GAP SERPL CALCULATED.3IONS-SCNC: 10.6 MMOL/L (ref 5–15)
AST SERPL-CCNC: 31 U/L (ref 1–40)
BASOPHILS # BLD AUTO: 0.03 10*3/MM3 (ref 0–0.2)
BASOPHILS NFR BLD AUTO: 0.5 % (ref 0–1.5)
BILIRUB SERPL-MCNC: 0.4 MG/DL (ref 0–1.2)
BUN SERPL-MCNC: 18 MG/DL (ref 6–20)
BUN/CREAT SERPL: 16.1 (ref 7–25)
CALCIUM SPEC-SCNC: 9.8 MG/DL (ref 8.6–10.5)
CHLORIDE SERPL-SCNC: 103 MMOL/L (ref 98–107)
CO2 SERPL-SCNC: 26.4 MMOL/L (ref 22–29)
CREAT SERPL-MCNC: 1.12 MG/DL (ref 0.76–1.27)
DEPRECATED RDW RBC AUTO: 43.8 FL (ref 37–54)
EGFRCR SERPLBLD CKD-EPI 2021: 82.6 ML/MIN/1.73
EOSINOPHIL # BLD AUTO: 0.17 10*3/MM3 (ref 0–0.4)
EOSINOPHIL NFR BLD AUTO: 2.8 % (ref 0.3–6.2)
ERYTHROCYTE [DISTWIDTH] IN BLOOD BY AUTOMATED COUNT: 13.3 % (ref 12.3–15.4)
ERYTHROCYTE [SEDIMENTATION RATE] IN BLOOD: 3 MM/HR (ref 0–15)
GLOBULIN UR ELPH-MCNC: 2.7 GM/DL
GLUCOSE SERPL-MCNC: 95 MG/DL (ref 65–99)
HCT VFR BLD AUTO: 45.6 % (ref 37.5–51)
HGB BLD-MCNC: 14.6 G/DL (ref 13–17.7)
IMM GRANULOCYTES # BLD AUTO: 0.03 10*3/MM3 (ref 0–0.05)
IMM GRANULOCYTES NFR BLD AUTO: 0.5 % (ref 0–0.5)
LYMPHOCYTES # BLD AUTO: 1.19 10*3/MM3 (ref 0.7–3.1)
LYMPHOCYTES NFR BLD AUTO: 19.9 % (ref 19.6–45.3)
MCH RBC QN AUTO: 28.9 PG (ref 26.6–33)
MCHC RBC AUTO-ENTMCNC: 32 G/DL (ref 31.5–35.7)
MCV RBC AUTO: 90.1 FL (ref 79–97)
MONOCYTES # BLD AUTO: 0.5 10*3/MM3 (ref 0.1–0.9)
MONOCYTES NFR BLD AUTO: 8.4 % (ref 5–12)
NEUTROPHILS NFR BLD AUTO: 4.06 10*3/MM3 (ref 1.7–7)
NEUTROPHILS NFR BLD AUTO: 67.9 % (ref 42.7–76)
NRBC BLD AUTO-RTO: 0 /100 WBC (ref 0–0.2)
PLATELET # BLD AUTO: 183 10*3/MM3 (ref 140–450)
PMV BLD AUTO: 11.2 FL (ref 6–12)
POTASSIUM SERPL-SCNC: 4.7 MMOL/L (ref 3.5–5.2)
PROT SERPL-MCNC: 7.4 G/DL (ref 6–8.5)
RBC # BLD AUTO: 5.06 10*6/MM3 (ref 4.14–5.8)
SODIUM SERPL-SCNC: 140 MMOL/L (ref 136–145)
WBC NRBC COR # BLD: 5.98 10*3/MM3 (ref 3.4–10.8)

## 2023-10-10 PROCEDURE — 85652 RBC SED RATE AUTOMATED: CPT | Performed by: INTERNAL MEDICINE

## 2023-10-10 PROCEDURE — 85025 COMPLETE CBC W/AUTO DIFF WBC: CPT | Performed by: INTERNAL MEDICINE

## 2023-10-10 PROCEDURE — 36415 COLL VENOUS BLD VENIPUNCTURE: CPT | Performed by: INTERNAL MEDICINE

## 2023-10-10 PROCEDURE — 99213 OFFICE O/P EST LOW 20 MIN: CPT | Performed by: INTERNAL MEDICINE

## 2023-10-10 PROCEDURE — 80053 COMPREHEN METABOLIC PANEL: CPT | Performed by: INTERNAL MEDICINE

## 2023-10-10 NOTE — TELEPHONE ENCOUNTER
I called patient per Dr Mccullough to let him know that all his labs were normal.  I had to leave voicemail.

## 2023-10-10 NOTE — PROGRESS NOTES
"      PROBLEM LIST:  1.  Hodgkin lymphoma, stage IIB  A) bone marrow biopsy done for chronic anemia 8/12/2022 showed a normocellular bone marrow with maturing trilineage hematopoiesis, increased stainable marrow iron, no evidence of dysplasia, fibrosis, or increased blasts.  Labs showed no evidence of nutrient deficiency, or heavy metal toxicity.  B) CT chest abdomen pelvis on 9/7/2022 showed extensive intrathoracic and supraclavicular lymphadenopathy, largest superior mediastinal lymph node 3.8 cm, left supraclavicular lymph node 2.8 cm, right supraclavicular lymph node 2.7 cm.  Hypodense lesions in the liver most likely benign cysts.  C) supraclavicular lymph node biopsy on 9/30/2022 showed nodular sclerosing Hodgkin lymphoma.  PET/CT on 10/10/2022 showed enlarged and hypermetabolic mediastinal lymphadenopathy, contiguous to lower cervical regions.  No other sites of disease.  D) treatment with ABVD started 10/18/2022.  PET after 2 cycles showed resolution of hypermetasolic activity.  Completed an additional 4 cycles of AVD March 2023.  2. Weight loss    Subjective     CHIEF COMPLAINT: anemia    HISTORY OF PRESENT ILLNESS:   William Maria returns for follow-up.  He has been feeling well.  He recently completed a 10K race which was a good milestone for him.  Energy and appetite are good.      Objective      /80   Pulse 61   Temp 98.2 øF (36.8 øC) (Temporal)   Resp 16   Ht 188 cm (74\")   Wt 105 kg (232 lb)   SpO2 99%   BMI 29.79 kg/mý      Performance Status:0              General: well appearing male in no acute distress  Neuro: alert and oriented  HEENT: sclera anicteric, oropharynx clear  Lymphatics: no cervical, supraclavicular, or axillary adenopathy  Lungs: Clear to auscultation bilaterally  Vascular: Regular rate and rhythm  Skin: no rashes, lesions, bruising, or petechiae  Psych: mood and affect appropriate          RECENT LABS:    Lab Results   Component Value Date    WBC 5.23 07/11/2023 "    HGB 14.5 07/11/2023    HCT 45.5 07/11/2023    MCV 88.3 07/11/2023     07/11/2023     Lab Results   Component Value Date    GLUCOSE 91 07/11/2023    BUN 17 07/11/2023    CREATININE 1.03 07/11/2023    BCR 16.5 07/11/2023    K 4.6 07/11/2023    CO2 26.4 07/11/2023    CALCIUM 9.4 07/11/2023    PROTENTOTREF 7.1 07/12/2022    ALBUMIN 4.5 07/11/2023    LABIL2 0.8 07/12/2022    AST 23 07/11/2023    ALT 22 07/11/2023         CT Chest With Contrast Diagnostic  Narrative: PROCEDURE: CT CHEST W CONTRAST DIAGNOSTIC-     HISTORY: lymphoma; C81.12-Nodular sclerosis Hodgkin lymphoma,  intrathoracic lymph nodes     COMPARISON: PET/CT from April 5, 2023 and CT chest from September 7, 2022.     PROCEDURE: The patient was injected with IV contrast.  Axial images were  obtained from the lung apex to the mid abdomen by computed tomography.     FINDINGS:     CHEST: Significant reduction in size of mediastinal lymph nodes compared  to the September 2022 exam. PET/CT from April 5, 2023 demonstrated no  abnormal metabolic activity and significant reduction in size. On  today's exam there are prevascular nodes measuring up to 1.5 cm,  essentially stable. The heart is normal size. No axillary adenopathy.  Small hiatal hernia. Low-density lesions in the liver, likely benign  cyst. Gallbladder is present. No acute osseous changes.     Impression: Essentially stable mediastinal nodes, continued follow-up  recommended.     This study was performed with techniques to keep radiation doses as low  as reasonably achievable (ALARA). Individualized dose reduction  techniques using automated exposure control or adjustment of mA and/or  kV according to the patient size were employed.        CTDI: 6.65 mGy  DLP:731.22 mGy.cm        This report was signed and finalized on 10/8/2023 4:55 PM by Ian Vidal DO.     CT Abdomen Pelvis With Contrast  Narrative: PROCEDURE: CT ABDOMEN PELVIS W CONTRAST-     HISTORY: hodgkin lymphoma; C81.12-Nodular  sclerosis Hodgkin lymphoma,  intrathoracic lymph nodes     COMPARISON: September 7, 2022.     PROCEDURE: The patient was injected with IV contrast.  Axial images were  obtained from the lung bases to the pubic symphysis by computed  tomography.     FINDINGS:     ABDOMEN: Multiple low-density lesions again seen throughout the liver,  likely benign cysts. The gallbladder is present. Spleen is normal size.  No pancreatic mass or inflammatory change. Adrenal glands are  unremarkable. There is no mesenteric adenopathy. No hydronephrosis was  appreciated. There are no abnormally dilated loops of bowel. No evidence  of abdominal aortic aneurysm.     PELVIS: The appendix is normal. The bladder is decompressed. Prostate is  heterogeneous and slightly prominent measuring up to 4.7 cm, correlate  clinically. No acute osseous changes.     Impression: No adenopathy.     Prominent prostate, correlate clinically.           This study was performed with techniques to keep radiation doses as low  as reasonably achievable (ALARA). Individualized dose reduction  techniques using automated exposure control or adjustment of mA and/or  kV according to the patient size were employed.        CTDI:  DLP:        This report was signed and finalized on 10/8/2023 12:54 PM by Ian Vidal DO.           ASSESSMENT AND PLAN:     William Maria is a 47 y.o. male with stage IIb Hodgkin lymphoma, here for follow-up.    Hodgkin lymphoma: He has completed 6 cycles of chemotherapy with complete response by PET scan.  Recent scans are unremarkable.  We discussed that he may always have some mildly enlarged lymph nodes in the chest but as long as they are stable we will just monitor.  We will check labs today.  We will plan to do scans every 6 months for the first 2 years after completing treatment.    Follow-up in 3 months with labs.                          Tamie Mccullough MD  [facility] Hematology and Oncology    10/10/2023          CC:

## 2023-10-13 ENCOUNTER — TELEPHONE (OUTPATIENT)
Dept: ONCOLOGY | Facility: CLINIC | Age: 48
End: 2023-10-13
Payer: COMMERCIAL

## 2023-10-13 NOTE — TELEPHONE ENCOUNTER
Caller: JOSÉ    Relationship: [employer]    Best call back number: [phone]     What is the best time to reach you: ANY    Who are you requesting to speak with (clinical staff, provider,  specific staff member): CLINICAL    What was the call regarding: JOSÉ  IS CALLING WANTING AN UPDATE ON CT SCAN AND LAST OFFICE VISIT      PLEASE FAX TO       FAX# [phone]

## 2024-01-09 ENCOUNTER — OFFICE VISIT (OUTPATIENT)
Dept: ONCOLOGY | Facility: CLINIC | Age: 49
End: 2024-01-09
Payer: COMMERCIAL

## 2024-01-09 ENCOUNTER — LAB (OUTPATIENT)
Dept: LAB | Facility: HOSPITAL | Age: 49
End: 2024-01-09
Payer: COMMERCIAL

## 2024-01-09 VITALS
WEIGHT: 239 LBS | HEART RATE: 62 BPM | HEIGHT: 74 IN | RESPIRATION RATE: 12 BRPM | SYSTOLIC BLOOD PRESSURE: 129 MMHG | DIASTOLIC BLOOD PRESSURE: 87 MMHG | TEMPERATURE: 98.2 F | BODY MASS INDEX: 30.67 KG/M2 | OXYGEN SATURATION: 99 %

## 2024-01-09 DIAGNOSIS — C81.12 NODULAR SCLEROSIS HODGKIN LYMPHOMA OF INTRATHORACIC LYMPH NODES: ICD-10-CM

## 2024-01-09 DIAGNOSIS — C81.12 NODULAR SCLEROSIS HODGKIN LYMPHOMA OF INTRATHORACIC LYMPH NODES: Primary | ICD-10-CM

## 2024-01-09 LAB
ALBUMIN SERPL-MCNC: 4.8 G/DL (ref 3.5–5.2)
ALBUMIN/GLOB SERPL: 2 G/DL
ALP SERPL-CCNC: 81 U/L (ref 39–117)
ALT SERPL W P-5'-P-CCNC: 28 U/L (ref 1–41)
ANION GAP SERPL CALCULATED.3IONS-SCNC: 8.6 MMOL/L (ref 5–15)
AST SERPL-CCNC: 38 U/L (ref 1–40)
BASOPHILS # BLD AUTO: 0.04 10*3/MM3 (ref 0–0.2)
BASOPHILS NFR BLD AUTO: 0.7 % (ref 0–1.5)
BILIRUB SERPL-MCNC: 0.4 MG/DL (ref 0–1.2)
BUN SERPL-MCNC: 17 MG/DL (ref 6–20)
BUN/CREAT SERPL: 15.7 (ref 7–25)
CALCIUM SPEC-SCNC: 8.9 MG/DL (ref 8.6–10.5)
CHLORIDE SERPL-SCNC: 103 MMOL/L (ref 98–107)
CO2 SERPL-SCNC: 28.4 MMOL/L (ref 22–29)
CREAT SERPL-MCNC: 1.08 MG/DL (ref 0.76–1.27)
DEPRECATED RDW RBC AUTO: 43.5 FL (ref 37–54)
EGFRCR SERPLBLD CKD-EPI 2021: 85.7 ML/MIN/1.73
EOSINOPHIL # BLD AUTO: 0.15 10*3/MM3 (ref 0–0.4)
EOSINOPHIL NFR BLD AUTO: 2.6 % (ref 0.3–6.2)
ERYTHROCYTE [DISTWIDTH] IN BLOOD BY AUTOMATED COUNT: 13.1 % (ref 12.3–15.4)
GLOBULIN UR ELPH-MCNC: 2.4 GM/DL
GLUCOSE SERPL-MCNC: 95 MG/DL (ref 65–99)
HCT VFR BLD AUTO: 43.9 % (ref 37.5–51)
HGB BLD-MCNC: 14.4 G/DL (ref 13–17.7)
IMM GRANULOCYTES # BLD AUTO: 0.01 10*3/MM3 (ref 0–0.05)
IMM GRANULOCYTES NFR BLD AUTO: 0.2 % (ref 0–0.5)
LDH SERPL-CCNC: 191 U/L (ref 135–225)
LYMPHOCYTES # BLD AUTO: 1.12 10*3/MM3 (ref 0.7–3.1)
LYMPHOCYTES NFR BLD AUTO: 19.3 % (ref 19.6–45.3)
MCH RBC QN AUTO: 29.8 PG (ref 26.6–33)
MCHC RBC AUTO-ENTMCNC: 32.8 G/DL (ref 31.5–35.7)
MCV RBC AUTO: 90.7 FL (ref 79–97)
MONOCYTES # BLD AUTO: 0.54 10*3/MM3 (ref 0.1–0.9)
MONOCYTES NFR BLD AUTO: 9.3 % (ref 5–12)
NEUTROPHILS NFR BLD AUTO: 3.94 10*3/MM3 (ref 1.7–7)
NEUTROPHILS NFR BLD AUTO: 67.9 % (ref 42.7–76)
NRBC BLD AUTO-RTO: 0 /100 WBC (ref 0–0.2)
PLATELET # BLD AUTO: 174 10*3/MM3 (ref 140–450)
PMV BLD AUTO: 11.3 FL (ref 6–12)
POTASSIUM SERPL-SCNC: 4.7 MMOL/L (ref 3.5–5.2)
PROT SERPL-MCNC: 7.2 G/DL (ref 6–8.5)
RBC # BLD AUTO: 4.84 10*6/MM3 (ref 4.14–5.8)
SODIUM SERPL-SCNC: 140 MMOL/L (ref 136–145)
WBC NRBC COR # BLD AUTO: 5.8 10*3/MM3 (ref 3.4–10.8)

## 2024-01-09 PROCEDURE — 36415 COLL VENOUS BLD VENIPUNCTURE: CPT

## 2024-01-09 PROCEDURE — 83615 LACTATE (LD) (LDH) ENZYME: CPT

## 2024-01-09 PROCEDURE — 99213 OFFICE O/P EST LOW 20 MIN: CPT | Performed by: NURSE PRACTITIONER

## 2024-01-09 PROCEDURE — 80053 COMPREHEN METABOLIC PANEL: CPT

## 2024-01-09 PROCEDURE — 85025 COMPLETE CBC W/AUTO DIFF WBC: CPT

## 2024-01-09 NOTE — PROGRESS NOTES
"      PROBLEM LIST:  1.  Hodgkin lymphoma, stage IIB  A) bone marrow biopsy done for chronic anemia 8/12/2022 showed a normocellular bone marrow with maturing trilineage hematopoiesis, increased stainable marrow iron, no evidence of dysplasia, fibrosis, or increased blasts.  Labs showed no evidence of nutrient deficiency, or heavy metal toxicity.  B) CT chest abdomen pelvis on 9/7/2022 showed extensive intrathoracic and supraclavicular lymphadenopathy, largest superior mediastinal lymph node 3.8 cm, left supraclavicular lymph node 2.8 cm, right supraclavicular lymph node 2.7 cm.  Hypodense lesions in the liver most likely benign cysts.  C) supraclavicular lymph node biopsy on 9/30/2022 showed nodular sclerosing Hodgkin lymphoma.  PET/CT on 10/10/2022 showed enlarged and hypermetabolic mediastinal lymphadenopathy, contiguous to lower cervical regions.  No other sites of disease.  D) treatment with ABVD started 10/18/2022.  PET after 2 cycles showed resolution of hypermetasolic activity.  Completed an additional 4 cycles of AVD March 2023.  2. Weight loss    Subjective     CHIEF COMPLAINT: anemia    HISTORY OF PRESENT ILLNESS:   William Maria returns for follow-up.  Overall, he has been doing well.  He is training for a half marathon.  Energy and appetite are good.  He continues to stay active in his [employer] as well [facility]        Objective      /87   Pulse 62   Temp 98.2 °F (36.8 °C)   Resp 12   Ht 188 cm (74\")   Wt 108 kg (239 lb)   SpO2 99%   BMI 30.69 kg/m²      Performance Status:0              General: well appearing male in no acute distress  Neuro: alert and oriented  HEENT: sclera anicteric, oropharynx clear  Lymphatics: no cervical, supraclavicular, or axillary adenopathy  Cardiovascular: regular rate and rhythm, no murmurs  Lungs: clear to auscultation bilaterally  Abdomen: soft, nontender, nondistended.  No palpable organomegaly  Extremeties: no lower " extremity edema  Skin: no rashes, lesions, bruising, or petechiae  Psych: mood and affect appropriate          RECENT LABS:    Lab Results   Component Value Date    WBC 5.98 10/10/2023    HGB 14.6 10/10/2023    HCT 45.6 10/10/2023    MCV 90.1 10/10/2023     10/10/2023     Lab Results   Component Value Date    GLUCOSE 95 10/10/2023    BUN 18 10/10/2023    CREATININE 1.12 10/10/2023    BCR 16.1 10/10/2023    K 4.7 10/10/2023    CO2 26.4 10/10/2023    CALCIUM 9.8 10/10/2023    PROTENTOTREF 7.1 07/12/2022    ALBUMIN 4.7 10/10/2023    LABIL2 0.8 07/12/2022    AST 31 10/10/2023    ALT 24 10/10/2023         CT Chest With Contrast Diagnostic  Narrative: PROCEDURE: CT CHEST W CONTRAST DIAGNOSTIC-     HISTORY: lymphoma; C81.12-Nodular sclerosis Hodgkin lymphoma,  intrathoracic lymph nodes     COMPARISON: PET/CT from April 5, 2023 and CT chest from September 7, 2022.     PROCEDURE: The patient was injected with IV contrast.  Axial images were  obtained from the lung apex to the mid abdomen by computed tomography.     FINDINGS:     CHEST: Significant reduction in size of mediastinal lymph nodes compared  to the September 2022 exam. PET/CT from April 5, 2023 demonstrated no  abnormal metabolic activity and significant reduction in size. On  today's exam there are prevascular nodes measuring up to 1.5 cm,  essentially stable. The heart is normal size. No axillary adenopathy.  Small hiatal hernia. Low-density lesions in the liver, likely benign  cyst. Gallbladder is present. No acute osseous changes.     Impression: Essentially stable mediastinal nodes, continued follow-up  recommended.     This study was performed with techniques to keep radiation doses as low  as reasonably achievable (ALARA). Individualized dose reduction  techniques using automated exposure control or adjustment of mA and/or  kV according to the patient size were employed.        CTDI: 6.65 mGy  DLP:731.22 mGy.cm        This report was signed and  finalized on 10/8/2023 4:55 PM by Ian Vidal DO.     CT Abdomen Pelvis With Contrast  Narrative: PROCEDURE: CT ABDOMEN PELVIS W CONTRAST-     HISTORY: hodgkin lymphoma; C81.12-Nodular sclerosis Hodgkin lymphoma,  intrathoracic lymph nodes     COMPARISON: September 7, 2022.     PROCEDURE: The patient was injected with IV contrast.  Axial images were  obtained from the lung bases to the pubic symphysis by computed  tomography.     FINDINGS:     ABDOMEN: Multiple low-density lesions again seen throughout the liver,  likely benign cysts. The gallbladder is present. Spleen is normal size.  No pancreatic mass or inflammatory change. Adrenal glands are  unremarkable. There is no mesenteric adenopathy. No hydronephrosis was  appreciated. There are no abnormally dilated loops of bowel. No evidence  of abdominal aortic aneurysm.     PELVIS: The appendix is normal. The bladder is decompressed. Prostate is  heterogeneous and slightly prominent measuring up to 4.7 cm, correlate  clinically. No acute osseous changes.     Impression: No adenopathy.     Prominent prostate, correlate clinically.           This study was performed with techniques to keep radiation doses as low  as reasonably achievable (ALARA). Individualized dose reduction  techniques using automated exposure control or adjustment of mA and/or  kV according to the patient size were employed.        CTDI:  DLP:        This report was signed and finalized on 10/8/2023 12:54 PM by Ian Vidal DO.           ASSESSMENT AND PLAN:     William Maria is a 48 y.o. male with stage IIb Hodgkin lymphoma, here for follow-up.    Hodgkin lymphoma: He has completed 6 cycles of chemotherapy with complete response by PET scan.  No recent labs.  We will check labs today and I will call him with results.  We will continue with her current plan to do scans every 6 months for the first 2 years after completing treatment.  I will order scans prior to return.    Follow-up in 3  months with labs and scans.                            Ashanti Spicer APRN  [facility]    1/9/2024          CC:

## 2024-04-05 ENCOUNTER — HOSPITAL ENCOUNTER (OUTPATIENT)
Dept: CT IMAGING | Facility: HOSPITAL | Age: 49
Discharge: HOME | End: 2024-04-05
Payer: COMMERCIAL

## 2024-04-05 DIAGNOSIS — C81.12 NODULAR SCLEROSIS HODGKIN LYMPHOMA OF INTRATHORACIC LYMPH NODES: ICD-10-CM

## 2024-04-05 PROCEDURE — 25510000001 IOPAMIDOL 61 % SOLUTION: Performed by: NURSE PRACTITIONER

## 2024-04-05 PROCEDURE — 70491 CT SOFT TISSUE NECK W/DYE: CPT

## 2024-04-05 PROCEDURE — 71260 CT THORAX DX C+: CPT

## 2024-04-05 PROCEDURE — 74177 CT ABD & PELVIS W/CONTRAST: CPT

## 2024-04-05 RX ADMIN — IOPAMIDOL 50 ML: 612 INJECTION, SOLUTION INTRAVENOUS at 10:23

## 2024-04-09 ENCOUNTER — LAB (OUTPATIENT)
Dept: LAB | Facility: HOSPITAL | Age: 49
End: 2024-04-09
Payer: COMMERCIAL

## 2024-04-09 ENCOUNTER — OFFICE VISIT (OUTPATIENT)
Dept: ONCOLOGY | Facility: CLINIC | Age: 49
End: 2024-04-09
Payer: COMMERCIAL

## 2024-04-09 VITALS
RESPIRATION RATE: 16 BRPM | SYSTOLIC BLOOD PRESSURE: 134 MMHG | TEMPERATURE: 98.6 F | BODY MASS INDEX: 30.03 KG/M2 | HEIGHT: 74 IN | DIASTOLIC BLOOD PRESSURE: 65 MMHG | HEART RATE: 65 BPM | OXYGEN SATURATION: 98 % | WEIGHT: 234 LBS

## 2024-04-09 DIAGNOSIS — C81.12 NODULAR SCLEROSIS HODGKIN LYMPHOMA OF INTRATHORACIC LYMPH NODES: ICD-10-CM

## 2024-04-09 DIAGNOSIS — C81.12 NODULAR SCLEROSIS HODGKIN LYMPHOMA OF INTRATHORACIC LYMPH NODES: Primary | ICD-10-CM

## 2024-04-09 LAB
ALBUMIN SERPL-MCNC: 4.6 G/DL (ref 3.5–5.2)
ALBUMIN/GLOB SERPL: 1.7 G/DL
ALP SERPL-CCNC: 81 U/L (ref 39–117)
ALT SERPL W P-5'-P-CCNC: 24 U/L (ref 1–41)
ANION GAP SERPL CALCULATED.3IONS-SCNC: 10.8 MMOL/L (ref 5–15)
AST SERPL-CCNC: 30 U/L (ref 1–40)
BASOPHILS # BLD AUTO: 0.03 10*3/MM3 (ref 0–0.2)
BASOPHILS NFR BLD AUTO: 0.5 % (ref 0–1.5)
BILIRUB SERPL-MCNC: 0.4 MG/DL (ref 0–1.2)
BUN SERPL-MCNC: 20 MG/DL (ref 6–20)
BUN/CREAT SERPL: 19 (ref 7–25)
CALCIUM SPEC-SCNC: 9 MG/DL (ref 8.6–10.5)
CHLORIDE SERPL-SCNC: 103 MMOL/L (ref 98–107)
CO2 SERPL-SCNC: 25.2 MMOL/L (ref 22–29)
CREAT SERPL-MCNC: 1.05 MG/DL (ref 0.76–1.27)
DEPRECATED RDW RBC AUTO: 44.4 FL (ref 37–54)
EGFRCR SERPLBLD CKD-EPI 2021: 88.7 ML/MIN/1.73
EOSINOPHIL # BLD AUTO: 0.19 10*3/MM3 (ref 0–0.4)
EOSINOPHIL NFR BLD AUTO: 2.9 % (ref 0.3–6.2)
ERYTHROCYTE [DISTWIDTH] IN BLOOD BY AUTOMATED COUNT: 13.5 % (ref 12.3–15.4)
ERYTHROCYTE [SEDIMENTATION RATE] IN BLOOD: 3 MM/HR (ref 0–15)
GLOBULIN UR ELPH-MCNC: 2.7 GM/DL
GLUCOSE SERPL-MCNC: 93 MG/DL (ref 65–99)
HCT VFR BLD AUTO: 44.4 % (ref 37.5–51)
HGB BLD-MCNC: 14.3 G/DL (ref 13–17.7)
IMM GRANULOCYTES # BLD AUTO: 0.02 10*3/MM3 (ref 0–0.05)
IMM GRANULOCYTES NFR BLD AUTO: 0.3 % (ref 0–0.5)
LYMPHOCYTES # BLD AUTO: 1.29 10*3/MM3 (ref 0.7–3.1)
LYMPHOCYTES NFR BLD AUTO: 19.8 % (ref 19.6–45.3)
MCH RBC QN AUTO: 28.9 PG (ref 26.6–33)
MCHC RBC AUTO-ENTMCNC: 32.2 G/DL (ref 31.5–35.7)
MCV RBC AUTO: 89.9 FL (ref 79–97)
MONOCYTES # BLD AUTO: 0.55 10*3/MM3 (ref 0.1–0.9)
MONOCYTES NFR BLD AUTO: 8.5 % (ref 5–12)
NEUTROPHILS NFR BLD AUTO: 4.42 10*3/MM3 (ref 1.7–7)
NEUTROPHILS NFR BLD AUTO: 68 % (ref 42.7–76)
NRBC BLD AUTO-RTO: 0 /100 WBC (ref 0–0.2)
PLATELET # BLD AUTO: 180 10*3/MM3 (ref 140–450)
PMV BLD AUTO: 11.8 FL (ref 6–12)
POTASSIUM SERPL-SCNC: 5 MMOL/L (ref 3.5–5.2)
PROT SERPL-MCNC: 7.3 G/DL (ref 6–8.5)
RBC # BLD AUTO: 4.94 10*6/MM3 (ref 4.14–5.8)
SODIUM SERPL-SCNC: 139 MMOL/L (ref 136–145)
WBC NRBC COR # BLD AUTO: 6.5 10*3/MM3 (ref 3.4–10.8)

## 2024-04-09 PROCEDURE — 99214 OFFICE O/P EST MOD 30 MIN: CPT | Performed by: INTERNAL MEDICINE

## 2024-04-09 PROCEDURE — 36415 COLL VENOUS BLD VENIPUNCTURE: CPT

## 2024-04-09 PROCEDURE — 85652 RBC SED RATE AUTOMATED: CPT

## 2024-04-09 PROCEDURE — 85025 COMPLETE CBC W/AUTO DIFF WBC: CPT

## 2024-04-09 PROCEDURE — 80053 COMPREHEN METABOLIC PANEL: CPT

## 2024-04-09 NOTE — PROGRESS NOTES
"      PROBLEM LIST:  1.  Hodgkin lymphoma, stage IIB  A) bone marrow biopsy done for chronic anemia 8/12/2022 showed a normocellular bone marrow with maturing trilineage hematopoiesis, increased stainable marrow iron, no evidence of dysplasia, fibrosis, or increased blasts.  Labs showed no evidence of nutrient deficiency, or heavy metal toxicity.  B) CT chest abdomen pelvis on 9/7/2022 showed extensive intrathoracic and supraclavicular lymphadenopathy, largest superior mediastinal lymph node 3.8 cm, left supraclavicular lymph node 2.8 cm, right supraclavicular lymph node 2.7 cm.  Hypodense lesions in the liver most likely benign cysts.  C) supraclavicular lymph node biopsy on 9/30/2022 showed nodular sclerosing Hodgkin lymphoma.  PET/CT on 10/10/2022 showed enlarged and hypermetabolic mediastinal lymphadenopathy, contiguous to lower cervical regions.  No other sites of disease.  D) treatment with ABVD started 10/18/2022.  PET after 2 cycles showed resolution of hypermetasolic activity.  Completed an additional 4 cycles of AVD March 2023.  2. Weight loss    Subjective     CHIEF COMPLAINT: anemia    HISTORY OF PRESENT ILLNESS:   William Maria returns for follow-up.  He is feeling well.  He has gotten his 5K time down further.  Energy level is good.        Objective      /65   Pulse 65   Temp 98.6 °F (37 °C)   Resp 16   Ht 188 cm (74\")   Wt 106 kg (234 lb)   SpO2 98%   BMI 30.04 kg/m²      Performance Status:0              General: well appearing male in no acute distress  Neuro: alert and oriented  HEENT: sclera anicteric, oropharynx clear  Skin: no rashes, lesions, bruising, or petechiae  Psych: mood and affect appropriate          RECENT LABS:    Lab Results   Component Value Date    WBC 5.80 01/09/2024    HGB 14.4 01/09/2024    HCT 43.9 01/09/2024    MCV 90.7 01/09/2024     01/09/2024     Lab Results   Component Value Date    GLUCOSE 95 01/09/2024    BUN 17 01/09/2024    CREATININE 1.08 " 01/09/2024    BCR 15.7 01/09/2024    K 4.7 01/09/2024    CO2 28.4 01/09/2024    CALCIUM 8.9 01/09/2024    PROTENTOTREF 7.1 07/12/2022    ALBUMIN 4.8 01/09/2024    LABIL2 0.8 07/12/2022    AST 38 01/09/2024    ALT 28 01/09/2024         CT Abdomen Pelvis With Contrast  Narrative: PROCEDURE: CT ABDOMEN PELVIS W CONTRAST-     HISTORY:  Follow up stage IIb Hodgkin lymphoma,; C81.12-Nodular  sclerosis Hodgkin lymphoma, intrathoracic lymph nodes     COMPARISON: October 2023.     TECHNIQUE: Multiple axial CT images were obtained from the lung bases  through the pubic symphysis following the administration of Isovue 300  contrast.     FINDINGS:     ABDOMEN: The lung bases are clear. The heart is normal in size. The  liver shows several circumscribed hypodense hepatic lesions measuring up  to 1 cm which are stable and compatible with cysts. The gallbladder is  present. The spleen is unremarkable. No adrenal mass is present.  The  pancreas is normal. The kidneys are normal. The aorta is normal in  caliber. There is no free fluid or adenopathy. The abdominal portions of  the GI tract are unremarkable with no evidence of obstruction.     PELVIS: The appendix is normal.  The urinary bladder is unremarkable.  The prostate is normal in size and contains calcifications. There is no  significant free fluid or adenopathy.     Impression: 1. Stable hepatic cysts.  2. No lymphadenopathy.  3. No evidence of metastatic disease.        CTDI: 8.32 mGy  DLP: 1384.75 mGy.cm        This study was performed with techniques to keep radiation doses as low  as reasonably achievable (ALARA). Individualized dose reduction  techniques using automated exposure control or adjustment of mA and/or  kV according to the patient size were employed.                 Images were reviewed, interpreted, and dictated by Dr. Lara España MD  Transcribed by Radha Moreno PA-C.     This report was signed and finalized on 4/5/2024 11:28 AM by Lara España MD.      CT Chest With Contrast Diagnostic  Narrative: PROCEDURE: CT CHEST W CONTRAST DIAGNOSTIC-     HISTORY: Follow up stage IIb Hodgkin lymphoma,; C81.12-Nodular sclerosis  Hodgkin lymphoma, intrathoracic lymph nodes     COMPARISON: October 6, 2023.     PROCEDURE: The patient was injected with IV contrast.  Axial images were  obtained from the lung apex to the mid abdomen by computed tomography.  This study was performed with techniques to keep radiation doses as low  as reasonably achievable, (ALARA). Individualized dose reduction  techniques using automated exposure control or adjustment of mA and/or  kV according to the patient size were employed.     FINDINGS:     CHEST: There is no suspicious axillary adenopathy. There is evidence of  old calcified granulomatous disease. There is mediastinal and anterior  mediastinal lymph nodes which are stable from the prior exam. There is a  single left perivascular lymph node superiorly that has enlarged from 16  to 19 mm, recurrent disease is a consideration and follow-up is  recommended. The heart is proper size. There is no pericardial or  pleural effusion.  No suspicious infiltrate or nodule identified.   Limited images of the upper abdomen demonstrate hypodense hepatic  lesions which appear stable. No bony destructive lesion seen.     Impression: Slightly enlarged left perivascular lymph node with  remaining mediastinal adenopathy stable from prior; recommend continued  follow-up.           CTDI: 8.32 mGy  DLP:1384.75 mGy.cm     This report was signed and finalized on 4/5/2024 10:55 AM by Lara España MD.     CT Soft Tissue Neck With Contrast  Narrative: PROCEDURE: CT SOFT TISSUE NECK W CONTRAST-     HISTORY: Follow up stage IIb Hodgkin lymphoma,; C81.12-Nodular sclerosis  Hodgkin lymphoma, intrathoracic lymph nodes     COMPARISON: October 6, 2023.     TECHNIQUE: Thin section axial CT images were obtained through the neck  after intravenous contrast administration.  This  study was performed  with techniques to keep radiation doses as low as reasonably achievable,  (ALARA). Individualized dose reduction techniques using automated  exposure control or adjustment of mA and/or kV according to the patient  size were employed.     FINDINGS: There is no suspicious adenopathy or mass lesion present. The  thyroid is unremarkable. Limited images of the lung apices are  unremarkable. The glottis and supraglottic areas are unremarkable. No  acute osseus abnormality is identified. No change identified from prior  exam. Visualized paranasal sinuses and mastoids are clear.     Impression: No acute process.              CTDI: 8.32 mGy  DLP:1384.75 mGy.cm     This report was signed and finalized on 4/5/2024 10:44 AM by Lara España MD.       I personally reviewed the imaging studies.    ASSESSMENT AND PLAN:     William Maria is a 48 y.o. male with stage IIb Hodgkin lymphoma, here for follow-up.    Hodgkin lymphoma: He has completed 6 cycles of chemotherapy with complete response by PET scan.  We will check labs today.  We reviewed his scan results which overall appear stable but there was a slight increase in size of 1 prevascular lymph node.  I will order a PET scan for further evaluation of this.  We will contact him by phone with results.    Otherwise plan to follow-up in 3 months with labs.                            Tamie Mccullough MD  [facility]    4/9/2024          CC:

## 2024-04-15 ENCOUNTER — TELEPHONE (OUTPATIENT)
Dept: ONCOLOGY | Facility: CLINIC | Age: 49
End: 2024-04-15
Payer: COMMERCIAL

## 2024-04-15 NOTE — TELEPHONE ENCOUNTER
Caller: JOSÉ    Relationship: Other    Best call back number: [phone]    What is the best time to reach you: ANYTIME    Who are you requesting to speak with (clinical staff, provider, specific staff member): CLINICAL    What was the call regarding: PLEASE FAX PATIENT'S LAST OFFICE NOTE FROM 4/9 TO JOSÉ [phone].

## 2024-05-09 ENCOUNTER — HOSPITAL ENCOUNTER (OUTPATIENT)
Facility: HOSPITAL | Age: 49
Discharge: HOME | End: 2024-05-09
Payer: COMMERCIAL

## 2024-05-09 ENCOUNTER — TELEPHONE (OUTPATIENT)
Dept: ONCOLOGY | Facility: CLINIC | Age: 49
End: 2024-05-09
Payer: COMMERCIAL

## 2024-05-09 ENCOUNTER — APPOINTMENT (OUTPATIENT)
Facility: HOSPITAL | Age: 49
End: 2024-05-09
Payer: COMMERCIAL

## 2024-05-09 DIAGNOSIS — C81.12 NODULAR SCLEROSIS HODGKIN LYMPHOMA OF INTRATHORACIC LYMPH NODES: ICD-10-CM

## 2024-05-09 LAB — GLUCOSE BLDC GLUCOMTR-MCNC: 97 MG/DL (ref 70–130)

## 2024-05-09 PROCEDURE — A9552 F18 FDG: HCPCS | Performed by: INTERNAL MEDICINE

## 2024-05-09 PROCEDURE — 0 FLUDEOXYGLUCOSE F18 SOLUTION: Performed by: INTERNAL MEDICINE

## 2024-05-09 PROCEDURE — 78815 PET IMAGE W/CT SKULL-THIGH: CPT

## 2024-05-09 PROCEDURE — 82948 REAGENT STRIP/BLOOD GLUCOSE: CPT

## 2024-05-09 RX ADMIN — FLUDEOXYGLUCOSE F18 1 DOSE: 300 INJECTION INTRAVENOUS at 09:29

## 2024-05-29 ENCOUNTER — TELEPHONE (OUTPATIENT)
Dept: ONCOLOGY | Facility: CLINIC | Age: 49
End: 2024-05-29
Payer: COMMERCIAL

## 2024-05-29 NOTE — TELEPHONE ENCOUNTER
Caller: JOSÉ    Relationship: Other    Best call back number: [phone]     What is the best time to reach you: ANYTIME    Who are you requesting to speak with (clinical staff, provider,  specific staff member): CLINICAL    What was the call regarding: PLEASE FAX 5/9 PET SCAN RESULTS TO JOSÉ AT FAX # [phone].

## 2024-07-09 ENCOUNTER — LAB (OUTPATIENT)
Dept: LAB | Facility: HOSPITAL | Age: 49
End: 2024-07-09
Payer: COMMERCIAL

## 2024-07-09 ENCOUNTER — OFFICE VISIT (OUTPATIENT)
Dept: ONCOLOGY | Facility: CLINIC | Age: 49
End: 2024-07-09
Payer: COMMERCIAL

## 2024-07-09 VITALS
OXYGEN SATURATION: 99 % | TEMPERATURE: 98.4 F | HEART RATE: 61 BPM | HEIGHT: 74 IN | SYSTOLIC BLOOD PRESSURE: 134 MMHG | RESPIRATION RATE: 16 BRPM | WEIGHT: 230.1 LBS | DIASTOLIC BLOOD PRESSURE: 83 MMHG | BODY MASS INDEX: 29.53 KG/M2

## 2024-07-09 DIAGNOSIS — C81.12 NODULAR SCLEROSIS HODGKIN LYMPHOMA OF INTRATHORACIC LYMPH NODES: Primary | ICD-10-CM

## 2024-07-09 DIAGNOSIS — C81.12 NODULAR SCLEROSIS HODGKIN LYMPHOMA OF INTRATHORACIC LYMPH NODES: ICD-10-CM

## 2024-07-09 LAB
ALBUMIN SERPL-MCNC: 4.8 G/DL (ref 3.5–5.2)
ALBUMIN/GLOB SERPL: 1.7 G/DL
ALP SERPL-CCNC: 89 U/L (ref 39–117)
ALT SERPL W P-5'-P-CCNC: 24 U/L (ref 1–41)
ANION GAP SERPL CALCULATED.3IONS-SCNC: 10.8 MMOL/L (ref 5–15)
AST SERPL-CCNC: 27 U/L (ref 1–40)
BASOPHILS # BLD AUTO: 0.03 10*3/MM3 (ref 0–0.2)
BASOPHILS NFR BLD AUTO: 0.5 % (ref 0–1.5)
BILIRUB SERPL-MCNC: 0.6 MG/DL (ref 0–1.2)
BUN SERPL-MCNC: 16 MG/DL (ref 6–20)
BUN/CREAT SERPL: 16.3 (ref 7–25)
CALCIUM SPEC-SCNC: 9.4 MG/DL (ref 8.6–10.5)
CHLORIDE SERPL-SCNC: 101 MMOL/L (ref 98–107)
CO2 SERPL-SCNC: 27.2 MMOL/L (ref 22–29)
CREAT SERPL-MCNC: 0.98 MG/DL (ref 0.76–1.27)
DEPRECATED RDW RBC AUTO: 43.8 FL (ref 37–54)
EGFRCR SERPLBLD CKD-EPI 2021: 96.3 ML/MIN/1.73
EOSINOPHIL # BLD AUTO: 0.14 10*3/MM3 (ref 0–0.4)
EOSINOPHIL NFR BLD AUTO: 2.3 % (ref 0.3–6.2)
ERYTHROCYTE [DISTWIDTH] IN BLOOD BY AUTOMATED COUNT: 13.2 % (ref 12.3–15.4)
GLOBULIN UR ELPH-MCNC: 2.8 GM/DL
GLUCOSE SERPL-MCNC: 95 MG/DL (ref 65–99)
HCT VFR BLD AUTO: 46.5 % (ref 37.5–51)
HGB BLD-MCNC: 14.9 G/DL (ref 13–17.7)
IMM GRANULOCYTES # BLD AUTO: 0.05 10*3/MM3 (ref 0–0.05)
IMM GRANULOCYTES NFR BLD AUTO: 0.8 % (ref 0–0.5)
LDH SERPL-CCNC: 190 U/L (ref 135–225)
LYMPHOCYTES # BLD AUTO: 1.04 10*3/MM3 (ref 0.7–3.1)
LYMPHOCYTES NFR BLD AUTO: 17.1 % (ref 19.6–45.3)
MCH RBC QN AUTO: 28.9 PG (ref 26.6–33)
MCHC RBC AUTO-ENTMCNC: 32 G/DL (ref 31.5–35.7)
MCV RBC AUTO: 90.3 FL (ref 79–97)
MONOCYTES # BLD AUTO: 0.54 10*3/MM3 (ref 0.1–0.9)
MONOCYTES NFR BLD AUTO: 8.9 % (ref 5–12)
NEUTROPHILS NFR BLD AUTO: 4.27 10*3/MM3 (ref 1.7–7)
NEUTROPHILS NFR BLD AUTO: 70.4 % (ref 42.7–76)
NRBC BLD AUTO-RTO: 0 /100 WBC (ref 0–0.2)
PLATELET # BLD AUTO: 173 10*3/MM3 (ref 140–450)
PMV BLD AUTO: 10.7 FL (ref 6–12)
POTASSIUM SERPL-SCNC: 4.5 MMOL/L (ref 3.5–5.2)
PROT SERPL-MCNC: 7.6 G/DL (ref 6–8.5)
RBC # BLD AUTO: 5.15 10*6/MM3 (ref 4.14–5.8)
SODIUM SERPL-SCNC: 139 MMOL/L (ref 136–145)
WBC NRBC COR # BLD AUTO: 6.07 10*3/MM3 (ref 3.4–10.8)

## 2024-07-09 PROCEDURE — 85025 COMPLETE CBC W/AUTO DIFF WBC: CPT | Performed by: NURSE PRACTITIONER

## 2024-07-09 PROCEDURE — 83615 LACTATE (LD) (LDH) ENZYME: CPT | Performed by: NURSE PRACTITIONER

## 2024-07-09 PROCEDURE — 99214 OFFICE O/P EST MOD 30 MIN: CPT | Performed by: NURSE PRACTITIONER

## 2024-07-09 PROCEDURE — 80053 COMPREHEN METABOLIC PANEL: CPT | Performed by: NURSE PRACTITIONER

## 2024-07-09 PROCEDURE — 36415 COLL VENOUS BLD VENIPUNCTURE: CPT | Performed by: NURSE PRACTITIONER

## 2024-07-09 NOTE — PROGRESS NOTES
"      PROBLEM LIST:  1.  Hodgkin lymphoma, stage IIB  A) bone marrow biopsy done for chronic anemia 8/12/2022 showed a normocellular bone marrow with maturing trilineage hematopoiesis, increased stainable marrow iron, no evidence of dysplasia, fibrosis, or increased blasts.  Labs showed no evidence of nutrient deficiency, or heavy metal toxicity.  B) CT chest abdomen pelvis on 9/7/2022 showed extensive intrathoracic and supraclavicular lymphadenopathy, largest superior mediastinal lymph node 3.8 cm, left supraclavicular lymph node 2.8 cm, right supraclavicular lymph node 2.7 cm.  Hypodense lesions in the liver most likely benign cysts.  C) supraclavicular lymph node biopsy on 9/30/2022 showed nodular sclerosing Hodgkin lymphoma.  PET/CT on 10/10/2022 showed enlarged and hypermetabolic mediastinal lymphadenopathy, contiguous to lower cervical regions.  No other sites of disease.  D) treatment with ABVD started 10/18/2022.  PET after 2 cycles showed resolution of hypermetasolic activity.  Completed an additional 4 cycles of AVD March 2023.  2. Weight loss    Subjective     CHIEF COMPLAINT: anemia    HISTORY OF PRESENT ILLNESS:   William Maria returns for follow-up.  Overall, doing well.  Energy level remains good.  He is planning another 5K on Saturday in [place].  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  He is focusing on his overall health.  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  Denies any fever, chills, night sweats no complaints today.        Objective      /83   Pulse 61   Temp 98.4 °F (36.9 °C)   Resp 16   Ht 188 cm (74.02\")   Wt 104 kg (230 lb 1.6 oz)   SpO2 99%   BMI 29.53 kg/m²      Performance Status:0  ECOG score: 0         General: well appearing male in no acute distress  Neuro: alert and oriented  HEENT: sclera anicteric  Lymphatics: no cervical, supraclavicular, or axillary adenopathy  Cardiovascular: regular rate and rhythm, no murmurs  Lungs: " clear to auscultation bilaterally  Extremeties: no lower extremity edema  Skin: no rashes, lesions, bruising, or petechiae  Psych: mood and affect appropriate            RECENT LABS:    Lab Results   Component Value Date    WBC 6.50 04/09/2024    HGB 14.3 04/09/2024    HCT 44.4 04/09/2024    MCV 89.9 04/09/2024     04/09/2024     Lab Results   Component Value Date    GLUCOSE 93 04/09/2024    BUN 20 04/09/2024    CREATININE 1.05 04/09/2024    BCR 19.0 04/09/2024    K 5.0 04/09/2024    CO2 25.2 04/09/2024    CALCIUM 9.0 04/09/2024    PROTENTOTREF 7.1 07/12/2022    ALBUMIN 4.6 04/09/2024    LABIL2 0.8 07/12/2022    AST 30 04/09/2024    ALT 24 04/09/2024         NM PET/CT Skull Base to Mid Thigh  Narrative: NM PET/CT SKULL BASE TO MID THIGH    Date of Exam: 5/9/2024 9:15 AM EDT    Indication: hodgkin lymphoma.    Comparison: 4/5/2023 whole-body scan    Technique: 10.75 mCi of F-18 FDG was administered intravenously. PET imaging was obtained from skull base to mid-thigh approximately 60 minutes after radiotracer injection. A low dose non contrast CT was obtained for attenuation correction and anatomic   localization. Fused PET-CT and 3D MIP reconstructions were utilized for image interpretation.  Fasting blood glucose level: 90 mg/dl.    Reference uptake values:  Mediastinum: 3.9 SUVmax  Liver: 5.4 SUVmax  Normalization method: Body Weight    Findings:  Prior study report from 4/5/2023 noted persistent enlarged mediastinal lymph nodes, but stable and non-hypermetabolic. Overall negative PET scan. Recent outside CT scan reports, negative for significant disease except approximately 3 mm enlargement of a   prevascular lymph node in the chest. Stable nodes elsewhere.    Today's 3D images show normal radiotracer distribution.    Multiplanar images show no significant asymmetry of uptake in the brain. No hypermetabolic node or mass is seen in the neck. No hypermetabolic mediastinal, hilar, axillary, or pulmonary  parenchymal disease is seen.    Below the diaphragm, no hypermetabolic solid organ disease, adenopathy or peritoneal disease is seen. There is expected GI and  tract uptake.    CT images show generally stable appearance of the patient's residual superior mediastinal lymphadenopathy.  Impression: Impression:    1. Negative PET scan. No evidence of recurrent disease.    2. Residual superior mediastinal lymphadenopathy, not obviously changed on CT imaging from 4/5/2023 exam.    Electronically Signed: Manny Beckham MD    5/9/2024 11:32 AM EDT    Workstation ID: [id number]    I personally reviewed the imaging studies.    ASSESSMENT AND PLAN:     William Maria is a 48 y.o. male with stage IIb Hodgkin lymphoma, here for follow-up.    Hodgkin lymphoma: He has completed 6 cycles of chemotherapy with complete response by PET scan.  We reviewed PET scan in May showed stable residual superior mediastinal lymphadenopathy with no obvious changes.  We will plan to repeat CT scan in October along with labs.  We will check CBC CMP and LDH today and I will call him with results.      Follow-up in 3 months with labs and scans          Total time of patient care including time prior to, face to face with patient, and following visit spent in reviewing records, lab results, imaging studies, discussion with patient, and documentation/charting was > 32 minutes                    Ashanti Spicer APRN  [facility]    7/9/2024          CC:

## 2024-10-10 ENCOUNTER — HOSPITAL ENCOUNTER (OUTPATIENT)
Dept: CT IMAGING | Facility: HOSPITAL | Age: 49
Discharge: HOME | End: 2024-10-10
Payer: COMMERCIAL

## 2024-10-10 DIAGNOSIS — C81.12 NODULAR SCLEROSIS HODGKIN LYMPHOMA OF INTRATHORACIC LYMPH NODES: ICD-10-CM

## 2024-10-10 PROCEDURE — 71260 CT THORAX DX C+: CPT

## 2024-10-10 PROCEDURE — 74177 CT ABD & PELVIS W/CONTRAST: CPT

## 2024-10-10 PROCEDURE — 25510000001 IOPAMIDOL 61 % SOLUTION: Performed by: NURSE PRACTITIONER

## 2024-10-10 RX ORDER — IOPAMIDOL 612 MG/ML
100 INJECTION, SOLUTION INTRAVASCULAR
Status: COMPLETED | OUTPATIENT
Start: 2024-10-10 | End: 2024-10-10

## 2024-10-10 RX ADMIN — IOPAMIDOL 100 ML: 612 INJECTION, SOLUTION INTRAVENOUS at 08:19

## 2024-10-15 ENCOUNTER — OFFICE VISIT (OUTPATIENT)
Dept: ONCOLOGY | Facility: CLINIC | Age: 49
End: 2024-10-15
Payer: COMMERCIAL

## 2024-10-15 ENCOUNTER — LAB (OUTPATIENT)
Dept: LAB | Facility: HOSPITAL | Age: 49
End: 2024-10-15
Payer: COMMERCIAL

## 2024-10-15 VITALS
DIASTOLIC BLOOD PRESSURE: 82 MMHG | WEIGHT: 236.1 LBS | HEART RATE: 57 BPM | HEIGHT: 74 IN | SYSTOLIC BLOOD PRESSURE: 143 MMHG | TEMPERATURE: 98.9 F | OXYGEN SATURATION: 98 % | BODY MASS INDEX: 30.3 KG/M2 | RESPIRATION RATE: 16 BRPM

## 2024-10-15 DIAGNOSIS — C81.12 NODULAR SCLEROSIS HODGKIN LYMPHOMA OF INTRATHORACIC LYMPH NODES: Primary | ICD-10-CM

## 2024-10-15 DIAGNOSIS — C81.12 NODULAR SCLEROSIS HODGKIN LYMPHOMA OF INTRATHORACIC LYMPH NODES: ICD-10-CM

## 2024-10-15 LAB
ALBUMIN SERPL-MCNC: 4.5 G/DL (ref 3.5–5.2)
ALBUMIN/GLOB SERPL: 1.7 G/DL
ALP SERPL-CCNC: 79 U/L (ref 39–117)
ALT SERPL W P-5'-P-CCNC: 26 U/L (ref 1–41)
ANION GAP SERPL CALCULATED.3IONS-SCNC: 11.5 MMOL/L (ref 5–15)
AST SERPL-CCNC: 29 U/L (ref 1–40)
BASOPHILS # BLD AUTO: 0.04 10*3/MM3 (ref 0–0.2)
BASOPHILS NFR BLD AUTO: 0.6 % (ref 0–1.5)
BILIRUB SERPL-MCNC: 0.3 MG/DL (ref 0–1.2)
BUN SERPL-MCNC: 24 MG/DL (ref 6–20)
BUN/CREAT SERPL: 22.9 (ref 7–25)
CALCIUM SPEC-SCNC: 9 MG/DL (ref 8.6–10.5)
CHLORIDE SERPL-SCNC: 102 MMOL/L (ref 98–107)
CO2 SERPL-SCNC: 26.5 MMOL/L (ref 22–29)
CREAT SERPL-MCNC: 1.05 MG/DL (ref 0.76–1.27)
DEPRECATED RDW RBC AUTO: 43.3 FL (ref 37–54)
EGFRCR SERPLBLD CKD-EPI 2021: 88.7 ML/MIN/1.73
EOSINOPHIL # BLD AUTO: 0.29 10*3/MM3 (ref 0–0.4)
EOSINOPHIL NFR BLD AUTO: 4.4 % (ref 0.3–6.2)
ERYTHROCYTE [DISTWIDTH] IN BLOOD BY AUTOMATED COUNT: 13.2 % (ref 12.3–15.4)
ERYTHROCYTE [SEDIMENTATION RATE] IN BLOOD: 1 MM/HR (ref 0–15)
GLOBULIN UR ELPH-MCNC: 2.7 GM/DL
GLUCOSE SERPL-MCNC: 100 MG/DL (ref 65–99)
HCT VFR BLD AUTO: 44.2 % (ref 37.5–51)
HGB BLD-MCNC: 14.5 G/DL (ref 13–17.7)
IMM GRANULOCYTES # BLD AUTO: 0.04 10*3/MM3 (ref 0–0.05)
IMM GRANULOCYTES NFR BLD AUTO: 0.6 % (ref 0–0.5)
LYMPHOCYTES # BLD AUTO: 1.41 10*3/MM3 (ref 0.7–3.1)
LYMPHOCYTES NFR BLD AUTO: 21.2 % (ref 19.6–45.3)
MCH RBC QN AUTO: 29.4 PG (ref 26.6–33)
MCHC RBC AUTO-ENTMCNC: 32.8 G/DL (ref 31.5–35.7)
MCV RBC AUTO: 89.5 FL (ref 79–97)
MONOCYTES # BLD AUTO: 0.59 10*3/MM3 (ref 0.1–0.9)
MONOCYTES NFR BLD AUTO: 8.9 % (ref 5–12)
NEUTROPHILS NFR BLD AUTO: 4.28 10*3/MM3 (ref 1.7–7)
NEUTROPHILS NFR BLD AUTO: 64.3 % (ref 42.7–76)
NRBC BLD AUTO-RTO: 0 /100 WBC (ref 0–0.2)
PLATELET # BLD AUTO: 183 10*3/MM3 (ref 140–450)
PMV BLD AUTO: 11.2 FL (ref 6–12)
POTASSIUM SERPL-SCNC: 4.5 MMOL/L (ref 3.5–5.2)
PROT SERPL-MCNC: 7.2 G/DL (ref 6–8.5)
RBC # BLD AUTO: 4.94 10*6/MM3 (ref 4.14–5.8)
SODIUM SERPL-SCNC: 140 MMOL/L (ref 136–145)
WBC NRBC COR # BLD AUTO: 6.65 10*3/MM3 (ref 3.4–10.8)

## 2024-10-15 PROCEDURE — 85025 COMPLETE CBC W/AUTO DIFF WBC: CPT

## 2024-10-15 PROCEDURE — 85652 RBC SED RATE AUTOMATED: CPT | Performed by: INTERNAL MEDICINE

## 2024-10-15 PROCEDURE — 36415 COLL VENOUS BLD VENIPUNCTURE: CPT

## 2024-10-15 PROCEDURE — 80053 COMPREHEN METABOLIC PANEL: CPT

## 2024-10-15 PROCEDURE — 99214 OFFICE O/P EST MOD 30 MIN: CPT | Performed by: INTERNAL MEDICINE

## 2024-10-15 NOTE — PROGRESS NOTES
"      PROBLEM LIST:  1.  Hodgkin lymphoma, stage IIB  A) bone marrow biopsy done for chronic anemia 8/12/2022 showed a normocellular bone marrow with maturing trilineage hematopoiesis, increased stainable marrow iron, no evidence of dysplasia, fibrosis, or increased blasts.  Labs showed no evidence of nutrient deficiency, or heavy metal toxicity.  B) CT chest abdomen pelvis on 9/7/2022 showed extensive intrathoracic and supraclavicular lymphadenopathy, largest superior mediastinal lymph node 3.8 cm, left supraclavicular lymph node 2.8 cm, right supraclavicular lymph node 2.7 cm.  Hypodense lesions in the liver most likely benign cysts.  C) supraclavicular lymph node biopsy on 9/30/2022 showed nodular sclerosing Hodgkin lymphoma.  PET/CT on 10/10/2022 showed enlarged and hypermetabolic mediastinal lymphadenopathy, contiguous to lower cervical regions.  No other sites of disease.  D) treatment with ABVD started 10/18/2022.  PET after 2 cycles showed resolution of hypermetasolic activity.  Completed an additional 4 cycles of AVD March 2023.  2. Weight loss    Subjective     CHIEF COMPLAINT: anemia    HISTORY OF PRESENT ILLNESS:   William Maria returns for follow-up.  He is feeling well.  He is busy with life and work.        Objective      /82   Pulse 57   Temp 98.9 °F (37.2 °C)   Resp 16   Ht 188 cm (74.02\")   Wt 107 kg (236 lb 1.6 oz)   SpO2 98%   BMI 30.30 kg/m²      Performance Status:  ECOG score: 0         General: well appearing male in no acute distress  Neuro: alert and oriented  HEENT: sclera anicteric  Lymphatics: no cervical, supraclavicular, or axillary adenopathy  Cardiovascular: regular rate and rhythm, no murmurs  Lungs: clear to auscultation bilaterally  Extremeties: no lower extremity edema  Skin: no rashes, lesions, bruising, or petechiae  Psych: mood and affect appropriate            RECENT LABS:    Lab Results   Component Value Date    WBC 6.07 07/09/2024    HGB 14.9 07/09/2024 "    HCT 46.5 07/09/2024    MCV 90.3 07/09/2024     07/09/2024     Lab Results   Component Value Date    GLUCOSE 95 07/09/2024    BUN 16 07/09/2024    CREATININE 0.98 07/09/2024    BCR 16.3 07/09/2024    K 4.5 07/09/2024    CO2 27.2 07/09/2024    CALCIUM 9.4 07/09/2024    PROTENTOTREF 7.1 07/12/2022    ALBUMIN 4.8 07/09/2024    LABIL2 0.8 07/12/2022    AST 27 07/09/2024    ALT 24 07/09/2024         CT Abdomen Pelvis With Contrast  Narrative: PROCEDURE: CT ABDOMEN PELVIS W CONTRAST-     HISTORY:  Follow up lymphoma; C81.12-Nodular sclerosis Hodgkin lymphoma,  intrathoracic lymph nodes     COMPARISON: April 2024.     TECHNIQUE: Multiple axial CT images were obtained from the lung bases  through the pubic symphysis following the administration of Isovue 300  contrast.     FINDINGS:     ABDOMEN: The liver shows numerous predominantly subcentimeter hepatic  cysts, stable. The gallbladder is unremarkable. The spleen is  unremarkable. No adrenal mass is present.  The pancreas is normal. The  kidneys are normal. The aorta is normal in caliber. There is no free  fluid or adenopathy. The abdominal portions of the GI tract are  unremarkable with no evidence of obstruction.     PELVIS: The appendix is normal.  The urinary bladder is unremarkable.  There is no significant free fluid or adenopathy.     Impression: No acute findings or evidence of metastatic disease.        CTDI: 8.58 mGy  DLP: 693.04 mGy.cm        This study was performed with techniques to keep radiation doses as low  as reasonably achievable (ALARA). Individualized dose reduction  techniques using automated exposure control or adjustment of mA and/or  kV according to the patient size were employed.                 Images were reviewed, interpreted, and dictated by Dr. Scott Chau MD  Transcribed by Radha Moreno PA-C.     This report was signed and finalized on 10/10/2024 3:14 PM by Scott Chau MD.     CT Chest With Contrast Diagnostic  Narrative:  PROCEDURE: CT CHEST W CONTRAST DIAGNOSTIC-     HISTORY: Follow up lymphoma; C81.12-Nodular sclerosis Hodgkin lymphoma,  intrathoracic lymph nodes     COMPARISON: April 2024.     PROCEDURE: Multiple axial CT images were obtained from the thoracic  inlet through the upper abdomen following the administration of  intravenous contrast.     FINDINGS:  There are mildly enlarged anterior/prevascular lymph nodes. The most  anterior prevascular lymph node on image #43 measures 18 x 11 mm,  previously 19 x 12 mm. A lymph node lateral to the left subclavian  artery measures 18 mm, unchanged. Other mildly enlarged lymph nodes are  stable. There is no hilar or left axillary lymphadenopathy. The heart is  normal in size. The aorta is normal in caliber. There is no pericardial  or pleural effusion. Lung windows reveal no suspicious infiltrate or  nodules .     Impression: Stable mild anterior mediastinal lymphadenopathy.        This study was performed with techniques to keep radiation doses as low  as reasonably achievable (ALARA). Individualized dose reduction  techniques using automated exposure control or adjustment of mA and/or  kV according to the patient size were employed.           CTDI: 5.34 mGy  DLP:                 Images were reviewed, interpreted, and dictated by Dr. Scott Chau MD  Transcribed by Radha Moreno PA-C.     This report was signed and finalized on 10/10/2024 3:13 PM by Scott Chau MD.       I personally reviewed the imaging studies.    ASSESSMENT AND PLAN:     William Maria is a 48 y.o. male with stage IIb Hodgkin lymphoma, here for follow-up.    Hodgkin lymphoma: He has completed 6 cycles of chemotherapy with complete response by PET scan.  His CT imaging shows stable mild mediastinal adenopathy.      Follow-up in 3 months with labs.  Plan to repeat scans in 6 months which will be 2 years from completion of treatment.          Total time of patient care including time prior to, face to  face with patient, and following visit spent in reviewing records, lab results, imaging studies, discussion with patient, and documentation/charting was > 30 minutes                    Tamie Mccullough MD  [facility] Hematology and Oncology    10/15/2024          CC:

## 2025-01-14 ENCOUNTER — OFFICE VISIT (OUTPATIENT)
Dept: ONCOLOGY | Facility: CLINIC | Age: 50
End: 2025-01-14
Payer: COMMERCIAL

## 2025-01-14 ENCOUNTER — LAB (OUTPATIENT)
Dept: LAB | Facility: HOSPITAL | Age: 50
End: 2025-01-14
Payer: COMMERCIAL

## 2025-01-14 VITALS
HEART RATE: 65 BPM | HEIGHT: 74 IN | RESPIRATION RATE: 16 BRPM | BODY MASS INDEX: 30.38 KG/M2 | TEMPERATURE: 97.8 F | OXYGEN SATURATION: 99 % | SYSTOLIC BLOOD PRESSURE: 145 MMHG | WEIGHT: 236.7 LBS | DIASTOLIC BLOOD PRESSURE: 78 MMHG

## 2025-01-14 DIAGNOSIS — C81.12 NODULAR SCLEROSIS HODGKIN LYMPHOMA OF INTRATHORACIC LYMPH NODES: Primary | ICD-10-CM

## 2025-01-14 LAB
ALBUMIN SERPL-MCNC: 4.8 G/DL (ref 3.5–5.2)
ALBUMIN/GLOB SERPL: 1.8 G/DL
ALP SERPL-CCNC: 76 U/L (ref 39–117)
ALT SERPL W P-5'-P-CCNC: 20 U/L (ref 1–41)
ANION GAP SERPL CALCULATED.3IONS-SCNC: 9 MMOL/L (ref 5–15)
AST SERPL-CCNC: 25 U/L (ref 1–40)
BASOPHILS # BLD AUTO: 0.04 10*3/MM3 (ref 0–0.2)
BASOPHILS NFR BLD AUTO: 0.6 % (ref 0–1.5)
BILIRUB SERPL-MCNC: 0.3 MG/DL (ref 0–1.2)
BUN SERPL-MCNC: 25 MG/DL (ref 6–20)
BUN/CREAT SERPL: 22.1 (ref 7–25)
CALCIUM SPEC-SCNC: 9.6 MG/DL (ref 8.6–10.5)
CHLORIDE SERPL-SCNC: 103 MMOL/L (ref 98–107)
CO2 SERPL-SCNC: 28 MMOL/L (ref 22–29)
CREAT SERPL-MCNC: 1.13 MG/DL (ref 0.76–1.27)
DEPRECATED RDW RBC AUTO: 42.8 FL (ref 37–54)
EGFRCR SERPLBLD CKD-EPI 2021: 80.7 ML/MIN/1.73
EOSINOPHIL # BLD AUTO: 0.15 10*3/MM3 (ref 0–0.4)
EOSINOPHIL NFR BLD AUTO: 2.4 % (ref 0.3–6.2)
ERYTHROCYTE [DISTWIDTH] IN BLOOD BY AUTOMATED COUNT: 13.1 % (ref 12.3–15.4)
GLOBULIN UR ELPH-MCNC: 2.7 GM/DL
GLUCOSE SERPL-MCNC: 96 MG/DL (ref 65–99)
HCT VFR BLD AUTO: 44.9 % (ref 37.5–51)
HGB BLD-MCNC: 14.7 G/DL (ref 13–17.7)
IMM GRANULOCYTES # BLD AUTO: 0.03 10*3/MM3 (ref 0–0.05)
IMM GRANULOCYTES NFR BLD AUTO: 0.5 % (ref 0–0.5)
LDH SERPL-CCNC: 200 U/L (ref 135–225)
LYMPHOCYTES # BLD AUTO: 1.28 10*3/MM3 (ref 0.7–3.1)
LYMPHOCYTES NFR BLD AUTO: 20.4 % (ref 19.6–45.3)
MCH RBC QN AUTO: 29.2 PG (ref 26.6–33)
MCHC RBC AUTO-ENTMCNC: 32.7 G/DL (ref 31.5–35.7)
MCV RBC AUTO: 89.3 FL (ref 79–97)
MONOCYTES # BLD AUTO: 0.54 10*3/MM3 (ref 0.1–0.9)
MONOCYTES NFR BLD AUTO: 8.6 % (ref 5–12)
NEUTROPHILS NFR BLD AUTO: 4.24 10*3/MM3 (ref 1.7–7)
NEUTROPHILS NFR BLD AUTO: 67.5 % (ref 42.7–76)
NRBC BLD AUTO-RTO: 0 /100 WBC (ref 0–0.2)
PLATELET # BLD AUTO: 177 10*3/MM3 (ref 140–450)
PMV BLD AUTO: 11 FL (ref 6–12)
POTASSIUM SERPL-SCNC: 5 MMOL/L (ref 3.5–5.2)
PROT SERPL-MCNC: 7.5 G/DL (ref 6–8.5)
RBC # BLD AUTO: 5.03 10*6/MM3 (ref 4.14–5.8)
SODIUM SERPL-SCNC: 140 MMOL/L (ref 136–145)
WBC NRBC COR # BLD AUTO: 6.28 10*3/MM3 (ref 3.4–10.8)

## 2025-01-14 PROCEDURE — 99214 OFFICE O/P EST MOD 30 MIN: CPT | Performed by: NURSE PRACTITIONER

## 2025-01-14 PROCEDURE — 36415 COLL VENOUS BLD VENIPUNCTURE: CPT | Performed by: NURSE PRACTITIONER

## 2025-01-14 PROCEDURE — 85025 COMPLETE CBC W/AUTO DIFF WBC: CPT | Performed by: NURSE PRACTITIONER

## 2025-01-14 PROCEDURE — 83615 LACTATE (LD) (LDH) ENZYME: CPT | Performed by: NURSE PRACTITIONER

## 2025-01-14 PROCEDURE — 80053 COMPREHEN METABOLIC PANEL: CPT | Performed by: NURSE PRACTITIONER

## 2025-01-14 NOTE — PROGRESS NOTES
"      PROBLEM LIST:  1.  Hodgkin lymphoma, stage IIB  A) bone marrow biopsy done for chronic anemia 8/12/2022 showed a normocellular bone marrow with maturing trilineage hematopoiesis, increased stainable marrow iron, no evidence of dysplasia, fibrosis, or increased blasts.  Labs showed no evidence of nutrient deficiency, or heavy metal toxicity.  B) CT chest abdomen pelvis on 9/7/2022 showed extensive intrathoracic and supraclavicular lymphadenopathy, largest superior mediastinal lymph node 3.8 cm, left supraclavicular lymph node 2.8 cm, right supraclavicular lymph node 2.7 cm.  Hypodense lesions in the liver most likely benign cysts.  C) supraclavicular lymph node biopsy on 9/30/2022 showed nodular sclerosing Hodgkin lymphoma.  PET/CT on 10/10/2022 showed enlarged and hypermetabolic mediastinal lymphadenopathy, contiguous to lower cervical regions.  No other sites of disease.  D) treatment with ABVD started 10/18/2022.  PET after 2 cycles showed resolution of hypermetasolic activity.  Completed an additional 4 cycles of AVD March 2023.  2. Weight loss    Subjective     CHIEF COMPLAINT: anemia    HISTORY OF PRESENT ILLNESS:   William Maria returns for follow-up.  Overall he is feeling well.  Continues to be very busy.  His wife is looking to open up a new business so they are staying active with that.           Objective      /78   Pulse 65   Temp 97.8 °F (36.6 °C)   Resp 16   Ht 188 cm (74.02\")   Wt 107 kg (236 lb 11.2 oz)   SpO2 99%   BMI 30.38 kg/m²      Performance Status:0  ECOG score: 0         General: well appearing male in no acute distress  Neuro: alert and oriented  HEENT: sclera anicteric, oropharynx clear  Lymphatics: no cervical, supraclavicular, or axillary adenopathy  Cardiovascular: regular rate and rhythm, no murmurs  Lungs: clear to auscultation bilaterally  Abdomen: soft, nontender, nondistended.  No palpable organomegaly  Extremeties: no lower extremity edema  Skin: no " rashes, lesions, bruising, or petechiae  Psych: mood and affect appropriate              RECENT LABS:    Lab Results   Component Value Date    WBC 6.65 10/15/2024    HGB 14.5 10/15/2024    HCT 44.2 10/15/2024    MCV 89.5 10/15/2024     10/15/2024     Lab Results   Component Value Date    GLUCOSE 100 (H) 10/15/2024    BUN 24 (H) 10/15/2024    CREATININE 1.05 10/15/2024    BCR 22.9 10/15/2024    K 4.5 10/15/2024    CO2 26.5 10/15/2024    CALCIUM 9.0 10/15/2024    PROTENTOTREF 7.1 07/12/2022    ALBUMIN 4.5 10/15/2024    LABIL2 0.8 07/12/2022    AST 29 10/15/2024    ALT 26 10/15/2024         CT Abdomen Pelvis With Contrast  Narrative: PROCEDURE: CT ABDOMEN PELVIS W CONTRAST-     HISTORY:  Follow up lymphoma; C81.12-Nodular sclerosis Hodgkin lymphoma,  intrathoracic lymph nodes     COMPARISON: April 2024.     TECHNIQUE: Multiple axial CT images were obtained from the lung bases  through the pubic symphysis following the administration of Isovue 300  contrast.     FINDINGS:     ABDOMEN: The liver shows numerous predominantly subcentimeter hepatic  cysts, stable. The gallbladder is unremarkable. The spleen is  unremarkable. No adrenal mass is present.  The pancreas is normal. The  kidneys are normal. The aorta is normal in caliber. There is no free  fluid or adenopathy. The abdominal portions of the GI tract are  unremarkable with no evidence of obstruction.     PELVIS: The appendix is normal.  The urinary bladder is unremarkable.  There is no significant free fluid or adenopathy.     Impression: No acute findings or evidence of metastatic disease.        CTDI: 8.58 mGy  DLP: 693.04 mGy.cm        This study was performed with techniques to keep radiation doses as low  as reasonably achievable (ALARA). Individualized dose reduction  techniques using automated exposure control or adjustment of mA and/or  kV according to the patient size were employed.                 Images were reviewed, interpreted, and dictated by  Dr. Scott Chau MD  Transcribed by Radha Moreno PA-C.     This report was signed and finalized on 10/10/2024 3:14 PM by Scott Chau MD.     CT Chest With Contrast Diagnostic  Narrative: PROCEDURE: CT CHEST W CONTRAST DIAGNOSTIC-     HISTORY: Follow up lymphoma; C81.12-Nodular sclerosis Hodgkin lymphoma,  intrathoracic lymph nodes     COMPARISON: April 2024.     PROCEDURE: Multiple axial CT images were obtained from the thoracic  inlet through the upper abdomen following the administration of  intravenous contrast.     FINDINGS:  There are mildly enlarged anterior/prevascular lymph nodes. The most  anterior prevascular lymph node on image #43 measures 18 x 11 mm,  previously 19 x 12 mm. A lymph node lateral to the left subclavian  artery measures 18 mm, unchanged. Other mildly enlarged lymph nodes are  stable. There is no hilar or left axillary lymphadenopathy. The heart is  normal in size. The aorta is normal in caliber. There is no pericardial  or pleural effusion. Lung windows reveal no suspicious infiltrate or  nodules .     Impression: Stable mild anterior mediastinal lymphadenopathy.        This study was performed with techniques to keep radiation doses as low  as reasonably achievable (ALARA). Individualized dose reduction  techniques using automated exposure control or adjustment of mA and/or  kV according to the patient size were employed.           CTDI: 5.34 mGy  DLP:                 Images were reviewed, interpreted, and dictated by Dr. Scott Chau MD  Transcribed by Radha Moreno PA-C.     This report was signed and finalized on 10/10/2024 3:13 PM by Scott Chau MD.       I personally reviewed the imaging studies.    ASSESSMENT AND PLAN:     William Maria is a 49 y.o. male with stage IIb Hodgkin lymphoma, here for follow-up.    Hodgkin lymphoma: He has completed 6 cycles of chemotherapy and complete response by PET scan.  CT scan in October 2024 showed stable mild mediastinal  adenopathy.  We will plan to repeat in 3 months.  I will order scans prior to return as well as labs.    Follow-up in 3 months with scans and labs.  We will continue with her current plan to scan every 6 months until 2 years from completion of treatment.          Total time of patient care including time prior to, face to face with patient, and following visit spent in reviewing records, lab results, imaging studies, discussion with patient, and documentation/charting was > 30 minutes                      Ashanti Spicer, ASHLYN  [facility] Hematology and Oncology    1/14/2025          CC:

## 2025-04-10 ENCOUNTER — HOSPITAL ENCOUNTER (OUTPATIENT)
Dept: CT IMAGING | Facility: HOSPITAL | Age: 50
Discharge: HOME | End: 2025-04-10
Admitting: NURSE PRACTITIONER
Payer: COMMERCIAL

## 2025-04-10 DIAGNOSIS — C81.12 NODULAR SCLEROSIS HODGKIN LYMPHOMA OF INTRATHORACIC LYMPH NODES: ICD-10-CM

## 2025-04-10 PROCEDURE — 71260 CT THORAX DX C+: CPT

## 2025-04-10 PROCEDURE — 25510000001 IOPAMIDOL 61 % SOLUTION: Performed by: NURSE PRACTITIONER

## 2025-04-10 PROCEDURE — 74177 CT ABD & PELVIS W/CONTRAST: CPT

## 2025-04-10 RX ORDER — IOPAMIDOL 612 MG/ML
100 INJECTION, SOLUTION INTRAVASCULAR
Status: COMPLETED | OUTPATIENT
Start: 2025-04-10 | End: 2025-04-10

## 2025-04-10 RX ADMIN — IOPAMIDOL 100 ML: 612 INJECTION, SOLUTION INTRAVENOUS at 09:34

## 2025-04-11 ENCOUNTER — TELEPHONE (OUTPATIENT)
Dept: ONCOLOGY | Facility: CLINIC | Age: 50
End: 2025-04-11
Payer: COMMERCIAL

## 2025-04-11 NOTE — TELEPHONE ENCOUNTER
I called patient per Dr. Cordell Little MD.  He had reviewed patient's scans and said that they are stable.  Patient has an appt on Tuesday, April 15 with Dr. Mccullough.  I gave patient the information and he verbalized understanding.

## 2025-04-15 ENCOUNTER — OFFICE VISIT (OUTPATIENT)
Dept: ONCOLOGY | Facility: CLINIC | Age: 50
End: 2025-04-15
Payer: COMMERCIAL

## 2025-04-15 ENCOUNTER — LAB (OUTPATIENT)
Dept: LAB | Facility: HOSPITAL | Age: 50
End: 2025-04-15
Payer: COMMERCIAL

## 2025-04-15 VITALS
HEART RATE: 61 BPM | HEIGHT: 74 IN | DIASTOLIC BLOOD PRESSURE: 91 MMHG | TEMPERATURE: 98.4 F | OXYGEN SATURATION: 97 % | WEIGHT: 236.5 LBS | SYSTOLIC BLOOD PRESSURE: 134 MMHG | BODY MASS INDEX: 30.35 KG/M2

## 2025-04-15 DIAGNOSIS — C81.12 NODULAR SCLEROSIS HODGKIN LYMPHOMA OF INTRATHORACIC LYMPH NODES: ICD-10-CM

## 2025-04-15 DIAGNOSIS — C81.12 NODULAR SCLEROSIS HODGKIN LYMPHOMA OF INTRATHORACIC LYMPH NODES: Primary | ICD-10-CM

## 2025-04-15 LAB
ALBUMIN SERPL-MCNC: 4.6 G/DL (ref 3.5–5.2)
ALBUMIN/GLOB SERPL: 1.8 G/DL
ALP SERPL-CCNC: 68 U/L (ref 39–117)
ALT SERPL W P-5'-P-CCNC: 21 U/L (ref 1–41)
ANION GAP SERPL CALCULATED.3IONS-SCNC: 10.4 MMOL/L (ref 5–15)
AST SERPL-CCNC: 24 U/L (ref 1–40)
BASOPHILS # BLD AUTO: 0.03 10*3/MM3 (ref 0–0.2)
BASOPHILS NFR BLD AUTO: 0.5 % (ref 0–1.5)
BILIRUB SERPL-MCNC: 0.3 MG/DL (ref 0–1.2)
BUN SERPL-MCNC: 22 MG/DL (ref 6–20)
BUN/CREAT SERPL: 21.8 (ref 7–25)
CALCIUM SPEC-SCNC: 9.4 MG/DL (ref 8.6–10.5)
CHLORIDE SERPL-SCNC: 102 MMOL/L (ref 98–107)
CO2 SERPL-SCNC: 27.6 MMOL/L (ref 22–29)
CREAT SERPL-MCNC: 1.01 MG/DL (ref 0.76–1.27)
DEPRECATED RDW RBC AUTO: 43.8 FL (ref 37–54)
EGFRCR SERPLBLD CKD-EPI 2021: 92.3 ML/MIN/1.73
EOSINOPHIL # BLD AUTO: 0.18 10*3/MM3 (ref 0–0.4)
EOSINOPHIL NFR BLD AUTO: 3.1 % (ref 0.3–6.2)
ERYTHROCYTE [DISTWIDTH] IN BLOOD BY AUTOMATED COUNT: 13.3 % (ref 12.3–15.4)
GLOBULIN UR ELPH-MCNC: 2.6 GM/DL
GLUCOSE SERPL-MCNC: 97 MG/DL (ref 65–99)
HCT VFR BLD AUTO: 44.4 % (ref 37.5–51)
HGB BLD-MCNC: 14.2 G/DL (ref 13–17.7)
IMM GRANULOCYTES # BLD AUTO: 0.03 10*3/MM3 (ref 0–0.05)
IMM GRANULOCYTES NFR BLD AUTO: 0.5 % (ref 0–0.5)
LDH SERPL-CCNC: 172 U/L (ref 135–225)
LYMPHOCYTES # BLD AUTO: 1.38 10*3/MM3 (ref 0.7–3.1)
LYMPHOCYTES NFR BLD AUTO: 24 % (ref 19.6–45.3)
MCH RBC QN AUTO: 28.7 PG (ref 26.6–33)
MCHC RBC AUTO-ENTMCNC: 32 G/DL (ref 31.5–35.7)
MCV RBC AUTO: 89.7 FL (ref 79–97)
MONOCYTES # BLD AUTO: 0.51 10*3/MM3 (ref 0.1–0.9)
MONOCYTES NFR BLD AUTO: 8.9 % (ref 5–12)
NEUTROPHILS NFR BLD AUTO: 3.63 10*3/MM3 (ref 1.7–7)
NEUTROPHILS NFR BLD AUTO: 63 % (ref 42.7–76)
NRBC BLD AUTO-RTO: 0 /100 WBC (ref 0–0.2)
PLATELET # BLD AUTO: 176 10*3/MM3 (ref 140–450)
PMV BLD AUTO: 11.5 FL (ref 6–12)
POTASSIUM SERPL-SCNC: 4.2 MMOL/L (ref 3.5–5.2)
PROT SERPL-MCNC: 7.2 G/DL (ref 6–8.5)
RBC # BLD AUTO: 4.95 10*6/MM3 (ref 4.14–5.8)
SODIUM SERPL-SCNC: 140 MMOL/L (ref 136–145)
WBC NRBC COR # BLD AUTO: 5.76 10*3/MM3 (ref 3.4–10.8)

## 2025-04-15 PROCEDURE — 85025 COMPLETE CBC W/AUTO DIFF WBC: CPT

## 2025-04-15 PROCEDURE — 83615 LACTATE (LD) (LDH) ENZYME: CPT

## 2025-04-15 PROCEDURE — 36415 COLL VENOUS BLD VENIPUNCTURE: CPT

## 2025-04-15 PROCEDURE — 80053 COMPREHEN METABOLIC PANEL: CPT

## 2025-04-15 PROCEDURE — 99214 OFFICE O/P EST MOD 30 MIN: CPT | Performed by: INTERNAL MEDICINE

## 2025-04-15 NOTE — PROGRESS NOTES
"      PROBLEM LIST:  1.  Hodgkin lymphoma, stage IIB  A) bone marrow biopsy done for chronic anemia 8/12/2022 showed a normocellular bone marrow with maturing trilineage hematopoiesis, increased stainable marrow iron, no evidence of dysplasia, fibrosis, or increased blasts.  Labs showed no evidence of nutrient deficiency, or heavy metal toxicity.  B) CT chest abdomen pelvis on 9/7/2022 showed extensive intrathoracic and supraclavicular lymphadenopathy, largest superior mediastinal lymph node 3.8 cm, left supraclavicular lymph node 2.8 cm, right supraclavicular lymph node 2.7 cm.  Hypodense lesions in the liver most likely benign cysts.  C) supraclavicular lymph node biopsy on 9/30/2022 showed nodular sclerosing Hodgkin lymphoma.  PET/CT on 10/10/2022 showed enlarged and hypermetabolic mediastinal lymphadenopathy, contiguous to lower cervical regions.  No other sites of disease.  D) treatment with ABVD started 10/18/2022.  PET after 2 cycles showed resolution of hypermetasolic activity.  Completed an additional 4 cycles of AVD March 2023.  2. Weight loss    Subjective     CHIEF COMPLAINT: anemia    HISTORY OF PRESENT ILLNESS:   William Maria returns for follow-up.  He is feeling well.  He continues to run regularly and has signed up for a few races.          Objective      /91   Pulse 61   Temp 98.4 °F (36.9 °C) (Temporal)   Ht 188 cm (74.02\")   Wt 107 kg (236 lb 8 oz)   SpO2 97%   BMI 30.35 kg/m²      Performance Status:0            General: well appearing male in no acute distress  Neuro: alert and oriented  HEENT: sclera anicteric, oropharynx clear  Lymphatics: no cervical, supraclavicular, adenopathy  Cardiovascular: regular rate and rhythm, no murmurs  Lungs: clear to auscultation bilaterally  Abdomen: soft, nontender, nondistended.  No palpable organomegaly  Extremeties: no lower extremity edema  Skin: no rashes, lesions, bruising, or petechiae  Psych: mood and affect " appropriate              RECENT LABS:    Lab Results   Component Value Date    WBC 6.28 01/14/2025    HGB 14.7 01/14/2025    HCT 44.9 01/14/2025    MCV 89.3 01/14/2025     01/14/2025     Lab Results   Component Value Date    GLUCOSE 96 01/14/2025    BUN 25 (H) 01/14/2025    CREATININE 1.13 01/14/2025    BCR 22.1 01/14/2025    K 5.0 01/14/2025    CO2 28.0 01/14/2025    CALCIUM 9.6 01/14/2025    ALBUMIN 4.8 01/14/2025    AST 25 01/14/2025    ALT 20 01/14/2025         CT Chest With Contrast Diagnostic, CT Abdomen Pelvis With Contrast  Narrative: PROCEDURE: CT CHEST W CONTRAST DIAGNOSTIC-, CT ABDOMEN PELVIS W  CONTRAST-     HISTORY: Follow up lymphoma; C81.12-Nodular sclerosis Hodgkin lymphoma,  intrathoracic lymph nodes     COMPARISON: October 10, 2024.     .     PROCEDURE: The patient was injected with IV contrast. Axial images were  obtained from the lung apex to the pubic symphysis by computed  tomography. This study was performed with techniques to keep radiation  doses as low as reasonably achievable, (ALARA). Individualized dose  reduction techniques using automated exposure control or adjustment of  mA and/or kV according to the patient size were employed.     FINDINGS:     CHEST: There is no suspicious axillary adenopathy. There is mildly  enlarged lymph nodes predominantly in the anterior mediastinum and  prevascular space; these are stable from prior exam. There is evidence  of old calcified granulomatous disease. The heart size is normal. There  is no pericardial or pleural effusion. No suspicious infiltrate or  nodule identified. There is a stable subcentimeter pleural-based nodule  medial left apex compared to prior exam. Another pleural-based nodule is  noted anteriorly in the right middle lobe, stable. No bony destructive  lesion seen.     ABDOMEN: The liver again demonstrates several hypodense lesions  scattered throughout the liver most consistent with cysts; no  significant change identified  from prior exam. Gallbladder present with  no CT visible stones. The spleen is again noted to be upper limits of  normal in size at 12.6 cm, stable. No focal mass identified. No adrenal  mass is present.  The pancreas is unremarkable. The kidneys are  unremarkable, without evidence of mass or hydronephrosis. The aorta is  normal in caliber. There is no free fluid or adenopathy. There are a few  mesenteric lymph nodes that are stable from prior.     PELVIS: The GI tract demonstrates no obstruction. The appendix is  identified and appears normal. The urinary bladder is mostly collapsed  with no abnormality seen. Prostate is normal in size. There is no free  fluid, adenopathy, or inflammatory process.     Impression: Stable, mild mediastinal adenopathy compared to prior.     Stable borderline splenomegaly.     Stable hepatic cyst.        CTDI: 8.61 mGy  DLP:442.68 mGy.cm     This report was signed and finalized on 4/11/2025 7:33 AM by Lara España MD.       I personally reviewed the imaging studies.    ASSESSMENT AND PLAN:     William Maria is a 49 y.o. male with stage IIb Hodgkin lymphoma, here for follow-up.    Hodgkin lymphoma: He has completed 6 cycles of chemotherapy and complete response by PET scan.  He is now 2 years out from completing treatment with no evidence of disease recurrence.  At this point we will stop routine scanning and continue to monitor clinically.    Survivorship monitoring: stress test/echo every 10 years from completion of treatment, q6 months lipids and fasting glucose.    He will follow-up in 6 months with labs.          Total time of patient care including time prior to, face to face with patient, and following visit spent in reviewing records, lab results, imaging studies, discussion with patient, and documentation/charting was > 32 minutes                      Tamie Mccullough MD  [facility] Hematology and Oncology    4/15/2025          CC:

## (undated) DEVICE — SINGLE-USE POLYPECTOMY SNARE: Brand: CAPTIVATOR II

## (undated) DEVICE — FLEXIBLE YANKAUER,MEDIUM TIP, NO VACUUM CONTROL: Brand: ARGYLE

## (undated) DEVICE — NDL HYPO ECLPS SFTY 25G 1 1/2IN

## (undated) DEVICE — SKIN PREP TRAY 4 COMPARTM TRAY: Brand: MEDLINE INDUSTRIES, INC.

## (undated) DEVICE — SLV SCD CALF HEMOFORCE DVT THERP REPROC MD

## (undated) DEVICE — SUCTION CANISTER, 1500CC, RIGID: Brand: DEROYAL

## (undated) DEVICE — STRIP,CLOSURE,WOUND,MEDI-STRIP,1/2X4: Brand: MEDLINE

## (undated) DEVICE — SUT VIC 3/0 SH 27IN J416H

## (undated) DEVICE — SYR LL TP 10ML STRL

## (undated) DEVICE — CLAVICLE STRAP: Brand: DEROYAL

## (undated) DEVICE — CONMED SCOPE SAVER BITE BLOCK, 20X27 MM: Brand: SCOPE SAVER

## (undated) DEVICE — GLV SURG ULTRATOUCH BIOGEL/COAT PF LF SZ6 STRL

## (undated) DEVICE — TBG PENCL TELESCP MEGADYNE SMOKE EVAC 10FT

## (undated) DEVICE — NDL HYPO ECLPS SFTY 18G 1 1/2IN

## (undated) DEVICE — SYR LUERLOK 5CC

## (undated) DEVICE — ADHS LIQ MASTISOL 2/3ML

## (undated) DEVICE — FRCP BX RADJAW4 NDL 2.8 240 STD OG

## (undated) DEVICE — UNDYED BRAIDED (POLYGLACTIN 910), SYNTHETIC ABSORBABLE SUTURE: Brand: COATED VICRYL

## (undated) DEVICE — Device

## (undated) DEVICE — SUT SILK 2/0 SH 30IN K833H

## (undated) DEVICE — SOL NACL 0.9PCT 1000ML

## (undated) DEVICE — SYR LUERLOK 20CC BX/50

## (undated) DEVICE — DRSNG WND GZ PAD BORDERED 4X8IN STRL

## (undated) DEVICE — GLV SURG SENSICARE GREEN W/ALOE PF LF 6 STRL

## (undated) DEVICE — SUT VIC 2/0 SH 27IN

## (undated) DEVICE — ENDOSCOPY PORT CONNECTOR FOR OLYMPUS® SCOPES: Brand: ERBE

## (undated) DEVICE — LUBE JELLY PK/2.75GM STRL BX/144

## (undated) DEVICE — SUT PROLN 2/0 CT2 30IN 8411H

## (undated) DEVICE — SPNG GZ STRL 2S 4X4 12PLY

## (undated) DEVICE — DECANTER BAG 9": Brand: MEDLINE INDUSTRIES, INC.

## (undated) DEVICE — DRSNG SURESITE123 6X8IN

## (undated) DEVICE — QUICK CATCH IN-LINE SUCTION POLYP TRAP IS USED FOR SUCTION RETRIEVAL OF ENDOSCOPICALLY REMOVED POLYPS.

## (undated) DEVICE — HYBRID TUBING/CAP SET FOR OLYMPUS® SCOPES: Brand: ERBE

## (undated) DEVICE — DRSNG WND GZ PAD BORDERED LF 4X5IN STRL

## (undated) DEVICE — CVR PROB ULTRASND GLS STRL

## (undated) DEVICE — RICH NECK PROCEDURE: Brand: MEDLINE INDUSTRIES, INC.

## (undated) DEVICE — VLV SXN AIR/H2O ORCAPOD3 1P/U STRL

## (undated) DEVICE — DRSNG TELFA PAD NONADH STR 1S 3X8IN

## (undated) DEVICE — RICH MAJOR PROCEDURE: Brand: MEDLINE INDUSTRIES, INC.